# Patient Record
Sex: MALE | Race: BLACK OR AFRICAN AMERICAN | Employment: OTHER
[De-identification: names, ages, dates, MRNs, and addresses within clinical notes are randomized per-mention and may not be internally consistent; named-entity substitution may affect disease eponyms.]

---

## 2017-01-11 ENCOUNTER — OFFICE VISIT (OUTPATIENT)
Dept: SURGERY | Facility: CLINIC | Age: 57
End: 2017-01-11

## 2017-01-11 VITALS
HEIGHT: 72 IN | HEART RATE: 91 BPM | TEMPERATURE: 95.5 F | WEIGHT: 204 LBS | DIASTOLIC BLOOD PRESSURE: 75 MMHG | SYSTOLIC BLOOD PRESSURE: 115 MMHG | BODY MASS INDEX: 27.63 KG/M2

## 2017-01-11 DIAGNOSIS — Z98.890 HISTORY OF COLONOSCOPY WITH POLYPECTOMY: Primary | ICD-10-CM

## 2017-01-11 DIAGNOSIS — Z86.010 HISTORY OF COLONOSCOPY WITH POLYPECTOMY: Primary | ICD-10-CM

## 2017-01-11 PROCEDURE — 99202 OFFICE O/P NEW SF 15 MIN: CPT | Performed by: SURGERY

## 2017-01-17 ENCOUNTER — OFFICE VISIT (OUTPATIENT)
Dept: ONCOLOGY | Facility: CLINIC | Age: 57
End: 2017-01-17

## 2017-01-17 ENCOUNTER — TELEPHONE (OUTPATIENT)
Dept: ONCOLOGY | Facility: CLINIC | Age: 57
End: 2017-01-17

## 2017-01-17 VITALS
SYSTOLIC BLOOD PRESSURE: 131 MMHG | DIASTOLIC BLOOD PRESSURE: 86 MMHG | TEMPERATURE: 97.4 F | WEIGHT: 208.2 LBS | BODY MASS INDEX: 28.2 KG/M2 | HEART RATE: 65 BPM | RESPIRATION RATE: 20 BRPM

## 2017-01-17 DIAGNOSIS — C34.91 MALIGNANT NEOPLASM OF RIGHT LUNG, UNSPECIFIED PART OF LUNG (HCC): Primary | ICD-10-CM

## 2017-01-17 PROCEDURE — 99214 OFFICE O/P EST MOD 30 MIN: CPT | Performed by: INTERNAL MEDICINE

## 2017-01-30 RX ORDER — MELATONIN
Qty: 30 TABLET | Refills: 0 | Status: SHIPPED | OUTPATIENT
Start: 2017-01-30 | End: 2017-03-02 | Stop reason: SDUPTHER

## 2017-02-21 ENCOUNTER — HOSPITAL ENCOUNTER (OUTPATIENT)
Dept: PHYSICAL THERAPY | Age: 57
Setting detail: THERAPIES SERIES
Discharge: HOME | End: 2017-02-21
Payer: COMMERCIAL

## 2017-02-21 PROCEDURE — 97110 THERAPEUTIC EXERCISES: CPT

## 2017-02-23 ENCOUNTER — HOSPITAL ENCOUNTER (OUTPATIENT)
Dept: PHYSICAL THERAPY | Age: 57
Setting detail: THERAPIES SERIES
End: 2017-02-23
Payer: COMMERCIAL

## 2017-03-01 ENCOUNTER — HOSPITAL ENCOUNTER (OUTPATIENT)
Dept: PHYSICAL THERAPY | Age: 57
Setting detail: THERAPIES SERIES
Discharge: HOME | End: 2017-03-01
Payer: COMMERCIAL

## 2017-03-01 RX ORDER — GABAPENTIN 300 MG/1
CAPSULE ORAL
Qty: 90 CAPSULE | Refills: 0 | Status: SHIPPED | OUTPATIENT
Start: 2017-03-01 | End: 2017-03-30 | Stop reason: SDUPTHER

## 2017-03-02 RX ORDER — MELATONIN
Qty: 30 TABLET | Refills: 0 | Status: SHIPPED | OUTPATIENT
Start: 2017-03-02 | End: 2017-05-19 | Stop reason: SDUPTHER

## 2017-03-03 ENCOUNTER — HOSPITAL ENCOUNTER (OUTPATIENT)
Dept: PHYSICAL THERAPY | Age: 57
Setting detail: THERAPIES SERIES
Discharge: HOME | End: 2017-03-03
Payer: COMMERCIAL

## 2017-03-03 PROCEDURE — G8980 MOBILITY D/C STATUS: HCPCS

## 2017-03-03 PROCEDURE — G8979 MOBILITY GOAL STATUS: HCPCS

## 2017-03-03 PROCEDURE — 97110 THERAPEUTIC EXERCISES: CPT

## 2017-03-30 RX ORDER — GABAPENTIN 300 MG/1
CAPSULE ORAL
Qty: 90 CAPSULE | Refills: 0 | Status: SHIPPED | OUTPATIENT
Start: 2017-03-30 | End: 2017-05-03 | Stop reason: SDUPTHER

## 2017-03-30 RX ORDER — MELATONIN
Qty: 30 TABLET | Refills: 0 | Status: SHIPPED | OUTPATIENT
Start: 2017-03-30 | End: 2017-05-19 | Stop reason: SDUPTHER

## 2017-05-03 RX ORDER — GABAPENTIN 300 MG/1
CAPSULE ORAL
Qty: 90 CAPSULE | Refills: 0 | Status: SHIPPED | OUTPATIENT
Start: 2017-05-03 | End: 2018-01-17

## 2017-05-04 ENCOUNTER — HOSPITAL ENCOUNTER (OUTPATIENT)
Dept: PAIN MANAGEMENT | Age: 57
Discharge: HOME | End: 2017-05-04
Payer: COMMERCIAL

## 2017-05-04 VITALS
DIASTOLIC BLOOD PRESSURE: 83 MMHG | SYSTOLIC BLOOD PRESSURE: 134 MMHG | HEART RATE: 84 BPM | TEMPERATURE: 96.9 F | RESPIRATION RATE: 16 BRPM | WEIGHT: 217 LBS | BODY MASS INDEX: 29.39 KG/M2 | HEIGHT: 72 IN

## 2017-05-04 DIAGNOSIS — M53.80 OTHER SYMPTOMS REFERABLE TO BACK: Chronic | ICD-10-CM

## 2017-05-04 DIAGNOSIS — M54.41 CHRONIC RIGHT-SIDED LOW BACK PAIN WITH BILATERAL SCIATICA: ICD-10-CM

## 2017-05-04 DIAGNOSIS — G89.29 CHRONIC RIGHT-SIDED LOW BACK PAIN WITH BILATERAL SCIATICA: ICD-10-CM

## 2017-05-04 DIAGNOSIS — M54.42 CHRONIC RIGHT-SIDED LOW BACK PAIN WITH BILATERAL SCIATICA: ICD-10-CM

## 2017-05-04 DIAGNOSIS — M51.26 DISPLACEMENT OF LUMBAR INTERVERTEBRAL DISC WITHOUT MYELOPATHY: Primary | Chronic | ICD-10-CM

## 2017-05-04 PROCEDURE — 99203 OFFICE O/P NEW LOW 30 MIN: CPT

## 2017-05-04 RX ORDER — DULOXETIN HYDROCHLORIDE 20 MG/1
20 CAPSULE, DELAYED RELEASE ORAL DAILY
COMMUNITY
End: 2017-09-21 | Stop reason: SDUPTHER

## 2017-05-04 RX ORDER — NAPROXEN 375 MG/1
375 TABLET ORAL 2 TIMES DAILY WITH MEALS
COMMUNITY

## 2017-05-04 RX ORDER — CYCLOBENZAPRINE HCL 10 MG
10 TABLET ORAL 3 TIMES DAILY PRN
COMMUNITY
End: 2017-05-19 | Stop reason: SDUPTHER

## 2017-05-04 ASSESSMENT — PAIN DESCRIPTION - ORIENTATION: ORIENTATION: LOWER;POSTERIOR;LEFT;RIGHT

## 2017-05-04 ASSESSMENT — PAIN DESCRIPTION - DESCRIPTORS: DESCRIPTORS: BURNING;ACHING;SHARP

## 2017-05-04 ASSESSMENT — ENCOUNTER SYMPTOMS
UNUSUAL HAIR DISTRIBUTION: 0
BLURRED VISION: 0
SUSPICIOUS LESIONS: 0
BACK PAIN: 1
EYE DISCHARGE: 0

## 2017-05-04 ASSESSMENT — PAIN DESCRIPTION - ONSET: ONSET: ON-GOING

## 2017-05-04 ASSESSMENT — PAIN DESCRIPTION - PAIN TYPE: TYPE: CHRONIC PAIN

## 2017-05-04 ASSESSMENT — PAIN DESCRIPTION - PROGRESSION: CLINICAL_PROGRESSION: GRADUALLY WORSENING

## 2017-05-04 ASSESSMENT — PAIN DESCRIPTION - LOCATION: LOCATION: BACK;LEG;NECK

## 2017-05-04 ASSESSMENT — PAIN SCALES - GENERAL: PAINLEVEL_OUTOF10: 10

## 2017-05-13 ENCOUNTER — HOSPITAL ENCOUNTER (OUTPATIENT)
Dept: MRI IMAGING | Age: 57
Discharge: HOME | End: 2017-05-13
Payer: COMMERCIAL

## 2017-05-13 DIAGNOSIS — M51.26 DISPLACEMENT OF LUMBAR INTERVERTEBRAL DISC WITHOUT MYELOPATHY: ICD-10-CM

## 2017-05-13 DIAGNOSIS — M54.42 CHRONIC RIGHT-SIDED LOW BACK PAIN WITH BILATERAL SCIATICA: ICD-10-CM

## 2017-05-13 DIAGNOSIS — M54.41 CHRONIC RIGHT-SIDED LOW BACK PAIN WITH BILATERAL SCIATICA: ICD-10-CM

## 2017-05-13 DIAGNOSIS — G89.29 CHRONIC RIGHT-SIDED LOW BACK PAIN WITH BILATERAL SCIATICA: ICD-10-CM

## 2017-05-13 PROCEDURE — 72148 MRI LUMBAR SPINE W/O DYE: CPT

## 2017-05-17 ENCOUNTER — HOSPITAL ENCOUNTER (OUTPATIENT)
Dept: PAIN MANAGEMENT | Age: 57
Discharge: HOME | End: 2017-05-17
Payer: COMMERCIAL

## 2017-05-17 VITALS
SYSTOLIC BLOOD PRESSURE: 112 MMHG | DIASTOLIC BLOOD PRESSURE: 76 MMHG | WEIGHT: 217 LBS | HEIGHT: 72 IN | BODY MASS INDEX: 29.39 KG/M2 | TEMPERATURE: 98 F | RESPIRATION RATE: 18 BRPM | HEART RATE: 79 BPM

## 2017-05-17 PROCEDURE — 97032 APPL MODALITY 1+ESTIM EA 15: CPT

## 2017-05-17 ASSESSMENT — PAIN DESCRIPTION - ORIENTATION: ORIENTATION: RIGHT;LEFT;LOWER;POSTERIOR

## 2017-05-17 ASSESSMENT — PAIN DESCRIPTION - LOCATION: LOCATION: BACK;LEG;NECK

## 2017-05-17 ASSESSMENT — PAIN DESCRIPTION - ONSET: ONSET: ON-GOING

## 2017-05-17 ASSESSMENT — PAIN SCALES - GENERAL: PAINLEVEL_OUTOF10: 10

## 2017-05-17 ASSESSMENT — PAIN DESCRIPTION - PROGRESSION: CLINICAL_PROGRESSION: GRADUALLY WORSENING

## 2017-05-17 ASSESSMENT — PAIN DESCRIPTION - DESCRIPTORS: DESCRIPTORS: CONSTANT;BURNING;ACHING;SHARP

## 2017-05-17 ASSESSMENT — PAIN DESCRIPTION - FREQUENCY: FREQUENCY: CONTINUOUS

## 2017-05-19 ENCOUNTER — OFFICE VISIT (OUTPATIENT)
Dept: FAMILY MEDICINE CLINIC | Age: 57
End: 2017-05-19
Payer: COMMERCIAL

## 2017-05-19 ENCOUNTER — HOSPITAL ENCOUNTER (OUTPATIENT)
Dept: PAIN MANAGEMENT | Age: 57
Discharge: HOME | End: 2017-05-19
Payer: COMMERCIAL

## 2017-05-19 VITALS
RESPIRATION RATE: 16 BRPM | TEMPERATURE: 98.4 F | SYSTOLIC BLOOD PRESSURE: 156 MMHG | DIASTOLIC BLOOD PRESSURE: 80 MMHG | HEART RATE: 75 BPM

## 2017-05-19 VITALS
DIASTOLIC BLOOD PRESSURE: 73 MMHG | HEIGHT: 72 IN | WEIGHT: 214.4 LBS | SYSTOLIC BLOOD PRESSURE: 112 MMHG | TEMPERATURE: 97.2 F | HEART RATE: 83 BPM | BODY MASS INDEX: 29.04 KG/M2

## 2017-05-19 DIAGNOSIS — Z23 NEED FOR PNEUMOCOCCAL VACCINATION: ICD-10-CM

## 2017-05-19 DIAGNOSIS — M54.41 CHRONIC RIGHT-SIDED LOW BACK PAIN WITH BILATERAL SCIATICA: Primary | ICD-10-CM

## 2017-05-19 DIAGNOSIS — E55.9 VITAMIN D DEFICIENCY: ICD-10-CM

## 2017-05-19 DIAGNOSIS — G89.29 CHRONIC RIGHT-SIDED LOW BACK PAIN WITH BILATERAL SCIATICA: Primary | ICD-10-CM

## 2017-05-19 DIAGNOSIS — M54.42 CHRONIC RIGHT-SIDED LOW BACK PAIN WITH BILATERAL SCIATICA: Primary | ICD-10-CM

## 2017-05-19 DIAGNOSIS — F32.A DEPRESSION, UNSPECIFIED DEPRESSION TYPE: ICD-10-CM

## 2017-05-19 PROBLEM — R73.03 PREDIABETES: Status: ACTIVE | Noted: 2017-05-19

## 2017-05-19 PROCEDURE — 97032 APPL MODALITY 1+ESTIM EA 15: CPT

## 2017-05-19 PROCEDURE — 90732 PPSV23 VACC 2 YRS+ SUBQ/IM: CPT | Performed by: FAMILY MEDICINE

## 2017-05-19 PROCEDURE — 99213 OFFICE O/P EST LOW 20 MIN: CPT | Performed by: FAMILY MEDICINE

## 2017-05-19 PROCEDURE — G0009 ADMIN PNEUMOCOCCAL VACCINE: HCPCS | Performed by: FAMILY MEDICINE

## 2017-05-19 RX ORDER — BUPROPION HYDROCHLORIDE 150 MG/1
150 TABLET ORAL EVERY MORNING
Qty: 30 TABLET | Refills: 3 | Status: SHIPPED | OUTPATIENT
Start: 2017-05-19 | End: 2017-09-21 | Stop reason: SDUPTHER

## 2017-05-19 RX ORDER — MELATONIN
Qty: 30 TABLET | Refills: 0 | Status: SHIPPED | OUTPATIENT
Start: 2017-05-19 | End: 2017-06-19 | Stop reason: SDUPTHER

## 2017-05-19 RX ORDER — CYCLOBENZAPRINE HCL 10 MG
10 TABLET ORAL 2 TIMES DAILY PRN
Qty: 30 TABLET | Refills: 0 | Status: SHIPPED | OUTPATIENT
Start: 2017-05-19 | End: 2017-09-21 | Stop reason: SDUPTHER

## 2017-05-19 ASSESSMENT — ENCOUNTER SYMPTOMS
COUGH: 0
SHORTNESS OF BREATH: 0
WHEEZING: 0
BACK PAIN: 1

## 2017-05-19 ASSESSMENT — PAIN DESCRIPTION - ONSET: ONSET: ON-GOING

## 2017-05-19 ASSESSMENT — PAIN SCALES - GENERAL: PAINLEVEL_OUTOF10: 10

## 2017-05-19 ASSESSMENT — PAIN DESCRIPTION - ORIENTATION: ORIENTATION: LOWER

## 2017-05-19 ASSESSMENT — PAIN DESCRIPTION - FREQUENCY: FREQUENCY: CONTINUOUS

## 2017-05-19 ASSESSMENT — PAIN DESCRIPTION - LOCATION: LOCATION: BACK

## 2017-05-19 ASSESSMENT — PAIN DESCRIPTION - DESCRIPTORS: DESCRIPTORS: ACHING;BURNING;CONSTANT;SHARP

## 2017-05-19 ASSESSMENT — PAIN DESCRIPTION - PAIN TYPE: TYPE: CHRONIC PAIN

## 2017-05-19 ASSESSMENT — PAIN DESCRIPTION - PROGRESSION: CLINICAL_PROGRESSION: GRADUALLY WORSENING

## 2017-05-22 ENCOUNTER — HOSPITAL ENCOUNTER (EMERGENCY)
Age: 57
Discharge: HOME | End: 2017-05-22
Attending: EMERGENCY MEDICINE
Payer: COMMERCIAL

## 2017-05-22 ENCOUNTER — APPOINTMENT (OUTPATIENT)
Dept: GENERAL RADIOLOGY | Age: 57
End: 2017-05-22
Payer: COMMERCIAL

## 2017-05-22 VITALS
SYSTOLIC BLOOD PRESSURE: 140 MMHG | BODY MASS INDEX: 29.39 KG/M2 | WEIGHT: 217 LBS | OXYGEN SATURATION: 96 % | DIASTOLIC BLOOD PRESSURE: 79 MMHG | TEMPERATURE: 98.4 F | HEART RATE: 79 BPM | RESPIRATION RATE: 16 BRPM

## 2017-05-22 DIAGNOSIS — M25.511 ACUTE PAIN OF RIGHT SHOULDER: Primary | ICD-10-CM

## 2017-05-22 PROCEDURE — 6370000000 HC RX 637 (ALT 250 FOR IP): Performed by: EMERGENCY MEDICINE

## 2017-05-22 PROCEDURE — 73030 X-RAY EXAM OF SHOULDER: CPT

## 2017-05-22 PROCEDURE — 72040 X-RAY EXAM NECK SPINE 2-3 VW: CPT

## 2017-05-22 PROCEDURE — G0382 LEV 3 HOSP TYPE B ED VISIT: HCPCS

## 2017-05-22 RX ORDER — IBUPROFEN 400 MG/1
400 TABLET ORAL ONCE
Status: COMPLETED | OUTPATIENT
Start: 2017-05-22 | End: 2017-05-22

## 2017-05-22 RX ORDER — CYCLOBENZAPRINE HCL 10 MG
5 TABLET ORAL ONCE
Status: COMPLETED | OUTPATIENT
Start: 2017-05-22 | End: 2017-05-22

## 2017-05-22 RX ORDER — CYCLOBENZAPRINE HCL 5 MG
5 TABLET ORAL 3 TIMES DAILY PRN
Qty: 15 TABLET | Refills: 0 | Status: SHIPPED | OUTPATIENT
Start: 2017-05-22 | End: 2017-06-01

## 2017-05-22 RX ADMIN — CYCLOBENZAPRINE HYDROCHLORIDE 5 MG: 10 TABLET, FILM COATED ORAL at 11:48

## 2017-05-22 RX ADMIN — IBUPROFEN 400 MG: 400 TABLET ORAL at 11:39

## 2017-05-22 ASSESSMENT — PAIN DESCRIPTION - LOCATION: LOCATION: SHOULDER

## 2017-05-22 ASSESSMENT — PAIN DESCRIPTION - PAIN TYPE: TYPE: ACUTE PAIN

## 2017-05-22 ASSESSMENT — PAIN DESCRIPTION - ONSET: ONSET: ON-GOING

## 2017-05-22 ASSESSMENT — ENCOUNTER SYMPTOMS
CONSTIPATION: 0
COUGH: 0
ABDOMINAL PAIN: 0
VOMITING: 0
PHOTOPHOBIA: 0
CHEST TIGHTNESS: 0
DIARRHEA: 0
NAUSEA: 0
SHORTNESS OF BREATH: 0

## 2017-05-22 ASSESSMENT — PAIN DESCRIPTION - ORIENTATION: ORIENTATION: RIGHT

## 2017-05-22 ASSESSMENT — PAIN DESCRIPTION - DESCRIPTORS: DESCRIPTORS: ACHING

## 2017-05-22 ASSESSMENT — PAIN SCALES - GENERAL
PAINLEVEL_OUTOF10: 10
PAINLEVEL_OUTOF10: 10

## 2017-05-22 ASSESSMENT — PAIN DESCRIPTION - PROGRESSION: CLINICAL_PROGRESSION: GRADUALLY WORSENING

## 2017-05-23 ENCOUNTER — TELEPHONE (OUTPATIENT)
Dept: FAMILY MEDICINE CLINIC | Age: 57
End: 2017-05-23

## 2017-05-24 ENCOUNTER — HOSPITAL ENCOUNTER (OUTPATIENT)
Dept: PAIN MANAGEMENT | Age: 57
Discharge: HOME | End: 2017-05-24
Payer: COMMERCIAL

## 2017-05-24 VITALS
RESPIRATION RATE: 12 BRPM | TEMPERATURE: 98.6 F | DIASTOLIC BLOOD PRESSURE: 61 MMHG | SYSTOLIC BLOOD PRESSURE: 145 MMHG | HEART RATE: 85 BPM

## 2017-05-24 PROCEDURE — 97032 APPL MODALITY 1+ESTIM EA 15: CPT

## 2017-05-24 ASSESSMENT — PAIN DESCRIPTION - DESCRIPTORS: DESCRIPTORS: ACHING;DULL

## 2017-05-24 ASSESSMENT — PAIN SCALES - GENERAL: PAINLEVEL_OUTOF10: 8

## 2017-05-24 ASSESSMENT — PAIN DESCRIPTION - FREQUENCY: FREQUENCY: INTERMITTENT

## 2017-05-24 ASSESSMENT — PAIN DESCRIPTION - LOCATION: LOCATION: BACK

## 2017-05-24 ASSESSMENT — PAIN DESCRIPTION - ORIENTATION: ORIENTATION: LOWER

## 2017-05-26 ENCOUNTER — HOSPITAL ENCOUNTER (OUTPATIENT)
Dept: PAIN MANAGEMENT | Age: 57
Discharge: HOME | End: 2017-05-26
Payer: COMMERCIAL

## 2017-05-26 VITALS
RESPIRATION RATE: 16 BRPM | HEART RATE: 80 BPM | TEMPERATURE: 97 F | BODY MASS INDEX: 25.62 KG/M2 | HEIGHT: 77 IN | SYSTOLIC BLOOD PRESSURE: 148 MMHG | DIASTOLIC BLOOD PRESSURE: 83 MMHG | WEIGHT: 217 LBS

## 2017-05-26 PROCEDURE — 97032 APPL MODALITY 1+ESTIM EA 15: CPT

## 2017-05-26 ASSESSMENT — PAIN DESCRIPTION - DESCRIPTORS: DESCRIPTORS: ACHING;DULL

## 2017-05-26 ASSESSMENT — PAIN DESCRIPTION - PAIN TYPE: TYPE: CHRONIC PAIN

## 2017-05-26 ASSESSMENT — PAIN DESCRIPTION - FREQUENCY: FREQUENCY: INTERMITTENT

## 2017-05-26 ASSESSMENT — ACTIVITIES OF DAILY LIVING (ADL): EFFECT OF PAIN ON DAILY ACTIVITIES: NOTHING

## 2017-05-26 ASSESSMENT — PAIN DESCRIPTION - DIRECTION: RADIATING_TOWARDS: NONRADIATING

## 2017-05-26 ASSESSMENT — PAIN SCALES - GENERAL: PAINLEVEL_OUTOF10: 6

## 2017-05-26 ASSESSMENT — PAIN DESCRIPTION - ORIENTATION: ORIENTATION: LOWER

## 2017-05-26 ASSESSMENT — PAIN DESCRIPTION - PROGRESSION: CLINICAL_PROGRESSION: GRADUALLY WORSENING

## 2017-05-26 ASSESSMENT — PAIN DESCRIPTION - ONSET: ONSET: ON-GOING

## 2017-05-26 ASSESSMENT — PAIN DESCRIPTION - LOCATION: LOCATION: BACK

## 2017-05-31 ENCOUNTER — HOSPITAL ENCOUNTER (OUTPATIENT)
Dept: PAIN MANAGEMENT | Age: 57
Discharge: HOME | End: 2017-05-31
Payer: COMMERCIAL

## 2017-05-31 VITALS
WEIGHT: 217 LBS | TEMPERATURE: 98 F | SYSTOLIC BLOOD PRESSURE: 135 MMHG | HEART RATE: 79 BPM | HEIGHT: 77 IN | DIASTOLIC BLOOD PRESSURE: 77 MMHG | RESPIRATION RATE: 20 BRPM | BODY MASS INDEX: 25.62 KG/M2

## 2017-05-31 DIAGNOSIS — M25.511 ACUTE PAIN OF RIGHT SHOULDER: Primary | ICD-10-CM

## 2017-05-31 PROBLEM — M25.519 SHOULDER PAIN: Status: ACTIVE | Noted: 2017-05-31

## 2017-05-31 PROCEDURE — 97032 APPL MODALITY 1+ESTIM EA 15: CPT

## 2017-06-02 ENCOUNTER — HOSPITAL ENCOUNTER (OUTPATIENT)
Dept: PAIN MANAGEMENT | Age: 57
Discharge: HOME | End: 2017-06-02
Payer: COMMERCIAL

## 2017-06-02 VITALS — HEART RATE: 70 BPM | TEMPERATURE: 98.2 F | DIASTOLIC BLOOD PRESSURE: 78 MMHG | SYSTOLIC BLOOD PRESSURE: 130 MMHG

## 2017-06-02 PROCEDURE — 97032 APPL MODALITY 1+ESTIM EA 15: CPT

## 2017-06-02 ASSESSMENT — PAIN DESCRIPTION - PAIN TYPE: TYPE: CHRONIC PAIN

## 2017-06-02 ASSESSMENT — PAIN DESCRIPTION - DESCRIPTORS: DESCRIPTORS: ACHING;DULL

## 2017-06-02 ASSESSMENT — PAIN DESCRIPTION - PROGRESSION: CLINICAL_PROGRESSION: GRADUALLY WORSENING

## 2017-06-02 ASSESSMENT — PAIN DESCRIPTION - FREQUENCY: FREQUENCY: INTERMITTENT

## 2017-06-02 ASSESSMENT — PAIN DESCRIPTION - ORIENTATION: ORIENTATION: LOWER

## 2017-06-02 ASSESSMENT — PAIN DESCRIPTION - ONSET: ONSET: ON-GOING

## 2017-06-02 ASSESSMENT — PAIN DESCRIPTION - LOCATION: LOCATION: BACK;SHOULDER

## 2017-06-02 ASSESSMENT — PAIN SCALES - GENERAL: PAINLEVEL_OUTOF10: 8

## 2017-06-07 ENCOUNTER — HOSPITAL ENCOUNTER (OUTPATIENT)
Dept: PAIN MANAGEMENT | Age: 57
Discharge: HOME | End: 2017-06-07
Payer: COMMERCIAL

## 2017-06-07 VITALS
SYSTOLIC BLOOD PRESSURE: 155 MMHG | DIASTOLIC BLOOD PRESSURE: 82 MMHG | HEART RATE: 77 BPM | RESPIRATION RATE: 18 BRPM | TEMPERATURE: 98.2 F

## 2017-06-07 PROCEDURE — 97032 APPL MODALITY 1+ESTIM EA 15: CPT

## 2017-06-07 ASSESSMENT — PAIN DESCRIPTION - ORIENTATION: ORIENTATION: LOWER

## 2017-06-07 ASSESSMENT — PAIN DESCRIPTION - PROGRESSION: CLINICAL_PROGRESSION: GRADUALLY IMPROVING

## 2017-06-07 ASSESSMENT — PAIN DESCRIPTION - FREQUENCY: FREQUENCY: INTERMITTENT

## 2017-06-07 ASSESSMENT — PAIN DESCRIPTION - DESCRIPTORS: DESCRIPTORS: ACHING;DULL

## 2017-06-07 ASSESSMENT — PAIN DESCRIPTION - LOCATION: LOCATION: BACK

## 2017-06-07 ASSESSMENT — PAIN DESCRIPTION - PAIN TYPE: TYPE: CHRONIC PAIN

## 2017-06-07 ASSESSMENT — PAIN SCALES - GENERAL: PAINLEVEL_OUTOF10: 5

## 2017-06-09 ENCOUNTER — HOSPITAL ENCOUNTER (OUTPATIENT)
Dept: PAIN MANAGEMENT | Age: 57
Discharge: HOME | End: 2017-06-09
Payer: COMMERCIAL

## 2017-06-09 VITALS
DIASTOLIC BLOOD PRESSURE: 76 MMHG | HEART RATE: 74 BPM | BODY MASS INDEX: 25.62 KG/M2 | RESPIRATION RATE: 18 BRPM | WEIGHT: 217 LBS | HEIGHT: 77 IN | TEMPERATURE: 98.1 F | SYSTOLIC BLOOD PRESSURE: 140 MMHG

## 2017-06-09 PROCEDURE — 97032 APPL MODALITY 1+ESTIM EA 15: CPT

## 2017-06-09 ASSESSMENT — PAIN DESCRIPTION - PROGRESSION: CLINICAL_PROGRESSION: GRADUALLY IMPROVING

## 2017-06-09 ASSESSMENT — PAIN DESCRIPTION - ORIENTATION: ORIENTATION: LOWER

## 2017-06-09 ASSESSMENT — PAIN DESCRIPTION - FREQUENCY: FREQUENCY: INTERMITTENT

## 2017-06-09 ASSESSMENT — PAIN DESCRIPTION - ONSET: ONSET: ON-GOING

## 2017-06-09 ASSESSMENT — PAIN SCALES - GENERAL: PAINLEVEL_OUTOF10: 9

## 2017-06-09 ASSESSMENT — PAIN DESCRIPTION - DESCRIPTORS: DESCRIPTORS: ACHING;DULL

## 2017-06-09 ASSESSMENT — PAIN DESCRIPTION - LOCATION: LOCATION: BACK

## 2017-06-09 ASSESSMENT — PAIN DESCRIPTION - PAIN TYPE: TYPE: CHRONIC PAIN

## 2017-06-14 ENCOUNTER — HOSPITAL ENCOUNTER (OUTPATIENT)
Dept: PAIN MANAGEMENT | Age: 57
Discharge: HOME | End: 2017-06-14
Payer: COMMERCIAL

## 2017-06-14 VITALS
SYSTOLIC BLOOD PRESSURE: 128 MMHG | RESPIRATION RATE: 16 BRPM | WEIGHT: 217 LBS | HEIGHT: 65 IN | DIASTOLIC BLOOD PRESSURE: 76 MMHG | HEART RATE: 85 BPM | BODY MASS INDEX: 36.15 KG/M2 | TEMPERATURE: 97.8 F

## 2017-06-14 PROCEDURE — 97032 APPL MODALITY 1+ESTIM EA 15: CPT

## 2017-06-14 ASSESSMENT — PAIN DESCRIPTION - FREQUENCY: FREQUENCY: INTERMITTENT

## 2017-06-14 ASSESSMENT — PAIN DESCRIPTION - PROGRESSION: CLINICAL_PROGRESSION: GRADUALLY IMPROVING

## 2017-06-14 ASSESSMENT — PAIN SCALES - GENERAL: PAINLEVEL_OUTOF10: 7

## 2017-06-14 ASSESSMENT — PAIN DESCRIPTION - ORIENTATION: ORIENTATION: LOWER

## 2017-06-14 ASSESSMENT — PAIN DESCRIPTION - PAIN TYPE: TYPE: CHRONIC PAIN

## 2017-06-14 ASSESSMENT — PAIN DESCRIPTION - ONSET: ONSET: ON-GOING

## 2017-06-14 ASSESSMENT — PAIN DESCRIPTION - LOCATION: LOCATION: BACK

## 2017-06-14 ASSESSMENT — PAIN DESCRIPTION - DESCRIPTORS: DESCRIPTORS: ACHING;DULL

## 2017-06-16 ENCOUNTER — HOSPITAL ENCOUNTER (OUTPATIENT)
Dept: PAIN MANAGEMENT | Age: 57
Discharge: HOME | End: 2017-06-16
Payer: COMMERCIAL

## 2017-06-16 VITALS
RESPIRATION RATE: 18 BRPM | TEMPERATURE: 98 F | HEIGHT: 65 IN | DIASTOLIC BLOOD PRESSURE: 78 MMHG | BODY MASS INDEX: 36.15 KG/M2 | HEART RATE: 88 BPM | WEIGHT: 217 LBS | SYSTOLIC BLOOD PRESSURE: 133 MMHG

## 2017-06-16 PROCEDURE — 97032 APPL MODALITY 1+ESTIM EA 15: CPT

## 2017-06-19 DIAGNOSIS — E55.9 VITAMIN D DEFICIENCY: ICD-10-CM

## 2017-06-20 RX ORDER — MELATONIN
Qty: 30 TABLET | Refills: 0 | Status: SHIPPED | OUTPATIENT
Start: 2017-06-20 | End: 2017-07-06 | Stop reason: SDUPTHER

## 2017-06-28 ENCOUNTER — HOSPITAL ENCOUNTER (OUTPATIENT)
Dept: PAIN MANAGEMENT | Age: 57
Discharge: HOME | End: 2017-06-28
Payer: COMMERCIAL

## 2017-06-28 VITALS
SYSTOLIC BLOOD PRESSURE: 138 MMHG | HEART RATE: 81 BPM | TEMPERATURE: 97.8 F | DIASTOLIC BLOOD PRESSURE: 76 MMHG | RESPIRATION RATE: 17 BRPM | BODY MASS INDEX: 36.11 KG/M2 | WEIGHT: 217 LBS

## 2017-06-28 PROCEDURE — 99211 OFF/OP EST MAY X REQ PHY/QHP: CPT

## 2017-06-28 PROCEDURE — G0463 HOSPITAL OUTPT CLINIC VISIT: HCPCS

## 2017-06-28 ASSESSMENT — PAIN DESCRIPTION - LOCATION: LOCATION: BACK;LEG;SHOULDER

## 2017-06-28 ASSESSMENT — PAIN SCALES - GENERAL: PAINLEVEL_OUTOF10: 4

## 2017-06-28 ASSESSMENT — PAIN DESCRIPTION - ORIENTATION: ORIENTATION: RIGHT

## 2017-06-28 ASSESSMENT — PAIN DESCRIPTION - PAIN TYPE: TYPE: CHRONIC PAIN

## 2017-06-28 ASSESSMENT — PAIN DESCRIPTION - DESCRIPTORS: DESCRIPTORS: CONSTANT;STABBING

## 2017-06-28 ASSESSMENT — PAIN DESCRIPTION - PROGRESSION: CLINICAL_PROGRESSION: NOT CHANGED

## 2017-07-06 DIAGNOSIS — E55.9 VITAMIN D DEFICIENCY: ICD-10-CM

## 2017-07-07 RX ORDER — MELATONIN
Qty: 30 TABLET | Refills: 0 | Status: SHIPPED | OUTPATIENT
Start: 2017-07-07 | End: 2017-08-12 | Stop reason: SDUPTHER

## 2017-07-07 RX ORDER — ATORVASTATIN CALCIUM 20 MG/1
TABLET, FILM COATED ORAL
Qty: 30 TABLET | Refills: 0 | Status: SHIPPED | OUTPATIENT
Start: 2017-07-07 | End: 2017-07-11 | Stop reason: SDUPTHER

## 2017-07-11 ENCOUNTER — OFFICE VISIT (OUTPATIENT)
Dept: FAMILY MEDICINE CLINIC | Age: 57
End: 2017-07-11
Payer: COMMERCIAL

## 2017-07-11 VITALS
DIASTOLIC BLOOD PRESSURE: 82 MMHG | SYSTOLIC BLOOD PRESSURE: 129 MMHG | WEIGHT: 218.8 LBS | HEART RATE: 71 BPM | HEIGHT: 72 IN | TEMPERATURE: 96.6 F | BODY MASS INDEX: 29.64 KG/M2

## 2017-07-11 DIAGNOSIS — E55.9 VITAMIN D DEFICIENCY: ICD-10-CM

## 2017-07-11 DIAGNOSIS — M54.41 CHRONIC RIGHT-SIDED LOW BACK PAIN WITH BILATERAL SCIATICA: ICD-10-CM

## 2017-07-11 DIAGNOSIS — R73.03 PREDIABETES: Primary | ICD-10-CM

## 2017-07-11 DIAGNOSIS — G89.29 CHRONIC RIGHT-SIDED LOW BACK PAIN WITH BILATERAL SCIATICA: ICD-10-CM

## 2017-07-11 DIAGNOSIS — G03.9 ARACHNOIDITIS: ICD-10-CM

## 2017-07-11 DIAGNOSIS — M54.42 CHRONIC RIGHT-SIDED LOW BACK PAIN WITH BILATERAL SCIATICA: ICD-10-CM

## 2017-07-11 LAB — HBA1C MFR BLD: 6.1 %

## 2017-07-11 PROCEDURE — 83036 HEMOGLOBIN GLYCOSYLATED A1C: CPT | Performed by: FAMILY MEDICINE

## 2017-07-11 PROCEDURE — 99213 OFFICE O/P EST LOW 20 MIN: CPT | Performed by: FAMILY MEDICINE

## 2017-07-11 RX ORDER — ATORVASTATIN CALCIUM 20 MG/1
TABLET, FILM COATED ORAL
Qty: 30 TABLET | Refills: 3 | Status: SHIPPED | OUTPATIENT
Start: 2017-07-11 | End: 2017-09-14 | Stop reason: SDUPTHER

## 2017-07-11 RX ORDER — ATORVASTATIN CALCIUM 20 MG/1
TABLET, FILM COATED ORAL
Qty: 30 TABLET | Refills: 0 | Status: SHIPPED | OUTPATIENT
Start: 2017-07-11 | End: 2017-07-11 | Stop reason: SDUPTHER

## 2017-07-11 RX ORDER — MELATONIN
Qty: 30 TABLET | Refills: 0 | Status: CANCELLED | OUTPATIENT
Start: 2017-07-11

## 2017-07-11 ASSESSMENT — ENCOUNTER SYMPTOMS
BACK PAIN: 1
RESPIRATORY NEGATIVE: 1
GASTROINTESTINAL NEGATIVE: 1
EYES NEGATIVE: 1

## 2017-08-12 DIAGNOSIS — E55.9 VITAMIN D DEFICIENCY: ICD-10-CM

## 2017-08-14 ENCOUNTER — HOSPITAL ENCOUNTER (OUTPATIENT)
Dept: PAIN MANAGEMENT | Age: 57
Discharge: HOME | End: 2017-08-14
Payer: COMMERCIAL

## 2017-08-14 VITALS
TEMPERATURE: 98.1 F | SYSTOLIC BLOOD PRESSURE: 139 MMHG | HEART RATE: 80 BPM | DIASTOLIC BLOOD PRESSURE: 61 MMHG | RESPIRATION RATE: 16 BRPM

## 2017-08-14 DIAGNOSIS — M51.26 DISPLACEMENT OF LUMBAR INTERVERTEBRAL DISC WITHOUT MYELOPATHY: Primary | Chronic | ICD-10-CM

## 2017-08-14 PROCEDURE — G0463 HOSPITAL OUTPT CLINIC VISIT: HCPCS

## 2017-08-14 PROCEDURE — 99214 OFFICE O/P EST MOD 30 MIN: CPT

## 2017-08-14 RX ORDER — MELATONIN
Qty: 30 TABLET | Refills: 0 | Status: SHIPPED | OUTPATIENT
Start: 2017-08-14 | End: 2017-09-21 | Stop reason: SDUPTHER

## 2017-08-14 ASSESSMENT — PAIN DESCRIPTION - ONSET: ONSET: ON-GOING

## 2017-08-14 ASSESSMENT — PAIN DESCRIPTION - ORIENTATION: ORIENTATION: RIGHT;LOWER

## 2017-08-14 ASSESSMENT — PAIN DESCRIPTION - FREQUENCY: FREQUENCY: INTERMITTENT

## 2017-08-14 ASSESSMENT — PAIN DESCRIPTION - PROGRESSION: CLINICAL_PROGRESSION: NOT CHANGED

## 2017-08-14 ASSESSMENT — PAIN DESCRIPTION - LOCATION: LOCATION: BACK;SHOULDER

## 2017-08-14 ASSESSMENT — ENCOUNTER SYMPTOMS
SHORTNESS OF BREATH: 0
CONSTIPATION: 0
COUGH: 0
BACK PAIN: 1

## 2017-08-14 ASSESSMENT — PAIN DESCRIPTION - PAIN TYPE: TYPE: CHRONIC PAIN

## 2017-08-14 ASSESSMENT — PAIN DESCRIPTION - DESCRIPTORS: DESCRIPTORS: ACHING;STABBING;CONSTANT

## 2017-08-14 ASSESSMENT — PAIN SCALES - GENERAL: PAINLEVEL_OUTOF10: 8

## 2017-08-21 ENCOUNTER — OFFICE VISIT (OUTPATIENT)
Dept: PODIATRY | Age: 57
End: 2017-08-21
Payer: COMMERCIAL

## 2017-08-21 VITALS
HEART RATE: 68 BPM | BODY MASS INDEX: 28.04 KG/M2 | HEIGHT: 72 IN | DIASTOLIC BLOOD PRESSURE: 79 MMHG | SYSTOLIC BLOOD PRESSURE: 132 MMHG | WEIGHT: 207 LBS

## 2017-08-21 DIAGNOSIS — B35.3 TINEA PEDIS OF BOTH FEET: ICD-10-CM

## 2017-08-21 DIAGNOSIS — M79.675 GREAT TOE PAIN, LEFT: Primary | ICD-10-CM

## 2017-08-21 DIAGNOSIS — B35.1 ONYCHOMYCOSIS: ICD-10-CM

## 2017-08-21 DIAGNOSIS — R73.03 PRE-DIABETES: ICD-10-CM

## 2017-08-21 PROCEDURE — 99203 OFFICE O/P NEW LOW 30 MIN: CPT | Performed by: STUDENT IN AN ORGANIZED HEALTH CARE EDUCATION/TRAINING PROGRAM

## 2017-08-21 RX ORDER — KETOCONAZOLE 20 MG/G
CREAM TOPICAL
Qty: 30 G | Refills: 1 | Status: SHIPPED | OUTPATIENT
Start: 2017-08-21

## 2017-08-23 ENCOUNTER — HOSPITAL ENCOUNTER (OUTPATIENT)
Age: 57
Discharge: HOME | End: 2017-08-23
Payer: COMMERCIAL

## 2017-08-23 ENCOUNTER — HOSPITAL ENCOUNTER (OUTPATIENT)
Dept: GENERAL RADIOLOGY | Age: 57
Discharge: HOME | End: 2017-08-23
Payer: COMMERCIAL

## 2017-08-23 DIAGNOSIS — M79.675 GREAT TOE PAIN, LEFT: ICD-10-CM

## 2017-08-23 PROCEDURE — 73630 X-RAY EXAM OF FOOT: CPT

## 2017-09-14 RX ORDER — ATORVASTATIN CALCIUM 20 MG/1
TABLET, FILM COATED ORAL
Qty: 30 TABLET | Refills: 0 | Status: SHIPPED | OUTPATIENT
Start: 2017-09-14 | End: 2017-09-21 | Stop reason: SDUPTHER

## 2017-09-21 ENCOUNTER — OFFICE VISIT (OUTPATIENT)
Dept: FAMILY MEDICINE CLINIC | Age: 57
End: 2017-09-21
Payer: COMMERCIAL

## 2017-09-21 VITALS
SYSTOLIC BLOOD PRESSURE: 140 MMHG | HEART RATE: 67 BPM | HEIGHT: 72 IN | TEMPERATURE: 96.7 F | DIASTOLIC BLOOD PRESSURE: 83 MMHG | BODY MASS INDEX: 27.74 KG/M2 | WEIGHT: 204.8 LBS

## 2017-09-21 DIAGNOSIS — F32.A DEPRESSION, UNSPECIFIED DEPRESSION TYPE: ICD-10-CM

## 2017-09-21 DIAGNOSIS — E55.9 VITAMIN D DEFICIENCY: ICD-10-CM

## 2017-09-21 DIAGNOSIS — Z76.0 MEDICATION REFILL: ICD-10-CM

## 2017-09-21 DIAGNOSIS — M54.42 CHRONIC RIGHT-SIDED LOW BACK PAIN WITH BILATERAL SCIATICA: Primary | ICD-10-CM

## 2017-09-21 DIAGNOSIS — G89.29 CHRONIC RIGHT-SIDED LOW BACK PAIN WITH BILATERAL SCIATICA: Primary | ICD-10-CM

## 2017-09-21 DIAGNOSIS — M54.41 CHRONIC RIGHT-SIDED LOW BACK PAIN WITH BILATERAL SCIATICA: Primary | ICD-10-CM

## 2017-09-21 PROCEDURE — 99213 OFFICE O/P EST LOW 20 MIN: CPT | Performed by: HOSPITALIST

## 2017-09-21 RX ORDER — GABAPENTIN 300 MG/1
CAPSULE ORAL
Qty: 90 CAPSULE | Refills: 0 | Status: CANCELLED | OUTPATIENT
Start: 2017-09-21

## 2017-09-21 RX ORDER — NABUMETONE 750 MG/1
750 TABLET, FILM COATED ORAL 2 TIMES DAILY
Qty: 60 TABLET | Refills: 3 | Status: SHIPPED | OUTPATIENT
Start: 2017-09-21 | End: 2018-01-16 | Stop reason: SDUPTHER

## 2017-09-21 RX ORDER — BUPROPION HYDROCHLORIDE 150 MG/1
150 TABLET ORAL EVERY MORNING
Qty: 30 TABLET | Refills: 3 | Status: SHIPPED | OUTPATIENT
Start: 2017-09-21 | End: 2018-01-16 | Stop reason: SDUPTHER

## 2017-09-21 RX ORDER — ATORVASTATIN CALCIUM 20 MG/1
TABLET, FILM COATED ORAL
Qty: 30 TABLET | Refills: 0 | Status: SHIPPED | OUTPATIENT
Start: 2017-09-21

## 2017-09-21 RX ORDER — CYCLOBENZAPRINE HCL 10 MG
10 TABLET ORAL 2 TIMES DAILY PRN
Qty: 30 TABLET | Refills: 0 | Status: SHIPPED | OUTPATIENT
Start: 2017-09-21 | End: 2017-10-28 | Stop reason: SDUPTHER

## 2017-09-21 RX ORDER — ALBUTEROL SULFATE 90 UG/1
2 AEROSOL, METERED RESPIRATORY (INHALATION) 2 TIMES DAILY PRN
Qty: 3 INHALER | Refills: 0 | Status: SHIPPED | OUTPATIENT
Start: 2017-09-21

## 2017-09-21 RX ORDER — GABAPENTIN 400 MG/1
400 CAPSULE ORAL 4 TIMES DAILY
Qty: 90 CAPSULE | Refills: 3 | Status: SHIPPED | OUTPATIENT
Start: 2017-09-21 | End: 2018-01-16 | Stop reason: SDUPTHER

## 2017-09-21 RX ORDER — NAPROXEN 375 MG/1
375 TABLET ORAL 2 TIMES DAILY WITH MEALS
Qty: 60 TABLET | Status: CANCELLED | OUTPATIENT
Start: 2017-09-21

## 2017-09-21 RX ORDER — DULOXETIN HYDROCHLORIDE 20 MG/1
20 CAPSULE, DELAYED RELEASE ORAL DAILY
Qty: 30 CAPSULE | Refills: 2 | Status: SHIPPED | OUTPATIENT
Start: 2017-09-21 | End: 2018-01-29 | Stop reason: SDUPTHER

## 2017-09-21 RX ORDER — MELATONIN
Qty: 30 TABLET | Refills: 0 | Status: SHIPPED | OUTPATIENT
Start: 2017-09-21 | End: 2017-10-28 | Stop reason: SDUPTHER

## 2017-09-21 ASSESSMENT — ENCOUNTER SYMPTOMS
WHEEZING: 0
APNEA: 0
BACK PAIN: 1
SHORTNESS OF BREATH: 0
CHOKING: 0
ABDOMINAL DISTENTION: 0

## 2017-09-22 DIAGNOSIS — R73.03 PRE-DIABETES: ICD-10-CM

## 2017-09-22 RX ORDER — METFORMIN HYDROCHLORIDE 500 MG/1
TABLET, EXTENDED RELEASE ORAL
Qty: 30 TABLET | Refills: 0 | Status: SHIPPED | OUTPATIENT
Start: 2017-09-22

## 2017-10-28 DIAGNOSIS — G89.29 CHRONIC RIGHT-SIDED LOW BACK PAIN WITH BILATERAL SCIATICA: ICD-10-CM

## 2017-10-28 DIAGNOSIS — M54.42 CHRONIC RIGHT-SIDED LOW BACK PAIN WITH BILATERAL SCIATICA: ICD-10-CM

## 2017-10-28 DIAGNOSIS — E55.9 VITAMIN D DEFICIENCY: ICD-10-CM

## 2017-10-28 DIAGNOSIS — Z76.0 MEDICATION REFILL: ICD-10-CM

## 2017-10-28 DIAGNOSIS — M54.41 CHRONIC RIGHT-SIDED LOW BACK PAIN WITH BILATERAL SCIATICA: ICD-10-CM

## 2017-10-30 RX ORDER — CYCLOBENZAPRINE HCL 10 MG
TABLET ORAL
Qty: 30 TABLET | Refills: 0 | Status: SHIPPED | OUTPATIENT
Start: 2017-10-30 | End: 2017-12-03 | Stop reason: SDUPTHER

## 2017-10-30 RX ORDER — ATORVASTATIN CALCIUM 20 MG/1
TABLET, FILM COATED ORAL
Qty: 30 TABLET | Refills: 0 | Status: SHIPPED | OUTPATIENT
Start: 2017-10-30

## 2017-10-30 RX ORDER — MELATONIN
Qty: 30 TABLET | Refills: 0 | Status: SHIPPED | OUTPATIENT
Start: 2017-10-30

## 2017-12-03 DIAGNOSIS — M54.42 CHRONIC RIGHT-SIDED LOW BACK PAIN WITH BILATERAL SCIATICA: ICD-10-CM

## 2017-12-03 DIAGNOSIS — M54.41 CHRONIC RIGHT-SIDED LOW BACK PAIN WITH BILATERAL SCIATICA: ICD-10-CM

## 2017-12-03 DIAGNOSIS — Z76.0 MEDICATION REFILL: ICD-10-CM

## 2017-12-03 DIAGNOSIS — G89.29 CHRONIC RIGHT-SIDED LOW BACK PAIN WITH BILATERAL SCIATICA: ICD-10-CM

## 2017-12-05 RX ORDER — CYCLOBENZAPRINE HCL 10 MG
TABLET ORAL
Qty: 30 TABLET | Refills: 0 | Status: SHIPPED | OUTPATIENT
Start: 2017-12-05 | End: 2018-01-16 | Stop reason: SDUPTHER

## 2018-01-16 DIAGNOSIS — G89.29 CHRONIC RIGHT-SIDED LOW BACK PAIN WITH BILATERAL SCIATICA: ICD-10-CM

## 2018-01-16 DIAGNOSIS — M54.41 CHRONIC RIGHT-SIDED LOW BACK PAIN WITH BILATERAL SCIATICA: ICD-10-CM

## 2018-01-16 DIAGNOSIS — M54.42 CHRONIC RIGHT-SIDED LOW BACK PAIN WITH BILATERAL SCIATICA: ICD-10-CM

## 2018-01-16 DIAGNOSIS — Z76.0 MEDICATION REFILL: ICD-10-CM

## 2018-01-16 DIAGNOSIS — F32.A DEPRESSION, UNSPECIFIED DEPRESSION TYPE: ICD-10-CM

## 2018-01-17 RX ORDER — BUPROPION HYDROCHLORIDE 150 MG/1
TABLET ORAL
Qty: 30 TABLET | Refills: 10 | Status: SHIPPED | OUTPATIENT
Start: 2018-01-17

## 2018-01-17 RX ORDER — GABAPENTIN 400 MG/1
CAPSULE ORAL
Qty: 90 CAPSULE | Refills: 10 | Status: SHIPPED | OUTPATIENT
Start: 2018-01-17 | End: 2018-04-10 | Stop reason: SDUPTHER

## 2018-01-17 RX ORDER — NABUMETONE 750 MG/1
TABLET, FILM COATED ORAL
Qty: 60 TABLET | Refills: 10 | Status: SHIPPED | OUTPATIENT
Start: 2018-01-17

## 2018-01-17 RX ORDER — CYCLOBENZAPRINE HCL 10 MG
TABLET ORAL
Qty: 30 TABLET | Refills: 10 | Status: SHIPPED | OUTPATIENT
Start: 2018-01-17

## 2018-01-18 ENCOUNTER — TELEPHONE (OUTPATIENT)
Dept: FAMILY MEDICINE CLINIC | Age: 58
End: 2018-01-18

## 2018-01-29 DIAGNOSIS — Z76.0 MEDICATION REFILL: ICD-10-CM

## 2018-01-29 NOTE — TELEPHONE ENCOUNTER
Please address the medication refill and close the encounter. If I can be of assistance, please route to the applicable pool. Thank you. Next Visit Date:  No future appointments.     Health Maintenance   Topic Date Due    Flu vaccine (1) 09/05/2018 (Originally 9/1/2017)    A1C test (Diabetic or Prediabetic)  07/11/2018    Lipid screen  12/19/2021    Colon cancer screen colonoscopy  03/17/2025    DTaP/Tdap/Td vaccine (2 - Td) 12/01/2026    Pneumococcal med risk  Completed    Hepatitis C screen  Completed    HIV screen  Completed       Hemoglobin A1C (%)   Date Value   07/11/2017 6.1   12/19/2016 6.3 (H)   12/01/2016 5.9             ( goal A1C is < 7)   No results found for: LABMICR  LDL Cholesterol (mg/dL)   Date Value   12/19/2016 103       (goal LDL is <100)   AST (U/L)   Date Value   12/19/2016 19     ALT (U/L)   Date Value   12/19/2016 16     BUN (mg/dL)   Date Value   12/19/2016 13     BP Readings from Last 3 Encounters:   09/21/17 (!) 140/83   08/21/17 132/79   08/14/17 139/61          (goal 120/80)    All Future Testing planned in [facility]  Lab Frequency Next Occurrence   MRI Lumbar Spine Wo Contrast Once 05/04/2017               Patient Active Problem List:     Cancer [facility])     Chronic back pain     Lung cancer ([facility] [address]     Displacement of lumbar intervertebral disc without myelopathy     Other symptoms referable to back     Marijuana abuse     Major depression     Tubular adenoma of colon     Prediabetes     Depression     Vitamin D deficiency     Need for pneumococcal vaccination     Shoulder pain

## 2018-01-31 RX ORDER — DULOXETIN HYDROCHLORIDE 20 MG/1
CAPSULE, DELAYED RELEASE ORAL
Qty: 60 CAPSULE | Refills: 10 | Status: SHIPPED | OUTPATIENT
Start: 2018-01-31

## 2018-04-10 DIAGNOSIS — M54.41 CHRONIC RIGHT-SIDED LOW BACK PAIN WITH BILATERAL SCIATICA: ICD-10-CM

## 2018-04-10 DIAGNOSIS — Z76.0 MEDICATION REFILL: ICD-10-CM

## 2018-04-10 DIAGNOSIS — G89.29 CHRONIC RIGHT-SIDED LOW BACK PAIN WITH BILATERAL SCIATICA: ICD-10-CM

## 2018-04-10 DIAGNOSIS — M54.42 CHRONIC RIGHT-SIDED LOW BACK PAIN WITH BILATERAL SCIATICA: ICD-10-CM

## 2018-04-10 RX ORDER — GABAPENTIN 400 MG/1
CAPSULE ORAL
Qty: 120 CAPSULE | Refills: 11 | Status: SHIPPED | OUTPATIENT
Start: 2018-04-10 | End: 2018-05-10

## 2019-07-11 ENCOUNTER — HOSPITAL ENCOUNTER (EMERGENCY)
Age: 59
Discharge: HOME | End: 2019-07-11
Attending: EMERGENCY MEDICINE
Payer: COMMERCIAL

## 2019-07-11 VITALS
HEIGHT: 72 IN | DIASTOLIC BLOOD PRESSURE: 73 MMHG | SYSTOLIC BLOOD PRESSURE: 118 MMHG | BODY MASS INDEX: 24.11 KG/M2 | WEIGHT: 178 LBS | TEMPERATURE: 97.7 F | OXYGEN SATURATION: 97 % | RESPIRATION RATE: 10 BRPM | HEART RATE: 64 BPM

## 2019-07-11 DIAGNOSIS — S09.90XA CLOSED HEAD INJURY, INITIAL ENCOUNTER: Primary | ICD-10-CM

## 2019-07-11 DIAGNOSIS — T07.XXXA MULTIPLE CONTUSIONS: ICD-10-CM

## 2019-07-11 PROCEDURE — 93005 ELECTROCARDIOGRAM TRACING: CPT

## 2019-07-11 PROCEDURE — 99284 EMERGENCY DEPT VISIT MOD MDM: CPT

## 2019-07-11 PROCEDURE — 6370000000 HC RX 637 (ALT 250 FOR IP): Performed by: GENERAL PRACTICE

## 2019-07-11 RX ORDER — IBUPROFEN 400 MG/1
400 TABLET ORAL ONCE
Status: COMPLETED | OUTPATIENT
Start: 2019-07-11 | End: 2019-07-11

## 2019-07-11 RX ADMIN — IBUPROFEN 400 MG: 400 TABLET, FILM COATED ORAL at 09:51

## 2019-07-11 ASSESSMENT — ENCOUNTER SYMPTOMS
CHEST TIGHTNESS: 0
PHOTOPHOBIA: 0
BACK PAIN: 1
COUGH: 0
SORE THROAT: 0
EYE PAIN: 0
ABDOMINAL PAIN: 0
SHORTNESS OF BREATH: 0
FACIAL SWELLING: 0
TROUBLE SWALLOWING: 0

## 2019-07-11 ASSESSMENT — PAIN SCALES - GENERAL: PAINLEVEL_OUTOF10: 10

## 2019-07-11 ASSESSMENT — PAIN DESCRIPTION - PAIN TYPE: TYPE: ACUTE PAIN

## 2019-07-11 NOTE — ED PROVIDER NOTES
and fever. HENT: Negative for facial swelling, hearing loss, sore throat and trouble swallowing. Eyes: Negative for photophobia and pain. Respiratory: Negative for cough, chest tightness and shortness of breath. Cardiovascular: Negative for chest pain, palpitations and leg swelling. Gastrointestinal: Negative for abdominal pain. Genitourinary: Negative for dysuria. Musculoskeletal: Positive for arthralgias, back pain and neck pain. Negative for gait problem. Planing of acute on chronic neck and shoulder pain. Chronic back pain. Neurological: Negative for dizziness, weakness, light-headedness and headaches. Psychiatric/Behavioral: Negative for agitation and behavioral problems. The patient is not nervous/anxious. PHYSICAL EXAM   (up to 7 for level 4, 8 or more for level 5)      INITIAL VITALS:   Pulse 65   Temp 97.7 °F (36.5 °C) (Oral)   Resp 16   Ht 6' (1.829 m)   Wt 178 lb (80.7 kg)   SpO2 97%   BMI 24.14 kg/m²     Physical Exam   Constitutional: He is oriented to person, place, and time. He appears well-developed and well-nourished. No distress. HENT:   Head: Normocephalic. Right Ear: External ear normal.   Left Ear: External ear normal.   Nose: Nose normal.   Mouth/Throat: Oropharynx is clear and moist.   Small superficial abrasion on the right forehead, no active bleeding noted. Less than 1 inch diameter swelling to occipital area, no open wound noted. Patient has no teeth. No dentures present. Eyes: Pupils are equal, round, and reactive to light. EOM are normal.   Neck: Normal range of motion. Neck supple. No JVD present. Cardiovascular: Normal rate, regular rhythm and normal heart sounds. Pulmonary/Chest: Effort normal and breath sounds normal. No stridor. No respiratory distress. Abdominal: Soft. There is no tenderness. Musculoskeletal: Normal range of motion. Small amount of ecchymosis at the left scapular region. No point tenderness. Neurological: He is alert and oriented to person, place, and time. Skin: Skin is warm and dry. Psychiatric: He has a normal mood and affect. His behavior is normal. Judgment and thought content normal.       DIFFERENTIAL  DIAGNOSIS     PLAN (LABS / IMAGING / EKG):  Orders Placed This Encounter   Procedures    EKG 12 Lead    Insert peripheral IV       MEDICATIONS ORDERED:  No orders of the defined types were placed in this encounter. DDX: Closed head injury, contusion, low-grade concussion, shoulder sprain, less likely: Epidural versus subdural, fracture or dislocation    DIAGNOSTIC RESULTS / EMERGENCY DEPARTMENT COURSE / MDM   :  No results found for this visit on 07/11/19. IMPRESSION:     RADIOLOGY:  None    EKG  EKG Interpretation    Interpreted by emergency department physician    Rhythm: normal sinus  Rate:  normal  Axis: normal  Ectopy: none  Conduction: normal  ST Segments: normal, no acute infarction evident  T Waves: normal  Q Waves: none    EKG Interpretation:  Normal EKG      All EKG's are interpreted by the Emergency Department Physician who either signs or Co-signs this chart in the absence of a cardiologist.    EMERGENCY DEPARTMENT COURSE:  Patient self presented to the emergency department complaining of head and neck and shoulder pain after sustaining a approximate 2 foot fall onto concrete approximately 12 hours ago. Exam non-concerning for epidural versus subdural or fracture. Symptoms most likely due to sprain/low-grade concussion, shoulder contusion. Patient has a prescription for Flexeril and naproxen, patient was given ice pack and 1 dose of Motrin in the emergency department. Patient has follow-up with primary care provider tomorrow. Patient instructed return precautions. PROCEDURES:  None    CONSULTS:  None    CRITICAL CARE:  None    FINAL IMPRESSION      1. Closed head injury, initial encounter    2.  Multiple contusions          DISPOSITION / PLAN     DISPOSITION

## 2019-07-11 NOTE — ED PROVIDER NOTES
I performed a history and physical examination of the patient and discussed management with the resident. I reviewed the residents note and agree with the documented findings and plan of care. Any areas of disagreement are noted on the chart. I was personally present for the key portions of any procedures. I have documented in the chart those procedures where I was not present during the key portions. I have reviewed the emergency nurses triage note. I agree with the chief complaint, past medical history, past surgical history, allergies, medications, social and family history as documented unless otherwise noted below. Documentation of the HPI, Physical Exam and Medical Decision Making performed by medical students or scribes is based on my personal performance of the HPI, PE and MDM. For Phys Assistant/ Nurse Practitioner cases/documentation I have personally evaluated this patient and have completed at least one if not all key elements of the E/M (history, physical exam, and MDM). I find the patient's history and physical exam are consistent with the NP/PA documentation. I agree with the care provided, treatment rendered, disposition and followup plan. Additional findings are as noted. Shiloh Ferrer. Genie Mcelroy MD  Attending Emergency  Physician     EKG Interpretation    Interpreted by me    Rhythm: normal sinus   Rate: normal  Axis: normal  Ectopy: none  Conduction: normal  ST Segments: no acute change  T Waves: no acute change  Q Waves: none    Clinical Impression: no acute changes and normal EKG  In comparison with prior tracing dated 30 May 2014, there are no obvious significant morphologic changes noted. PATIENT REPORTS HE TRIPPED OVER HIS DOG'S LEASH WHILE WALKING OFF HIS PORCH LAST NIGHT, CAUSING HIM TO FALL, STRIKING HIS HEAD, RIGHT CHEST AND LEFT BACK ON SIDEWALK. NO LOC. C/O HEADACHE, PAIN IN RIGHT ANT CHEST AND LEFT BACK. NO SYNCOPE, PALPITATIONS. HX OF HTN, HYPERCHOLESTEROLEMIA, TYPE 2 DM.  TAKING ALL MEDS AS RX'ED. NOT ON ANTICOAGULANTS. NO AMNESTIC PERIOD, NUMBNESS, WEAKNESS, TINGLING, DIST VISION/SMELL/TASTE/HEARING/SPEECH, DIZZINESS/VERTIGO, NAUSEA, VOMITING. AWAKE, ALERT. COOP, RESP, ORIENTED X4. GCS-15. PERRL, EOMI, FUNDI-FLAT DISCS, SHARP MARGINS, NO HEMORRHAGE OR EXUDATE. CN'S II-XII INTACT. NECK SUPPLE, MILD RADHA PARACERVICAL MUSCLE TENDERNESS. NO MIDLINE OR DORSAL SPINOUS PROC TENDERNESS, CREPITUS, SWELLING. GAIT NORMAL. SPEECH FLUENT, NORMAL COMPREHENSION. BACK-MILD CONTUSION OVER LEFT SCAPULA. NO SWELLING. NORMAL AROM OF ARM ON SHOULDER ON OBSERVATION. CHEST-NO CREPITUS, DEFORMITY, PALP BONY ABN. SCALP-MILD SWELLING, TENDERNESS OVER VERTEX AND RIGHT FRONTAL SCALP. NO CREPITUS, DEFORMITY, PALP BONY ABN. BACK-NONTENDER EXCEPT OVER SCAPULA. IMP-FALL, CONTUSIONS, ABRASIONS, CHI. PLAN-DISCHARGE, ICEPACKS, CONTINUE PREVIOUSLY RX'ED NSAID, CYCLOBENZAPRINE, F/U WITH DR. Apolonia Vidal TODAY. RETURN IF SX WORSEN OR PROGRESS.        Nahun Stock MD  07/11/19 [id number] Michael Moser MD  07/11/19 4754 [facility] Eleanor Yun MD  07/11/19 [id number]

## 2019-07-13 LAB
EKG ATRIAL RATE: 72 BPM
EKG P AXIS: 86 DEGREES
EKG P-R INTERVAL: 130 MS
EKG Q-T INTERVAL: 380 MS
EKG QRS DURATION: 82 MS
EKG QTC CALCULATION (BAZETT): 416 MS
EKG R AXIS: 63 DEGREES
EKG T AXIS: 58 DEGREES
EKG VENTRICULAR RATE: 72 BPM

## 2019-07-31 ENCOUNTER — HOSPITAL ENCOUNTER (OUTPATIENT)
Age: 59
Discharge: HOME | End: 2019-08-02
Payer: COMMERCIAL

## 2019-07-31 ENCOUNTER — HOSPITAL ENCOUNTER (OUTPATIENT)
Dept: GENERAL RADIOLOGY | Age: 59
Discharge: HOME | End: 2019-08-02
Payer: COMMERCIAL

## 2019-07-31 DIAGNOSIS — S17.9XXA: ICD-10-CM

## 2019-07-31 DIAGNOSIS — S49.90XA INJURY OF SHOULDER AND UPPER ARM, UNSPECIFIED LATERALITY, INITIAL ENCOUNTER: ICD-10-CM

## 2019-07-31 PROCEDURE — 73030 X-RAY EXAM OF SHOULDER: CPT

## 2019-07-31 PROCEDURE — 72040 X-RAY EXAM NECK SPINE 2-3 VW: CPT

## 2020-07-23 ENCOUNTER — HOSPITAL ENCOUNTER (OUTPATIENT)
Age: 60
Discharge: HOME | End: 2020-07-23
Payer: MEDICARE

## 2020-07-23 LAB
ALBUMIN SERPL-MCNC: 4.6 G/DL (ref 3.5–5.2)
ALBUMIN/GLOBULIN RATIO: 1.8 (ref 1–2.5)
ALP BLD-CCNC: 83 U/L (ref 40–129)
ALT SERPL-CCNC: 18 U/L (ref 5–41)
ANION GAP SERPL CALCULATED.3IONS-SCNC: 11 MMOL/L (ref 9–17)
AST SERPL-CCNC: 22 U/L
BILIRUB SERPL-MCNC: 1.19 MG/DL (ref 0.3–1.2)
BUN BLDV-MCNC: 14 MG/DL (ref 8–23)
BUN/CREAT BLD: ABNORMAL (ref 9–20)
CALCIUM SERPL-MCNC: 9.5 MG/DL (ref 8.6–10.4)
CHLORIDE BLD-SCNC: 103 MMOL/L (ref 98–107)
CHOLESTEROL/HDL RATIO: 3.5
CHOLESTEROL: 181 MG/DL
CO2: 24 MMOL/L (ref 20–31)
CREAT SERPL-MCNC: 1.11 MG/DL (ref 0.7–1.2)
CREATININE URINE: 373.7 MG/DL (ref 39–259)
ESTIMATED AVERAGE GLUCOSE: 123 MG/DL
GFR AFRICAN AMERICAN: >60 ML/MIN
GFR NON-AFRICAN AMERICAN: >60 ML/MIN
GFR SERPL CREATININE-BSD FRML MDRD: ABNORMAL ML/MIN/{1.73_M2}
GFR SERPL CREATININE-BSD FRML MDRD: ABNORMAL ML/MIN/{1.73_M2}
GLUCOSE BLD-MCNC: 104 MG/DL (ref 70–99)
HBA1C MFR BLD: 5.9 % (ref 4–6)
HDLC SERPL-MCNC: 51 MG/DL
LDL CHOLESTEROL: 105 MG/DL (ref 0–130)
MICROALBUMIN/CREAT 24H UR: 28 MG/L
MICROALBUMIN/CREAT UR-RTO: 7 MCG/MG CREAT
POTASSIUM SERPL-SCNC: 3.8 MMOL/L (ref 3.7–5.3)
SODIUM BLD-SCNC: 138 MMOL/L (ref 135–144)
THYROXINE, FREE: 0.87 NG/DL (ref 0.93–1.7)
TOTAL PROTEIN: 7.2 G/DL (ref 6.4–8.3)
TRIGL SERPL-MCNC: 126 MG/DL
TSH SERPL DL<=0.05 MIU/L-ACNC: 0.8 MIU/L (ref 0.3–5)
VITAMIN D 25-HYDROXY: 43.3 NG/ML (ref 30–100)
VLDLC SERPL CALC-MCNC: NORMAL MG/DL (ref 1–30)

## 2020-07-23 PROCEDURE — 36415 COLL VENOUS BLD VENIPUNCTURE: CPT

## 2020-07-23 PROCEDURE — 84443 ASSAY THYROID STIM HORMONE: CPT

## 2020-07-23 PROCEDURE — 84439 ASSAY OF FREE THYROXINE: CPT

## 2020-07-23 PROCEDURE — 80061 LIPID PANEL: CPT

## 2020-07-23 PROCEDURE — 82570 ASSAY OF URINE CREATININE: CPT

## 2020-07-23 PROCEDURE — 83036 HEMOGLOBIN GLYCOSYLATED A1C: CPT

## 2020-07-23 PROCEDURE — 82043 UR ALBUMIN QUANTITATIVE: CPT

## 2020-07-23 PROCEDURE — 80053 COMPREHEN METABOLIC PANEL: CPT

## 2020-07-23 PROCEDURE — 82306 VITAMIN D 25 HYDROXY: CPT

## 2020-07-23 PROCEDURE — 87522 HEPATITIS C REVRS TRNSCRPJ: CPT

## 2020-07-24 LAB
DIRECT EXAM: NORMAL
Lab: NORMAL
SPECIMEN DESCRIPTION: NORMAL

## 2021-05-03 ENCOUNTER — HOSPITAL ENCOUNTER (OUTPATIENT)
Age: 61
Discharge: HOME | End: 2021-05-03
Payer: MEDICARE

## 2021-05-03 LAB
ABSOLUTE EOS #: 0.08 K/UL (ref 0–0.44)
ABSOLUTE IMMATURE GRANULOCYTE: <0.03 K/UL (ref 0–0.3)
ABSOLUTE LYMPH #: 2.49 K/UL (ref 1.1–3.7)
ABSOLUTE MONO #: 0.62 K/UL (ref 0.1–1.2)
BASOPHILS # BLD: 1 % (ref 0–2)
BASOPHILS ABSOLUTE: 0.07 K/UL (ref 0–0.2)
D-DIMER QUANTITATIVE: 0.46 MG/L FEU (ref 0–0.59)
DIFFERENTIAL TYPE: NORMAL
EOSINOPHILS RELATIVE PERCENT: 1 % (ref 1–4)
HCT VFR BLD CALC: 44.7 % (ref 40.7–50.3)
HEMOGLOBIN: 14.7 G/DL (ref 13–17)
IMMATURE GRANULOCYTES: 0 %
LYMPHOCYTES # BLD: 39 % (ref 24–43)
MCH RBC QN AUTO: 31.5 PG (ref 25.2–33.5)
MCHC RBC AUTO-ENTMCNC: 32.9 G/DL (ref 28.4–34.8)
MCV RBC AUTO: 95.7 FL (ref 82.6–102.9)
MONOCYTES # BLD: 10 % (ref 3–12)
NRBC AUTOMATED: 0 PER 100 WBC
PDW BLD-RTO: 13.1 % (ref 11.8–14.4)
PLATELET # BLD: 258 K/UL (ref 138–453)
PLATELET ESTIMATE: NORMAL
PMV BLD AUTO: 9.4 FL (ref 8.1–13.5)
RBC # BLD: 4.67 M/UL (ref 4.21–5.77)
RBC # BLD: NORMAL 10*6/UL
SEG NEUTROPHILS: 49 % (ref 36–65)
SEGMENTED NEUTROPHILS ABSOLUTE COUNT: 3.16 K/UL (ref 1.5–8.1)
WBC # BLD: 6.4 K/UL (ref 3.5–11.3)
WBC # BLD: NORMAL 10*3/UL

## 2021-05-03 PROCEDURE — 85025 COMPLETE CBC W/AUTO DIFF WBC: CPT

## 2021-05-03 PROCEDURE — 36415 COLL VENOUS BLD VENIPUNCTURE: CPT

## 2021-05-03 PROCEDURE — 85379 FIBRIN DEGRADATION QUANT: CPT

## 2023-10-02 ENCOUNTER — HOSPITAL ENCOUNTER (OUTPATIENT)
Age: 63
Setting detail: SPECIMEN
Discharge: HOME | End: 2023-10-02

## 2023-10-02 ENCOUNTER — OFFICE VISIT (OUTPATIENT)
Dept: FAMILY MEDICINE CLINIC | Age: 63
End: 2023-10-02
Payer: MEDICARE

## 2023-10-02 VITALS
HEART RATE: 68 BPM | SYSTOLIC BLOOD PRESSURE: 120 MMHG | BODY MASS INDEX: 25.41 KG/M2 | WEIGHT: 187.6 LBS | DIASTOLIC BLOOD PRESSURE: 69 MMHG | HEIGHT: 72 IN

## 2023-10-02 DIAGNOSIS — C34.91 MALIGNANT NEOPLASM OF RIGHT LUNG, UNSPECIFIED PART OF LUNG (HCC): ICD-10-CM

## 2023-10-02 DIAGNOSIS — G89.29 CHRONIC BILATERAL LOW BACK PAIN WITH BILATERAL SCIATICA: Primary | ICD-10-CM

## 2023-10-02 DIAGNOSIS — Z72.0 TOBACCO ABUSE: ICD-10-CM

## 2023-10-02 DIAGNOSIS — E55.9 VITAMIN D DEFICIENCY: ICD-10-CM

## 2023-10-02 DIAGNOSIS — M54.42 CHRONIC BILATERAL LOW BACK PAIN WITH BILATERAL SCIATICA: Primary | ICD-10-CM

## 2023-10-02 DIAGNOSIS — R73.03 PREDIABETES: ICD-10-CM

## 2023-10-02 DIAGNOSIS — Z23 NEED FOR INFLUENZA VACCINATION: ICD-10-CM

## 2023-10-02 DIAGNOSIS — M54.41 CHRONIC BILATERAL LOW BACK PAIN WITH BILATERAL SCIATICA: Primary | ICD-10-CM

## 2023-10-02 DIAGNOSIS — R06.09 DYSPNEA ON EXERTION: ICD-10-CM

## 2023-10-02 DIAGNOSIS — H40.9 GLAUCOMA, UNSPECIFIED GLAUCOMA TYPE, UNSPECIFIED LATERALITY: ICD-10-CM

## 2023-10-02 LAB
25(OH)D3 SERPL-MCNC: 18.6 NG/ML
ALBUMIN SERPL-MCNC: 3.8 G/DL (ref 3.5–5.2)
ALBUMIN/GLOB SERPL: 1.4 {RATIO} (ref 1–2.5)
ALP SERPL-CCNC: 72 U/L (ref 40–129)
ALT SERPL-CCNC: 19 U/L (ref 5–41)
ANION GAP SERPL CALCULATED.3IONS-SCNC: 13 MMOL/L (ref 9–17)
AST SERPL-CCNC: 20 U/L
BASOPHILS # BLD: 0.06 K/UL (ref 0–0.2)
BASOPHILS NFR BLD: 1 % (ref 0–2)
BILIRUB SERPL-MCNC: 0.3 MG/DL (ref 0.3–1.2)
BUN SERPL-MCNC: 13 MG/DL (ref 8–23)
CALCIUM SERPL-MCNC: 9.4 MG/DL (ref 8.6–10.4)
CHLORIDE SERPL-SCNC: 101 MMOL/L (ref 98–107)
CO2 SERPL-SCNC: 23 MMOL/L (ref 20–31)
CREAT SERPL-MCNC: 1.2 MG/DL (ref 0.7–1.2)
EOSINOPHIL # BLD: 0.1 K/UL (ref 0–0.44)
EOSINOPHILS RELATIVE PERCENT: 2 % (ref 1–4)
ERYTHROCYTE [DISTWIDTH] IN BLOOD BY AUTOMATED COUNT: 13.2 % (ref 11.8–14.4)
GFR SERPL CREATININE-BSD FRML MDRD: >60 ML/MIN/1.73M2
GLUCOSE SERPL-MCNC: 102 MG/DL (ref 70–99)
HBA1C MFR BLD: 6 %
HCT VFR BLD AUTO: 43.4 % (ref 40.7–50.3)
HGB BLD-MCNC: 14 G/DL (ref 13–17)
IMM GRANULOCYTES # BLD AUTO: 0.04 K/UL (ref 0–0.3)
IMM GRANULOCYTES NFR BLD: 1 %
LYMPHOCYTES NFR BLD: 2.3 K/UL (ref 1.1–3.7)
LYMPHOCYTES RELATIVE PERCENT: 35 % (ref 24–43)
MCH RBC QN AUTO: 31 PG (ref 25.2–33.5)
MCHC RBC AUTO-ENTMCNC: 32.3 G/DL (ref 28.4–34.8)
MCV RBC AUTO: 96 FL (ref 82.6–102.9)
MONOCYTES NFR BLD: 0.54 K/UL (ref 0.1–1.2)
MONOCYTES NFR BLD: 8 % (ref 3–12)
NEUTROPHILS NFR BLD: 53 % (ref 36–65)
NEUTS SEG NFR BLD: 3.53 K/UL (ref 1.5–8.1)
NRBC BLD-RTO: 0 PER 100 WBC
PLATELET # BLD AUTO: 256 K/UL (ref 138–453)
PMV BLD AUTO: 9.2 FL (ref 8.1–13.5)
POTASSIUM SERPL-SCNC: 4.7 MMOL/L (ref 3.7–5.3)
PROT SERPL-MCNC: 6.5 G/DL (ref 6.4–8.3)
RBC # BLD AUTO: 4.52 M/UL (ref 4.21–5.77)
SODIUM SERPL-SCNC: 137 MMOL/L (ref 135–144)
WBC OTHER # BLD: 6.6 K/UL (ref 3.5–11.3)

## 2023-10-02 PROCEDURE — G8419 CALC BMI OUT NRM PARAM NOF/U: HCPCS

## 2023-10-02 PROCEDURE — 3017F COLORECTAL CA SCREEN DOC REV: CPT

## 2023-10-02 PROCEDURE — 1123F ACP DISCUSS/DSCN MKR DOCD: CPT

## 2023-10-02 PROCEDURE — 90686 IIV4 VACC NO PRSV 0.5 ML IM: CPT | Performed by: FAMILY MEDICINE

## 2023-10-02 PROCEDURE — 83036 HEMOGLOBIN GLYCOSYLATED A1C: CPT

## 2023-10-02 PROCEDURE — 4004F PT TOBACCO SCREEN RCVD TLK: CPT

## 2023-10-02 PROCEDURE — G8427 DOCREV CUR MEDS BY ELIG CLIN: HCPCS

## 2023-10-02 PROCEDURE — 99203 OFFICE O/P NEW LOW 30 MIN: CPT

## 2023-10-02 PROCEDURE — G8482 FLU IMMUNIZE ORDER/ADMIN: HCPCS

## 2023-10-02 RX ORDER — METFORMIN HYDROCHLORIDE 500 MG/1
500 TABLET, EXTENDED RELEASE ORAL
Qty: 90 TABLET | Refills: 1 | Status: SHIPPED | OUTPATIENT
Start: 2023-10-02

## 2023-10-02 RX ORDER — NICOTINE 21 MG/24HR
1 PATCH, TRANSDERMAL 24 HOURS TRANSDERMAL EVERY 24 HOURS
Qty: 14 PATCH | Refills: 4 | Status: SHIPPED | OUTPATIENT
Start: 2023-10-02

## 2023-10-02 SDOH — ECONOMIC STABILITY: FOOD INSECURITY: WITHIN THE PAST 12 MONTHS, THE FOOD YOU BOUGHT JUST DIDN'T LAST AND YOU DIDN'T HAVE MONEY TO GET MORE.: NEVER TRUE

## 2023-10-02 SDOH — ECONOMIC STABILITY: INCOME INSECURITY: HOW HARD IS IT FOR YOU TO PAY FOR THE VERY BASICS LIKE FOOD, HOUSING, MEDICAL CARE, AND HEATING?: NOT HARD AT ALL

## 2023-10-02 SDOH — ECONOMIC STABILITY: FOOD INSECURITY: WITHIN THE PAST 12 MONTHS, YOU WORRIED THAT YOUR FOOD WOULD RUN OUT BEFORE YOU GOT MONEY TO BUY MORE.: NEVER TRUE

## 2023-10-02 SDOH — ECONOMIC STABILITY: HOUSING INSECURITY
IN THE LAST 12 MONTHS, WAS THERE A TIME WHEN YOU DID NOT HAVE A STEADY PLACE TO SLEEP OR SLEPT IN A SHELTER (INCLUDING NOW)?: NO

## 2023-10-02 ASSESSMENT — ENCOUNTER SYMPTOMS
BACK PAIN: 1
SHORTNESS OF BREATH: 0
NAUSEA: 0
ABDOMINAL PAIN: 0
VOMITING: 0
COUGH: 0

## 2023-10-02 NOTE — PATIENT INSTRUCTIONS
Thank you for letting us take care of you today. We hope all your questions were addressed. If a question was overlooked or something else comes to mind after you return home, please contact a member of your Care Team listed below. Your Care Team at [address] [place] [address] is Team #  Cezar Solis, (Faculty)  Endy Rinaldi (Resident)  Jaguar Mobley (Resident)  Amy Lawrence (Resident)   Mario Hanson (Resident)  Lakisha Fisher (Resident)  Yeyo Melgoza., [facility]  Benton Zhang., [facility], [facility]  Shakira Mendoza, [facility]  Ellen Prasad, [facility]  Brenden Cruz, [facility]  Alexandra Mendoza [facility] [place] (Clinical Practice Manager)  Judy Gregory [facility] (Clinical Pharmacist)     Office phone number: [phone]    If you need to get in right away due to illness, please be advised we have \"Same Day\" appointments available Monday-Friday. Please call us at [phone] option #3 to schedule your \"Same Day\" appointment.

## 2023-10-02 NOTE — PROGRESS NOTES
Luca Neal (:  1960) is a 70 y.o. male,New patient, here for evaluation of the following chief complaint(s):  New Patient (Last seen in ) and Diabetes (Dm follow up)    ASSESSMENT/PLAN:    1. Chronic bilateral low back pain with bilateral sciatica  -     AFL - Zoraida Perez MD, Pain Management, [facility]  2. Prediabetes  -     POCT glycosylated hemoglobin (Hb A1C)  -     metFORMIN (GLUCOPHAGE-XR) 500 MG extended release tablet; Take 1 tablet by mouth daily (with breakfast), Disp-90 tablet, R-1Normal  -     [facility] Diabetes Education - [employer]  3. Tobacco abuse  -     nicotine (NICODERM CQ) 14 MG/24HR; Place 1 patch onto the skin every 24 hours, Disp-14 patch, R-4Normal  4. Dyspnea on exertion  -     Full PFT Study With Bronchodilator; Future  -     Echo (TTE) complete (PRN contrast/bubble/strain/3D); Future  5. Malignant neoplasm of right lung, unspecified part of lung ([address])  -     Abiola العراقي MD, Hematology/Oncology, [address] [place] [address] with Auto Differential; Future  -     Comprehensive Metabolic Panel; Future  6. Vitamin D deficiency  -     Vitamin D 25 Hydroxy; Future  7. Glaucoma, unspecified glaucoma type, unspecified laterality  -     AFL - Kristy Arenas MD, Ophthalmology, [facility]  8. Need for influenza vaccination  -     Influenza, FLUARIX, (age 10 mo+),  IM, Preservative Free, 0.5 mL    For pain, patient explicitly told that we will not prescribe any strong pain medication in this office and that he will need to follow-up with pain management. For patient reported history of glaucoma we will refer him to ophthalmology. For prediabetes we will start him on metformin 500 daily and also provide him with [facility] diabetes education. For patient's history of lung cancer we will refer him to heme-onc. For tobacco abuse patient is interested in the patch. We will start him on a 14 mg patch and follow-up in 6 weeks.     Return in about 6 weeks (around 2023), or if symptoms

## 2023-10-02 NOTE — PROGRESS NOTES
Diabetic visit information    BP Readings from Last 3 Encounters:   07/11/19 118/73   09/21/17 (!) 140/83   08/21/17 132/79       Hemoglobin A1C (%)   Date Value   07/23/2020 5.9   07/11/2017 6.1   12/19/2016 6.3 (H)     LDL Cholesterol (mg/dL)   Date Value   07/23/2020 105               Have you changed or started any medications since your last visit including any over-the-counter medicines, vitamins, or herbal medicines? no   Have you stopped taking any of your medications? Is so, why? -  no  Are you having any side effects from any of your medications? - no    Have you seen any other physician or provider since your last visit?  no   Have you had any other diagnostic tests since your last visit? no   Have you been seen in the emergency room and/or had an admission in a hospital since we last saw you?  no     Have you had your annual diabetic retinal (eye) exam? No   (ensure copy of exam is in the chart)    Have you had your routine dental cleaning in the past 6 months? no    Do you have an active [employer] account? If not, what are your barriers? No: Declines    Patient Care Team:  Rosi Barrientos MD as PCP - General (Family Medicine)  Aman Lake MD as Consulting Physician (Hematology and Oncology)    Medical history Review  Past Medical, Family, and Social History reviewed and does contribute to the patient presenting condition.     Health Maintenance   Topic Date Due    COVID-19 Vaccine (1) Never done    Depression Monitoring  Never done    Shingles vaccine (1 of 2) Never done    Pneumococcal 65+ years Vaccine (2 - PCV) 05/19/2018    Annual Wellness Visit (AWV)  Never done    A1C test (Diabetic or Prediabetic)  07/23/2021    Lipids  07/23/2021    AAA screen  Never done    Flu vaccine (1) 08/01/2023    Colorectal Cancer Screen  03/17/2025    DTaP/Tdap/Td vaccine (2 - Td or Tdap) 12/01/2026    Hepatitis C screen  Completed    HIV screen  Completed    Hepatitis A vaccine  Aged Out    Hepatitis B vaccine

## 2023-10-03 DIAGNOSIS — E55.9 VITAMIN D DEFICIENCY: Primary | ICD-10-CM

## 2023-10-03 RX ORDER — ERGOCALCIFEROL 1.25 MG/1
50000 CAPSULE ORAL WEEKLY
Qty: 12 CAPSULE | Refills: 1 | Status: SHIPPED | OUTPATIENT
Start: 2023-10-03

## 2023-10-03 NOTE — PROGRESS NOTES
Patient's Vit D is low. Sent Vit D 50,000 units prescription. Patient informed and notified of prescription sent to pharmacy.     Electronically signed by Hilda Gann MD on 10/3/2023 at 2:30 PM

## 2023-10-13 ENCOUNTER — HOSPITAL ENCOUNTER (OUTPATIENT)
Dept: DIABETES SERVICES | Age: 63
Setting detail: THERAPIES SERIES
Discharge: HOME | End: 2023-10-13
Payer: MEDICARE

## 2023-10-13 PROCEDURE — G0108 DIAB MANAGE TRN  PER INDIV: HCPCS

## 2023-10-13 SDOH — ECONOMIC STABILITY: FOOD INSECURITY: ADDITIONAL INFORMATION: YES

## 2023-10-13 ASSESSMENT — PROBLEM AREAS IN DIABETES QUESTIONNAIRE (PAID)
COPING WITH COMPLICATIONS OF DIABETES: 0
FEELING DEPRESSED WHEN YOU THINK ABOUT LIVING WITH DIABETES: 0
FEELING THAT DIABETES IS TAKING UP TOO MUCH OF YOUR MENTAL AND PHYSICAL ENERGY EVERY DAY: 0
FEELING SCARED WHEN YOU THINK ABOUT LIVING WITH DIABETES: 0
PAID-5 TOTAL SCORE: 0
WORRYING ABOUT THE FUTURE AND THE POSSIBILITY OF SERIOUS COMPLICATIONS: 0

## 2023-10-13 NOTE — PROGRESS NOTES
Points  N/A=Not Applicable  Topics/Learning Objectives Pre-session Assess Date:  10-13-23BB Instr. Date Reinforce Date Post- session Eval Comments   Diabetes disease process & Treatment process: Define diabetes & pre-diabetes; Identify own type of diabetes; role of the pancreas; signs/symptoms; diagnostic criteria; prevention & treatment options; contributing factors. 1 10-13-23BB  Discussed A1c values for dx of pre-diabetes    10-13-23 BB many family members with diabetes   Incorporating nutritional management into lifestyle: Describe effect of type, amount & timing of food on blood glucose; Describe basic meal planning techniques & current nutrition guideline   1    What to eat - Food groups, When to eat - timing of meals and snacks, and How much to eat - portions control. calories/ day   CHO choices/ meal   CHO choices/  day   grams of protein /day   gram of fat /day     Correctly read food labels & demonstrate CHO counting & portion control with personalized meal plan. Identify dining out strategies, & dietary sick day guidelines. 1    10-13-23 BB picky about the veggies he likes. States that he is trying to limit \"junk food\"   Incorporating physical activity into lifestyle:   Verbalize effect of exercise on blood glucose levels; benefits of regular exercise; safety considerations; contraindications; maintenance of activity. 1       Using medications safely:  Identify effects of diabetes medicines on blood glucose levels; List diabetes medication taken, action & side effects;    1 10-13-23BB   10-13-23 BB Metformin  mg ordered with BK daily. Patient has not started yet. After our appt I walked pt over to the [employer] - plan to pick it up    Insulin / Injectable - Appropriate injection sites; proper storage; supplies needed; proper technique; safe needle disposal guidelines.    1NA       Monitoring blood glucose, interpreting and using results:  Identify recommended & personal blood

## 2023-10-19 ENCOUNTER — TELEPHONE (OUTPATIENT)
Dept: ONCOLOGY | Age: 63
End: 2023-10-19

## 2023-10-19 NOTE — TELEPHONE ENCOUNTER
PT NO SHOWED AGAIN FOR APPOINTMENT. PER DR RICHARDS PT IS DISMISSED FROM PRACTICE. CERTIFIED LETTER MAILED.

## 2023-10-23 ENCOUNTER — TELEPHONE (OUTPATIENT)
Dept: FAMILY MEDICINE CLINIC | Age: 63
End: 2023-10-23

## 2023-10-24 NOTE — TELEPHONE ENCOUNTER
He does not require test strips. He is preDM and not on any insulin.  Thank you
Patient contacted office in need of dm test strips patient stated diabetes education gave him a few, he does have glucometer( one touch verio flex) in need of test strips. Please send to [address]. Thank you.
Writer spoke to patient and updated that per provider no need to test sugar prediabetic.
Lung cancer ([address])     Displacement of lumbar intervertebral disc without myelopathy     Other symptoms referable to back     Marijuana abuse     Major depression     Tubular adenoma of colon     Prediabetes     Depression     Vitamin D deficiency     Need for pneumococcal vaccination     Shoulder pain

## 2023-10-27 ENCOUNTER — HOSPITAL ENCOUNTER (OUTPATIENT)
Dept: DIABETES SERVICES | Age: 63
Setting detail: THERAPIES SERIES
Discharge: HOME | End: 2023-10-27
Payer: MEDICARE

## 2023-10-27 DIAGNOSIS — R73.03 PREDIABETES: Primary | ICD-10-CM

## 2023-10-27 PROCEDURE — G0108 DIAB MANAGE TRN  PER INDIV: HCPCS
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
## 2023-11-21 ENCOUNTER — OFFICE VISIT (OUTPATIENT)
Dept: FAMILY MEDICINE CLINIC | Age: 63
End: 2023-11-21
Payer: MEDICARE

## 2023-11-21 VITALS
BODY MASS INDEX: 25.03 KG/M2 | SYSTOLIC BLOOD PRESSURE: 110 MMHG | HEIGHT: 72 IN | WEIGHT: 184.8 LBS | DIASTOLIC BLOOD PRESSURE: 62 MMHG | HEART RATE: 64 BPM

## 2023-11-21 DIAGNOSIS — Z72.0 TOBACCO ABUSE: ICD-10-CM

## 2023-11-21 DIAGNOSIS — R06.09 DYSPNEA ON EXERTION: Primary | ICD-10-CM

## 2023-11-21 PROCEDURE — 99213 OFFICE O/P EST LOW 20 MIN: CPT

## 2023-11-21 PROCEDURE — G8427 DOCREV CUR MEDS BY ELIG CLIN: HCPCS

## 2023-11-21 PROCEDURE — 4004F PT TOBACCO SCREEN RCVD TLK: CPT

## 2023-11-21 PROCEDURE — 3017F COLORECTAL CA SCREEN DOC REV: CPT

## 2023-11-21 PROCEDURE — G8417 CALC BMI ABV UP PARAM F/U: HCPCS

## 2023-11-21 PROCEDURE — G8482 FLU IMMUNIZE ORDER/ADMIN: HCPCS

## 2023-11-21 PROCEDURE — 1123F ACP DISCUSS/DSCN MKR DOCD: CPT

## 2023-11-21 RX ORDER — NICOTINE 21 MG/24HR
1 PATCH, TRANSDERMAL 24 HOURS TRANSDERMAL EVERY 24 HOURS
Qty: 14 PATCH | Refills: 4 | Status: SHIPPED | OUTPATIENT
Start: 2023-11-21

## 2023-11-21 RX ORDER — ALBUTEROL SULFATE 90 UG/1
2 AEROSOL, METERED RESPIRATORY (INHALATION) 2 TIMES DAILY PRN
Qty: 1 EACH | Refills: 1 | Status: SHIPPED | OUTPATIENT
Start: 2023-11-21

## 2023-11-21 ASSESSMENT — PATIENT HEALTH QUESTIONNAIRE - PHQ9
SUM OF ALL RESPONSES TO PHQ QUESTIONS 1-9: 0
SUM OF ALL RESPONSES TO PHQ QUESTIONS 1-9: 0
4. FEELING TIRED OR HAVING LITTLE ENERGY: 0
SUM OF ALL RESPONSES TO PHQ QUESTIONS 1-9: 0
10. IF YOU CHECKED OFF ANY PROBLEMS, HOW DIFFICULT HAVE THESE PROBLEMS MADE IT FOR YOU TO DO YOUR WORK, TAKE CARE OF THINGS AT HOME, OR GET ALONG WITH OTHER PEOPLE: 0
3. TROUBLE FALLING OR STAYING ASLEEP: 0
2. FEELING DOWN, DEPRESSED OR HOPELESS: 0
5. POOR APPETITE OR OVEREATING: 0
SUM OF ALL RESPONSES TO PHQ QUESTIONS 1-9: 0
1. LITTLE INTEREST OR PLEASURE IN DOING THINGS: 0
SUM OF ALL RESPONSES TO PHQ9 QUESTIONS 1 & 2: 0
8. MOVING OR SPEAKING SO SLOWLY THAT OTHER PEOPLE COULD HAVE NOTICED. OR THE OPPOSITE, BEING SO FIGETY OR RESTLESS THAT YOU HAVE BEEN MOVING AROUND A LOT MORE THAN USUAL: 0
6. FEELING BAD ABOUT YOURSELF - OR THAT YOU ARE A FAILURE OR HAVE LET YOURSELF OR YOUR FAMILY DOWN: 0
7. TROUBLE CONCENTRATING ON THINGS, SUCH AS READING THE NEWSPAPER OR WATCHING TELEVISION: 0
9. THOUGHTS THAT YOU WOULD BE BETTER OFF DEAD, OR OF HURTING YOURSELF: 0

## 2023-11-21 ASSESSMENT — COLUMBIA-SUICIDE SEVERITY RATING SCALE - C-SSRS
7. DID THIS OCCUR IN THE LAST THREE MONTHS: NO
3. HAVE YOU BEEN THINKING ABOUT HOW YOU MIGHT KILL YOURSELF?: NO
4. HAVE YOU HAD THESE THOUGHTS AND HAD SOME INTENTION OF ACTING ON THEM?: NO
5. HAVE YOU STARTED TO WORK OUT OR WORKED OUT THE DETAILS OF HOW TO KILL YOURSELF? DO YOU INTEND TO CARRY OUT THIS PLAN?: NO

## 2023-11-21 ASSESSMENT — ENCOUNTER SYMPTOMS
COUGH: 0
VOMITING: 0
DIARRHEA: 0
EYE PAIN: 0
CONSTIPATION: 0
SHORTNESS OF BREATH: 0
ABDOMINAL PAIN: 0
WHEEZING: 0
SORE THROAT: 0
NAUSEA: 0

## 2023-11-21 NOTE — PROGRESS NOTES
Camilo Martinez (:  1960) is a 70 y.o. male,Established patient, here for evaluation of the following chief complaint(s):  smoking cessation    ASSESSMENT/PLAN:    1. Dyspnea on exertion  -     albuterol sulfate HFA (PROVENTIL;VENTOLIN;PROAIR) 108 (90 Base) MCG/ACT inhaler; Inhale 2 puffs into the lungs 2 times daily as needed for Wheezing, Disp-1 each, R-1Normal  2. Tobacco abuse  -     nicotine (NICODERM CQ) 14 MG/24HR; Place 1 patch onto the skin every 24 hours, Disp-14 patch, R-4Normal  -     [address] Primary Care Pharmacist  -     nicotine polacrilex (NICORETTE) 2 MG gum; Take 1 each by mouth as needed for Smoking cessation, Disp-110 each, R-3Normal    For dyspnea on exertion patient was provided with PFT and echo to complete however did not complete. Also requesting albuterol refill. Otherwise does not complain of any chest pain. For patient's history of tobacco abuse and lung cancer, we will refill his nicotine patches and also provide Nicorette gums. Also refer to pharmacist for further assistance in smoke cessation. Return in about 3 months (around 2024), or if symptoms worsen or fail to improve, for Smoking cessation. Subjective     SUBJECTIVE/OBJECTIVE:    HPI    Mr. Camilo Martinez, 71 yo AA M, with previous medical history of DM Type II (now in pre-DM range), chronic low back pain, right lung adenocarcinoma s/p right upper lobectomy many years ago, prediabetes, and history of cocaine positive in urine. Presents to the [facility] family medicine clinic today for follow-up on smoking cessation. 2-3 cigarettes/day now. States that patches have significantly helped. Patient is also interested in Nicorette's as well. Patient used to smoke 5 to 6 cigarettes at our previous visit. Patient requesting albuterol refill and states that he continues to have dyspnea on exertion. Has yet to complete echocardiogram and PFT. Patient blood pressure is 110/62 and pulse 64.     Patient

## 2023-11-21 NOTE — PROGRESS NOTES
Visit Information    Have you changed or started any medications since your last visit including any over-the-counter medicines, vitamins, or herbal medicines? no   Are you having any side effects from any of your medications? -  no  Have you stopped taking any of your medications? Is so, why? -  no    Have you seen any other physician or provider since your last visit? No  Have you had any other diagnostic tests since your last visit? No  Have you been seen in the emergency room and/or had an admission to a hospital since we last saw you? No  Have you had your routine dental cleaning in the past 6 months? no    Have you activated your [employer] account? If not, what are your barriers?  No:      Patient Care Team:  Luana Ramirez MD as PCP - General (Family Medicine)  Darby Elizalde MD as Consulting Physician (Hematology and Oncology)    Medical History Review  Past Medical, Family, and Social History reviewed and does not contribute to the patient presenting condition    Health Maintenance   Topic Date Due    COVID-19 Vaccine (1) Never done    Depression Monitoring  Never done    Shingles vaccine (1 of 2) Never done    Pneumococcal 65+ years Vaccine (2 - PCV) 05/19/2018    Annual Wellness Visit (AWV)  Never done    Lipids  07/23/2021    AAA screen  Never done    A1C test (Diabetic or Prediabetic)  10/02/2024    Colorectal Cancer Screen  03/17/2025    DTaP/Tdap/Td vaccine (2 - Td or Tdap) 12/01/2026    Flu vaccine  Completed    Hepatitis C screen  Completed    HIV screen  Completed    Hepatitis A vaccine  Aged Out    Hepatitis B vaccine  Aged Out    Hib vaccine  Aged Out    Meningococcal (ACWY) vaccine  Aged Out    Pneumococcal 0-64 years Vaccine  Discontinued

## 2023-11-21 NOTE — PROGRESS NOTES
Attending Physician Statement  I have discussed the care of Titi Puga, including pertinent history and exam findings,  with the resident. I have reviewed the key elements of all parts of the encounter with the resident. I agree with the assessment, plan and orders as documented by the resident.   (Tia Regina)    Joshua Erickson MD

## 2023-11-21 NOTE — PATIENT INSTRUCTIONS
Thank you for letting us take care of you today. We hope all your questions were addressed. If a question was overlooked or something else comes to mind after you return home, please contact a member of your Care Team listed below. Your Care Team at [address] [place] [address] is Team #  Aleksandar Dietrich, (Faculty)  Marjorie Sheehan (Resident)  Jewels Chapin (Resident)  Pooja Maria (Resident)   Daryl Lassiter (Resident)  Adrián Alexander (Resident)  Milad Cartagena., [facility]  Misti Benavidez., [facility], [facility]  Patrick Mcintosh, [facility]  Pedro Do, [facility]  Nae Brock, [facility]  Alexandra Zamanal) [facility], [address] [place] [address] Road (Clinical Practice Manager)  Neftaly Scruggs, [facility] (Clinical Pharmacist)     Office phone number: [phone]    If you need to get in right away due to illness, please be advised we have \"Same Day\" appointments available Monday-Friday. Please call us at [phone] option #3 to schedule your \"Same Day\" appointment.

## 2024-02-09 DIAGNOSIS — R73.03 PREDIABETES: ICD-10-CM

## 2024-02-09 RX ORDER — METFORMIN HYDROCHLORIDE 500 MG/1
TABLET, EXTENDED RELEASE ORAL
Qty: 90 TABLET | Refills: 1 | Status: SHIPPED | OUTPATIENT
Start: 2024-02-09

## 2024-02-09 NOTE — TELEPHONE ENCOUNTER
Last visit: 11/21/2023  Last Med refill: 10/02/2023  Does patient have enough medication for 72 hours: no    Next Visit Date:  No future appointments.    Health Maintenance   Topic Date Due    COVID-19 Vaccine (1) Never done    Shingles vaccine (1 of 2) Never done    Respiratory Syncytial Virus (RSV) Pregnant or age 60 yrs+ (1 - 1-dose 60+ series) Never done    Pneumococcal 65+ years Vaccine (2 - PCV) 05/19/2018    Lipids  07/23/2021    AAA screen  Never done    Annual Wellness Visit (Medicare Advantage)  Never done    A1C test (Diabetic or Prediabetic)  10/02/2024    Depression Monitoring  11/21/2024    Colorectal Cancer Screen  03/17/2025    DTaP/Tdap/Td vaccine (2 - Td or Tdap) 12/01/2026    Flu vaccine  Completed    Hepatitis C screen  Completed    HIV screen  Completed    Hepatitis A vaccine  Aged Out    Hepatitis B vaccine  Aged Out    Hib vaccine  Aged Out    Polio vaccine  Aged Out    Meningococcal (ACWY) vaccine  Aged Out    Pneumococcal 0-64 years Vaccine  Discontinued       Hemoglobin A1C (%)   Date Value   10/02/2023 6.0   07/23/2020 5.9   07/11/2017 6.1             ( goal A1C is < 7)   No components found for: \"LABMICR\"  LDL Cholesterol (mg/dL)   Date Value   07/23/2020 105   12/19/2016 103       (goal LDL is <100)   AST (U/L)   Date Value   10/02/2023 20     ALT (U/L)   Date Value   10/02/2023 19     BUN (mg/dL)   Date Value   10/02/2023 13     BP Readings from Last 3 Encounters:   11/21/23 110/62   10/02/23 120/69   07/11/19 118/73          (goal 120/80)    All Future Testing planned in [facility]  Lab Frequency Next Occurrence   Full PFT Study With Bronchodilator Once 10/02/2023   Echo (TTE) complete (PRN contrast/bubble/strain/3D) Once 10/02/2023               Patient Active Problem List:     Cancer (HCC)     Chronic back pain     Lung cancer (HCC)     Displacement of lumbar intervertebral disc without myelopathy     Other symptoms referable to back     Marijuana abuse     Major depression     Tubular

## 2024-03-05 DIAGNOSIS — E55.9 VITAMIN D DEFICIENCY: ICD-10-CM

## 2024-03-05 RX ORDER — ERGOCALCIFEROL 1.25 MG/1
50000 CAPSULE ORAL WEEKLY
Qty: 12 CAPSULE | Refills: 2 | OUTPATIENT
Start: 2024-03-05

## 2024-03-05 NOTE — TELEPHONE ENCOUNTER
Last visit: 11/21/2023  Last Med refill: 10/03/2023  Does patient have enough medication for 72 hours: No:     Next Visit Date:  No future appointments.    Health Maintenance   Topic Date Due    COVID-19 Vaccine (1) Never done    Shingles vaccine (1 of 2) Never done    Respiratory Syncytial Virus (RSV) Pregnant or age 60 yrs+ (1 - 1-dose 60+ series) Never done    Pneumococcal 65+ years Vaccine (2 - PCV) 05/19/2018    Lipids  07/23/2021    AAA screen  Never done    Annual Wellness Visit (Medicare Advantage)  Never done    A1C test (Diabetic or Prediabetic)  10/02/2024    Depression Monitoring  11/21/2024    Colorectal Cancer Screen  03/17/2025    DTaP/Tdap/Td vaccine (2 - Td or Tdap) 12/01/2026    Flu vaccine  Completed    Hepatitis C screen  Completed    HIV screen  Completed    Hepatitis A vaccine  Aged Out    Hepatitis B vaccine  Aged Out    Hib vaccine  Aged Out    Polio vaccine  Aged Out    Meningococcal (ACWY) vaccine  Aged Out    Pneumococcal 0-64 years Vaccine  Discontinued       Hemoglobin A1C (%)   Date Value   10/02/2023 6.0   07/23/2020 5.9   07/11/2017 6.1             ( goal A1C is < 7)   No components found for: \"LABMICR\"  LDL Cholesterol (mg/dL)   Date Value   07/23/2020 105   12/19/2016 103       (goal LDL is <100)   AST (U/L)   Date Value   10/02/2023 20     ALT (U/L)   Date Value   10/02/2023 19     BUN (mg/dL)   Date Value   10/02/2023 13     BP Readings from Last 3 Encounters:   11/21/23 110/62   10/02/23 120/69   07/11/19 118/73          (goal 120/80)    All Future Testing planned in [facility]  Lab Frequency Next Occurrence   Full PFT Study With Bronchodilator Once 10/02/2023   Echo (TTE) complete (PRN contrast/bubble/strain/3D) Once 10/02/2023               Patient Active Problem List:     Cancer (HCC)     Chronic back pain     Lung cancer (HCC)     Displacement of lumbar intervertebral disc without myelopathy     Other symptoms referable to back     Marijuana abuse     Major depression

## 2024-03-05 NOTE — TELEPHONE ENCOUNTER
Called patient and informed him that the provider would like him to have labs done to check his Vitamin D levels.  Patient would like to have the order mailed. I put in mail today

## 2024-03-08 DIAGNOSIS — Z72.0 TOBACCO ABUSE: ICD-10-CM

## 2024-03-08 DIAGNOSIS — E55.9 VITAMIN D DEFICIENCY: ICD-10-CM

## 2024-03-08 RX ORDER — ERGOCALCIFEROL 1.25 MG/1
50000 CAPSULE ORAL WEEKLY
Qty: 12 CAPSULE | Refills: 1 | OUTPATIENT
Start: 2024-03-08

## 2024-03-13 ENCOUNTER — ENROLLMENT (OUTPATIENT)
Dept: PHARMACY | Facility: CLINIC | Age: 64
End: 2024-03-13

## 2024-04-02 DIAGNOSIS — E55.9 VITAMIN D DEFICIENCY: ICD-10-CM

## 2024-04-02 DIAGNOSIS — Z72.0 TOBACCO ABUSE: ICD-10-CM

## 2024-04-02 RX ORDER — NICOTINE 21 MG/24HR
PATCH, TRANSDERMAL 24 HOURS TRANSDERMAL
Qty: 28 PATCH | Refills: 0 | Status: SHIPPED | OUTPATIENT
Start: 2024-04-02 | End: 2024-04-29

## 2024-04-02 RX ORDER — ERGOCALCIFEROL 1.25 MG/1
50000 CAPSULE ORAL WEEKLY
Qty: 12 CAPSULE | Refills: 1 | OUTPATIENT
Start: 2024-04-02

## 2024-04-02 NOTE — TELEPHONE ENCOUNTER
Last visit: 11/21/23  Last Med refill: 10/3/23  Does patient have enough medication for 72 hours: No:     Next Visit Date:  No future appointments.    Health Maintenance   Topic Date Due    COVID-19 Vaccine (1) Never done    Shingles vaccine (1 of 2) Never done    Respiratory Syncytial Virus (RSV) Pregnant or age 60 yrs+ (1 - 1-dose 60+ series) Never done    Pneumococcal 65+ years Vaccine (2 of 2 - PCV) 05/19/2018    Lipids  07/23/2021    AAA screen  Never done    Annual Wellness Visit (Medicare Advantage)  Never done    A1C test (Diabetic or Prediabetic)  10/02/2024    Depression Monitoring  11/21/2024    Colorectal Cancer Screen  03/17/2025    DTaP/Tdap/Td vaccine (2 - Td or Tdap) 12/01/2026    Flu vaccine  Completed    Hepatitis C screen  Completed    HIV screen  Completed    Hepatitis A vaccine  Aged Out    Hepatitis B vaccine  Aged Out    Hib vaccine  Aged Out    Polio vaccine  Aged Out    Meningococcal (ACWY) vaccine  Aged Out    Pneumococcal 0-64 years Vaccine  Discontinued       Hemoglobin A1C (%)   Date Value   10/02/2023 6.0   07/23/2020 5.9   07/11/2017 6.1             ( goal A1C is < 7)   No components found for: \"LABMICR\"  LDL Cholesterol (mg/dL)   Date Value   07/23/2020 105   12/19/2016 103       (goal LDL is <100)   AST (U/L)   Date Value   10/02/2023 20     ALT (U/L)   Date Value   10/02/2023 19     BUN (mg/dL)   Date Value   10/02/2023 13     BP Readings from Last 3 Encounters:   11/21/23 110/62   10/02/23 120/69   07/11/19 118/73          (goal 120/80)    All Future Testing planned in [facility]  Lab Frequency Next Occurrence   Full PFT Study With Bronchodilator Once 10/02/2023   Echo (TTE) complete (PRN contrast/bubble/strain/3D) Once 10/02/2023   Vitamin D 25 Hydroxy Once 03/05/2024               Patient Active Problem List:     Cancer (HCC)     Chronic back pain     Lung cancer (HCC)     Displacement of lumbar intervertebral disc without myelopathy     Other symptoms referable to back

## 2024-04-26 ENCOUNTER — TELEPHONE (OUTPATIENT)
Dept: PHARMACY | Facility: CLINIC | Age: 64
End: 2024-04-26

## 2024-04-26 NOTE — TELEPHONE ENCOUNTER
Shirin Coe MD, from below, appointment with you 24 - identified with a care gap for diabetes without a statin  Per patient chart review:   Atorvastatin on medication list (but from 2017 and appears last filled @2019)  Metformin rx for prediabetes    Consider updated lipid panel to review, and if appropriate, please  use the following CMS eligible diagnosis within your visit encounter:  Prediabetes (R73.03)  - This will complete/close this insurer-identified gap for this calendar year.    Please let me know if I can further assist, or if you would like me to try to reach patient to discuss any further.  Thank you,  Nasreen Mars, PharmD, [facility]  Population Health Pharmacist  [facility] Clinical Pharmacy  Department, toll free: [phone], option 1    ==============================================================    [facility] CLINICAL PHARMACY: STATIN THERAPY REVIEW  Identified statin use in persons with diabetes care gap per [employer]. Records dated: 24.    ASSESSMENT  DIABETES ADHERENCE  *Associated Diagnoses: Prediabetes [R73.03]     Insurance Records claims through 24 (Prior Year PDC = not reported; YTD PDC = 100%; Potential Fail Date: 24):   METFORMIN    TAB 500MG ER last filled on 24 for 30 day supply. Next refill due: 24    Prescribed si tablet/capsule daily    Lab Results   Component Value Date    LABA1C 6.0 10/02/2023    LABA1C 5.9 2020    LABA1C 6.1 2017     STATIN GAP IDENTIFIED    Per chart review: patient may be excluded with appropriate CMS eligible dx code for SUPD:  Prediabetes (R73.03)    (Note: atorvastatin on current medication list, but from 2017; per reconcile dispense, last filled @2019)    Lab Results   Component Value Date    CHOL 181 2020    TRIG 126 2020    HDL 51 2020    LDLCHOLESTEROL 105 2020   *2021 lipid panel per [facility]    ALT   Date Value Ref Range Status   10/02/2023 19 5 - 41 U/L Final     AST

## 2024-04-26 NOTE — TELEPHONE ENCOUNTER
Hello. Will address concerns when patient presents for his appt on 4/29/2024 that is currently scheduled with me. Thank you

## 2024-04-29 DIAGNOSIS — Z72.0 TOBACCO ABUSE: ICD-10-CM

## 2024-04-29 RX ORDER — NICOTINE 21 MG/24HR
PATCH, TRANSDERMAL 24 HOURS TRANSDERMAL
Qty: 28 PATCH | Refills: 0 | Status: SHIPPED | OUTPATIENT
Start: 2024-04-29

## 2024-04-29 NOTE — TELEPHONE ENCOUNTER
Last visit: 11/21/23  Last Med refill: 4/2/24  Does patient have enough medication for 72 hours: no    Next Visit Date:  Future Appointments   Date Time Provider Department Center   5/3/2024 11:15 AM [id number]       Health Maintenance   Topic Date Due    COVID-19 Vaccine (1) Never done    Shingles vaccine (1 of 2) Never done    Respiratory Syncytial Virus (RSV) Pregnant or age 60 yrs+ (1 - 1-dose 60+ series) Never done    Pneumococcal 65+ years Vaccine (2 of 2 - PCV) 05/19/2018    Lipids  07/23/2021    AAA screen  Never done    Annual Wellness Visit (Medicare Advantage)  Never done    A1C test (Diabetic or Prediabetic)  10/02/2024    Depression Monitoring  11/21/2024    Colorectal Cancer Screen  03/17/2025    DTaP/Tdap/Td vaccine (2 - Td or Tdap) 12/01/2026    Flu vaccine  Completed    Hepatitis C screen  Completed    HIV screen  Completed    Hepatitis A vaccine  Aged Out    Hepatitis B vaccine  Aged Out    Hib vaccine  Aged Out    Polio vaccine  Aged Out    Meningococcal (ACWY) vaccine  Aged Out    Pneumococcal 0-64 years Vaccine  Discontinued       Hemoglobin A1C (%)   Date Value   10/02/2023 6.0   07/23/2020 5.9   07/11/2017 6.1             ( goal A1C is < 7)   No components found for: \"LABMICR\"  LDL Cholesterol (mg/dL)   Date Value   07/23/2020 105   12/19/2016 103       (goal LDL is <100)   AST (U/L)   Date Value   10/02/2023 20     ALT (U/L)   Date Value   10/02/2023 19     BUN (mg/dL)   Date Value   10/02/2023 13     BP Readings from Last 3 Encounters:   11/21/23 110/62   10/02/23 120/69   07/11/19 118/73          (goal 120/80)    All Future Testing planned in [facility]  Lab Frequency Next Occurrence   Full PFT Study With Bronchodilator Once 10/02/2023   Echo (TTE) complete (PRN contrast/bubble/strain/3D) Once 10/02/2023   Vitamin D 25 Hydroxy Once 04/02/2024               Patient Active Problem List:     Cancer (HCC)     Chronic back pain     Lung cancer (HCC)     Displacement of

## 2024-04-29 NOTE — TELEPHONE ENCOUNTER
Shirin Coe MD, from below, appointment with you 5/3/24 - identified with a care gap for diabetes without a statin  Per patient chart review:   Atorvastatin on medication list (but from 2017 and appears last filled @2019)  Metformin rx for prediabetes     Consider updated lipid panel to review, and if appropriate, please  use the following CMS eligible diagnosis within your visit encounter:  Prediabetes (R73.03)  - This will complete/close this insurer-identified gap for this calendar year.     Please let me know if I can further assist, or if you would like me to try to reach patient to discuss any further.  Thank you,  Nasreen Mars, PharmD, [facility]  Population Health Pharmacist  [facility] Clinical Pharmacy  Department, toll free: [phone], option 1     ===================================================  Noted appt rescheduled to 5/3/24.

## 2024-05-03 ENCOUNTER — OFFICE VISIT (OUTPATIENT)
Dept: FAMILY MEDICINE CLINIC | Age: 64
End: 2024-05-03
Payer: MEDICARE

## 2024-05-03 VITALS
DIASTOLIC BLOOD PRESSURE: 69 MMHG | WEIGHT: 199.6 LBS | TEMPERATURE: 97.6 F | BODY MASS INDEX: 27.06 KG/M2 | HEART RATE: 66 BPM | SYSTOLIC BLOOD PRESSURE: 116 MMHG

## 2024-05-03 DIAGNOSIS — R73.9 ELEVATED BLOOD SUGAR: ICD-10-CM

## 2024-05-03 DIAGNOSIS — Z13.220 SCREENING FOR HYPERLIPIDEMIA: ICD-10-CM

## 2024-05-03 DIAGNOSIS — D12.6 TUBULAR ADENOMA OF COLON: ICD-10-CM

## 2024-05-03 DIAGNOSIS — C34.91 MALIGNANT NEOPLASM OF RIGHT LUNG, UNSPECIFIED PART OF LUNG (HCC): ICD-10-CM

## 2024-05-03 DIAGNOSIS — Z72.0 TOBACCO ABUSE: ICD-10-CM

## 2024-05-03 DIAGNOSIS — E55.9 VITAMIN D DEFICIENCY: ICD-10-CM

## 2024-05-03 DIAGNOSIS — M51.26 DISPLACEMENT OF LUMBAR INTERVERTEBRAL DISC WITHOUT MYELOPATHY: Primary | Chronic | ICD-10-CM

## 2024-05-03 DIAGNOSIS — R06.09 DYSPNEA ON EXERTION: ICD-10-CM

## 2024-05-03 DIAGNOSIS — E78.2 MIXED HYPERLIPIDEMIA: ICD-10-CM

## 2024-05-03 PROBLEM — E78.5 HYPERLIPIDEMIA: Status: ACTIVE | Noted: 2024-05-03

## 2024-05-03 LAB — HBA1C MFR BLD: 6.2 %

## 2024-05-03 PROCEDURE — G8417 CALC BMI ABV UP PARAM F/U: HCPCS

## 2024-05-03 PROCEDURE — 3017F COLORECTAL CA SCREEN DOC REV: CPT

## 2024-05-03 PROCEDURE — G8427 DOCREV CUR MEDS BY ELIG CLIN: HCPCS

## 2024-05-03 PROCEDURE — 99213 OFFICE O/P EST LOW 20 MIN: CPT

## 2024-05-03 PROCEDURE — 1123F ACP DISCUSS/DSCN MKR DOCD: CPT

## 2024-05-03 PROCEDURE — 4004F PT TOBACCO SCREEN RCVD TLK: CPT

## 2024-05-03 RX ORDER — ATORVASTATIN CALCIUM 20 MG/1
20 TABLET, FILM COATED ORAL DAILY
Qty: 30 TABLET | Refills: 0 | Status: SHIPPED | OUTPATIENT
Start: 2024-05-03

## 2024-05-03 RX ORDER — MELATONIN
1 DAILY
Qty: 30 TABLET | Refills: 0 | Status: SHIPPED | OUTPATIENT
Start: 2024-05-03

## 2024-05-03 RX ORDER — ALBUTEROL SULFATE 90 UG/1
2 AEROSOL, METERED RESPIRATORY (INHALATION) 2 TIMES DAILY PRN
Qty: 1 EACH | Refills: 1 | Status: SHIPPED | OUTPATIENT
Start: 2024-05-03

## 2024-05-03 RX ORDER — IBUPROFEN 800 MG/1
800 TABLET ORAL 2 TIMES DAILY PRN
Qty: 60 TABLET | Refills: 0 | Status: SHIPPED | OUTPATIENT
Start: 2024-05-03

## 2024-05-03 ASSESSMENT — ENCOUNTER SYMPTOMS
BACK PAIN: 1
VOMITING: 0
NAUSEA: 0
DIARRHEA: 0
ABDOMINAL PAIN: 0
EYE PAIN: 0
COUGH: 0
SORE THROAT: 0
WHEEZING: 0
CONSTIPATION: 0
SHORTNESS OF BREATH: 0

## 2024-05-03 ASSESSMENT — PATIENT HEALTH QUESTIONNAIRE - PHQ9
5. POOR APPETITE OR OVEREATING: NOT AT ALL
SUM OF ALL RESPONSES TO PHQ QUESTIONS 1-9: 1
6. FEELING BAD ABOUT YOURSELF - OR THAT YOU ARE A FAILURE OR HAVE LET YOURSELF OR YOUR FAMILY DOWN: NOT AT ALL
SUM OF ALL RESPONSES TO PHQ9 QUESTIONS 1 & 2: 0
10. IF YOU CHECKED OFF ANY PROBLEMS, HOW DIFFICULT HAVE THESE PROBLEMS MADE IT FOR YOU TO DO YOUR WORK, TAKE CARE OF THINGS AT HOME, OR GET ALONG WITH OTHER PEOPLE: NOT DIFFICULT AT ALL
8. MOVING OR SPEAKING SO SLOWLY THAT OTHER PEOPLE COULD HAVE NOTICED. OR THE OPPOSITE, BEING SO FIGETY OR RESTLESS THAT YOU HAVE BEEN MOVING AROUND A LOT MORE THAN USUAL: NOT AT ALL
4. FEELING TIRED OR HAVING LITTLE ENERGY: SEVERAL DAYS
7. TROUBLE CONCENTRATING ON THINGS, SUCH AS READING THE NEWSPAPER OR WATCHING TELEVISION: NOT AT ALL
SUM OF ALL RESPONSES TO PHQ QUESTIONS 1-9: 1
9. THOUGHTS THAT YOU WOULD BE BETTER OFF DEAD, OR OF HURTING YOURSELF: NOT AT ALL
SUM OF ALL RESPONSES TO PHQ QUESTIONS 1-9: 1
3. TROUBLE FALLING OR STAYING ASLEEP: NOT AT ALL
SUM OF ALL RESPONSES TO PHQ QUESTIONS 1-9: 1
2. FEELING DOWN, DEPRESSED OR HOPELESS: NOT AT ALL
1. LITTLE INTEREST OR PLEASURE IN DOING THINGS: NOT AT ALL

## 2024-05-03 NOTE — PROGRESS NOTES
Attending Physician Statement  I  have discussed the care of Vic Nunez including pertinent history and exam findings with the resident. I agree with the assessment, plan and orders as documented by the resident.      /69 (Site: Left Upper Arm, Position: Sitting, Cuff Size: Medium Adult)   Pulse 66   Temp 97.6 °F (36.4 °C) (Oral)   Wt 90.5 kg (199 lb 9.6 oz)   BMI 27.06 kg/m²    BP Readings from Last 3 Encounters:   05/03/24 116/69   11/21/23 110/62   10/02/23 120/69     Wt Readings from Last 3 Encounters:   05/03/24 90.5 kg (199 lb 9.6 oz)   11/21/23 83.8 kg (184 lb 12.8 oz)   10/02/23 85.1 kg (187 lb 9.6 oz)          Diagnosis Orders   1. Displacement of lumbar intervertebral disc without myelopathy  [facility] Physical Therapy [facility]    Maggie Simons MD, Pain Management, [place]    ibuprofen (ADVIL;MOTRIN) 800 MG tablet      2. Malignant neoplasm of right lung, unspecified part of lung (HCC)  [facility]      3. Tubular adenoma of colon  Fernando Lovett MD, Gastroenterology, [facility]      4. Dyspnea on exertion  albuterol sulfate HFA (PROVENTIL;VENTOLIN;PROAIR) 108 (90 Base) MCG/ACT inhaler      5. Vitamin D deficiency  vitamin D3 (CHOLECALCIFEROL) 25 MCG (1000 UT) TABS tablet      6. Tobacco abuse  US ABDOMINAL AORTA LIMITED      7. Screening for hyperlipidemia  Lipid Panel              Hamzah Ramírez DO 5/3/2024 4:29 PM       less than 2 seconds

## 2024-05-03 NOTE — PROGRESS NOTES
Vic Nunez (:  1960) is a 63 y.o. male,Established patient, here for evaluation of the following chief complaint(s):    Check-Up (Patient states he is having body ache)    Assessment & Plan     1. Displacement of lumbar intervertebral disc without myelopathy  -     [facility] Physical Therapy - [facility]  -     [facility] Maggie Santos MD, Pain Management, [place]  -     ibuprofen (ADVIL;MOTRIN) 800 MG tablet; Take 1 tablet by mouth 2 times daily as needed for Pain, Disp-60 tablet, R-0Normal  2. Malignant neoplasm of right lung, unspecified part of lung (HCC)  -     [facility]  3. Tubular adenoma of colon  -     [facility] Fernando Lau MD, Gastroenterology, [facility]  4. Dyspnea on exertion  -     albuterol sulfate HFA (PROVENTIL;VENTOLIN;PROAIR) 108 (90 Base) MCG/ACT inhaler; Inhale 2 puffs into the lungs 2 times daily as needed for Wheezing, Disp-1 each, R-1Normal  5. Vitamin D deficiency  -     vitamin D3 (CHOLECALCIFEROL) 25 MCG (1000 UT) TABS tablet; Take 1 tablet by mouth daily, Disp-30 tablet, R-0Normal  6. Tobacco abuse  -     US ABDOMINAL AORTA LIMITED; Future  7. Screening for hyperlipidemia  -     Lipid Panel; Future    Return in about 6 weeks (around 2024).       Subjective     HPI    Mr. Vic Nunez, 63-year-old male, with previous medical history of DM Type II (now in pre-DM range), chronic low back pain, right lung adenocarcinoma s/p right upper lobectomy many years ago, prediabetes, and history of cocaine positive in urine.    Presents to the [facility]'s office today for follow-up    Patient's blood pressure is good today.  116/69.  Pulse 66.    Patient A1c is well-controlled.  6.2.  Patient is compliant with his medication metformin 500 extended release daily.    On further questioning patient does state he is still occasionally gets short of breath.  Patient is here to complete PFT and echocardiogram.    Patient also complains of chronic back pain.  Patient did

## 2024-05-03 NOTE — PROGRESS NOTES
Visit Information    Have you changed or started any medications since your last visit including any over-the-counter medicines, vitamins, or herbal medicines? no   Have you stopped taking any of your medications? Is so, why? -  no  Are you having any side effects from any of your medications? - no    Have you seen any other physician or provider since your last visit?  no   Have you had any other diagnostic tests since your last visit?  no   Have you been seen in the emergency room and/or had an admission in a hospital since we last saw you?  no   Have you had your routine dental cleaning in the past 6 months?  no     Do you have an active MyChart account? If no, what is the barrier?  Yes    Patient Care Team:  Shirin Coe MD as PCP - General (Family Medicine)  Tamiko Morocho MD as PCP - Empaneled Provider  Henri Oliveira MD as Consulting Physician (Hematology and Oncology)    Medical History Review  Past Medical, Family, and Social History reviewed and does not contribute to the patient presenting condition    Health Maintenance   Topic Date Due    Shingles vaccine (1 of 2) Never done    Respiratory Syncytial Virus (RSV) Pregnant or age 60 yrs+ (1 - 1-dose 60+ series) Never done    Pneumococcal 65+ years Vaccine (2 of 2 - PCV) 05/19/2018    Lipids  07/23/2021    AAA screen  Never done    Annual Wellness Visit (Medicare Advantage)  Never done    A1C test (Diabetic or Prediabetic)  10/02/2024    Depression Monitoring  11/21/2024    Colorectal Cancer Screen  03/17/2025    DTaP/Tdap/Td vaccine (2 - Td or Tdap) 12/01/2026    Flu vaccine  Completed    COVID-19 Vaccine  Completed    Hepatitis C screen  Completed    HIV screen  Completed    Hepatitis A vaccine  Aged Out    Hepatitis B vaccine  Aged Out    Hib vaccine  Aged Out    Polio vaccine  Aged Out    Meningococcal (ACWY) vaccine  Aged Out    Pneumococcal 0-64 years Vaccine  Discontinued

## 2024-05-03 NOTE — PATIENT INSTRUCTIONS
Thank you for letting us take care of you today. We hope all your questions were addressed. If a question was overlooked or something else comes to mind after you return home, please contact a member of your Care Team listed below.      Your Care Team at [facility] is Team #  Hamzah Ramírez, (Faculty)  Hilda Keith (Resident)  Hilda Santana (Resident)   Rob Mejia (Resident)  Shirin Coe (Resident)  Ryann Moran., [facility]  Analilia Franklin., [facility]  Bethanie Fagan, [facility]  Tawanna Espinosa, [facility]  Alton Pineda, [facility]  Muriel Robertson, [facility]  Evon Velez, [facility]  Nesha Nunez, ADDISON Morris (LJ) LEONEL Langston (Clinical Practice Manager)  Ignacia Uriostegui [facility] (Clinical Pharmacist)     Office phone number: [phone]    If you need to get in right away due to illness, please be advised we have \"Same Day\" appointments available Monday-Friday. Please call us at [phone] option #3 to schedule your \"Same Day\" appointment.

## 2024-05-06 NOTE — TELEPHONE ENCOUNTER
Noted it appears atorvastatin rx with 5/3/24 OV    =======================================================   For Pharmacy Admin Tracking Only    Program: [facility]  CPA in place:  No  Recommendation Provided To: Provider: 1 via Note to Provider  Intervention Detail: Refill(s) Provided  Intervention Accepted By: Provider: 1  Gap Closed?: No   Time Spent (min): 15

## 2024-05-07 ENCOUNTER — TELEPHONE (OUTPATIENT)
Dept: PAIN MANAGEMENT | Age: 64
End: 2024-05-07

## 2024-05-07 ENCOUNTER — TELEPHONE (OUTPATIENT)
Dept: GASTROENTEROLOGY | Age: 64
End: 2024-05-07

## 2024-05-07 NOTE — TELEPHONE ENCOUNTER
Patient referred by Dr. Coe to see Dr. Escoto, no available openings until August, patient would like a sooner appointment, please call patient at [phone] [facility]/sc

## 2024-05-07 NOTE — TELEPHONE ENCOUNTER
Patient has referral to see pain management and needs to schedule unable to reach office please call patient at [phone] [facility]/sc

## 2024-05-08 RX ORDER — SODIUM, POTASSIUM,MAG SULFATES 17.5-3.13G
SOLUTION, RECONSTITUTED, ORAL ORAL
Qty: 1 EACH | Refills: 0 | Status: SHIPPED | OUTPATIENT
Start: 2024-05-08

## 2024-05-08 RX ORDER — BISACODYL 5 MG/1
TABLET, DELAYED RELEASE ORAL
Qty: 4 TABLET | Refills: 0 | Status: SHIPPED | OUTPATIENT
Start: 2024-05-08

## 2024-05-08 NOTE — TELEPHONE ENCOUNTER
Procedure scheduled/Dr Escoto  Procedure:Colonoscopy  Dx:Tubular adenoma of colon   Date:06/28/2024   Time:1:30 pm / Arrive: 12:00 am  Hospital:[facility] phone call:[place]  Bowel Prep instructions given:Suprep  In office/via phone:phone   Clearance needed:none

## 2024-05-15 ENCOUNTER — HOSPITAL ENCOUNTER (OUTPATIENT)
Dept: PHYSICAL THERAPY | Age: 64
Setting detail: THERAPIES SERIES
Discharge: HOME | End: 2024-05-15
Payer: MEDICARE

## 2024-05-15 PROCEDURE — 97161 PT EVAL LOW COMPLEX 20 MIN: CPT

## 2024-05-15 PROCEDURE — 97110 THERAPEUTIC EXERCISES: CPT
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
## 2024-05-21 ENCOUNTER — HOSPITAL ENCOUNTER (OUTPATIENT)
Dept: PHYSICAL THERAPY | Age: 64
Setting detail: THERAPIES SERIES
Discharge: HOME | End: 2024-05-21
Payer: MEDICARE

## 2024-05-21 PROCEDURE — 97110 THERAPEUTIC EXERCISES: CPT

## 2024-05-21 NOTE — FLOWSHEET NOTE
[] [facility]  Outpatient Rehabilitation &  Therapy  [address]  [phone]  [phone] [] [facility]  Outpatient Rehabilitation &  Therapy  [address]  P: [phone]  F: [phone] [] [facility]  Outpatient Rehabilitation &  Therapy  [address]  P: [phone]  F: [phone] [] [facility]  Outpatient Rehabilitation &  Therapy  [address]  [phone]  [phone] [] [facility]  Outpatient Rehabilitation &  Therapy  [address]   P: [phone]  F: [phone]  [] [facility]  Outpatient Rehabilitation &  Therapy  [address]  P: [phone]  F: [phone] [] [facility]  Outpatient Rehabilitation &  Therapy  [address]  P: [phone]  F: [phone] [] [facility]  Outpatient Rehabilitation &  Therapy  [address]  P: [phone]  F: [phone] [] [facility]  Outpatient Rehabilitation &  Therapy  [address]  P: [phone]  F: [phone]  [] [facility] Outpatient Rehabilitation &  Therapy  [address]  P: [phone]  F: [phone]     Physical Therapy Daily Treatment Note    Date:  2024  Patient Name:  Vic Nunez    :  1960  MRN: [id number]  Physician: Saravanan Carpio MD        Insurance: [facility] MEDICARE, [place], [facility]  Medical Diagnosis: Displacement of lumbar intervertebral disc without myelopathy (M51.26)           Rehab Codes: M54.5, M54.2, M25.511, M25.512, M79.605  Onset Date: 5/3/24                                    Next Dr.'s appt: Pain Man. 24    Visit# / total visits: ;    Cancels/No Shows: 0/0    Subjective:    Pain:  [x] Yes  [] No Location: Low back  Pain Rating: (0-10 scale) 7/10  Pain altered Tx:  [] No  [] Yes

## 2024-05-23 ENCOUNTER — HOSPITAL ENCOUNTER (OUTPATIENT)
Dept: PHYSICAL THERAPY | Age: 64
Setting detail: THERAPIES SERIES
Discharge: HOME | End: 2024-05-23
Payer: MEDICARE

## 2024-05-23 NOTE — FLOWSHEET NOTE
[x] [facility]  Outpatient Rehabilitation &  Therapy  [address]  [phone]  [phone] [] [facility]  Outpatient Rehabilitation &  Therapy  [address]  P: [phone]  F: [phone] [] [facility]  Outpatient Rehabilitation &  Therapy  [address]  P: [phone]  F: [phone] [] [facility]  Outpatient Rehabilitation &  Therapy  [address]  [phone]  [phone] [] [facility]  Outpatient Rehabilitation &  Therapy  [address]   P: [phone]  F: [phone]  [] [facility]  Outpatient Rehabilitation &  Therapy  [address]  P: [phone]  F: [phone] [] [facility]  Outpatient Rehabilitation &  Therapy  [address]  P: [phone]  F: [phone] [] [facility]  Outpatient Rehabilitation &  Therapy  [address]  P: [phone]  F: [phone] [] [facility]  Outpatient Rehabilitation &  Therapy  [address]  P: [phone]  F: [phone]  [] [facility] Outpatient Rehabilitation &  Therapy  [address]  P: [phone]  F: [phone]     Therapy Cancel/No Show note    Date: 2024  Patient: Vic Nunez  : 1960  MRN: [id number]    Cancels/No Shows to date:     For today's appointment patient:    []  Cancelled    [] Rescheduled appointment    [x] No-show     Reason given by patient:    []  Patient ill    []  Conflicting appointment    [] No transportation      [] Conflict with work    [] No reason given    [] Weather related    [] COVID-19    [] Other:      Comments:        [] Next appointment was confirmed    Electronically signed by: Edmond Lowe PT

## 2024-05-24 DIAGNOSIS — Z13.220 SCREENING FOR HYPERLIPIDEMIA: ICD-10-CM

## 2024-05-24 DIAGNOSIS — E78.2 MIXED HYPERLIPIDEMIA: ICD-10-CM

## 2024-05-24 RX ORDER — ATORVASTATIN CALCIUM 20 MG/1
20 TABLET, FILM COATED ORAL DAILY
Qty: 100 TABLET | Refills: 0 | Status: SHIPPED | OUTPATIENT
Start: 2024-05-24

## 2024-05-24 NOTE — TELEPHONE ENCOUNTER
[facility] CLINICAL PHARMACY: STATIN THERAPY REVIEW  Identified statin use in persons with diabetes care gap per [employer]. Records dated: 24.    ASSESSMENT  DIABETES ADHERENCE    Insurance Records claims through 24 (Prior Year PDC = not reported; YTD PDC = 100%; Potential Fail Date: 24):   METFORMIN    TAB 500MG ER last filled on 24 for 30 day supply. Next refill due: 24    Prescribed si tablet/capsule daily    Per Reconcile Dispense History: 1 refill remains    Lab Results   Component Value Date    LABA1C 6.2 2024    LABA1C 6.0 10/02/2023    LABA1C 5.9 2020     STATIN GAP IDENTIFIED    Per chart review: patient is prescribed atorvastatin 20mg daily  NEW rx 5/3/24  Per Insurer Portal: 30ds claim 5/3/24 at [facility] pharmacy reversed    Lab Results   Component Value Date    CHOL 181 2020    TRIG 126 2020    HDL 51 2020     Lab Results   Component Value Date     2020      ALT   Date Value Ref Range Status   10/02/2023 19 5 - 41 U/L Final     AST   Date Value Ref Range Status   10/02/2023 20 <40 U/L Final     PLAN  Per insurer report, LIS-2 - may be able to receive up to a 100-day supply for the same cost as a 30-day supply.    The following are interventions that have been identified:   Adherence: Patient eligible for 100 day supply of rxs (LIS2; appears metformin rx written for 90ds & pharmacy filling 30ds)  Statin Gap (Diabetes): confirm if patient is starting atorvastatin (new rx at 5/3/24 appt), need rx to [facility], instead of [facility]?    Reached patient to review. Agreeable to starting, but does need rx to [facility] - states they deliver monthly/as needed to him.    Last Visit: 5/3/24  Next Visit: 24    Nasreen Mars, PharmD, BCACP  Population Health Pharmacist  [facility] Clinical Pharmacy  Department, toll free: [phone], option 1

## 2024-05-24 NOTE — TELEPHONE ENCOUNTER
Shirin Coe MD, patient needs rx redirected to [facility] pharmacy (they deliver to him); also eligible for up to 100-day supply, if appropriate. Rx(s) pended for your signature/modification as appropriate    Last Visit: 5/3/24  Next Visit: 5/31/24    Thank you,  Nasreen Mars, PharmD, [facility]  Population Health Pharmacist  [facility] Clinical Pharmacy  Department, toll free: [phone], option 1

## 2024-05-28 ENCOUNTER — HOSPITAL ENCOUNTER (OUTPATIENT)
Dept: PHYSICAL THERAPY | Age: 64
Setting detail: THERAPIES SERIES
Discharge: HOME | End: 2024-05-28
Payer: MEDICARE

## 2024-05-28 PROCEDURE — 97110 THERAPEUTIC EXERCISES: CPT

## 2024-05-28 NOTE — FLOWSHEET NOTE
[x] [facility]  Outpatient Rehabilitation &  Therapy  [address]  [phone]  [phone] [] [facility]  Outpatient Rehabilitation &  Therapy  [address]  P: [phone]  F: [phone] [] [facility]  Outpatient Rehabilitation &  Therapy  [address]  P: [phone]  F: [phone] [] [facility]  Outpatient Rehabilitation &  Therapy  [address]  [phone]  [phone] [] [facility]  Outpatient Rehabilitation &  Therapy  [address]   P: [phone]  F: [phone]  [] [facility]  Outpatient Rehabilitation &  Therapy  [address]  P: [phone]  F: [phone] [] [facility]  Outpatient Rehabilitation &  Therapy  [address]  P: [phone]  F: [phone] [] [facility]  Outpatient Rehabilitation &  Therapy  [address]  P: [phone]  F: [phone] [] [facility]  Outpatient Rehabilitation &  Therapy  [address]  P: [phone]  F: [phone]  [] [facility] Outpatient Rehabilitation &  Therapy  [address]  P: [phone]  F: [phone]     Physical Therapy Daily Treatment Note    Date:  2024  Patient Name:  Vic Nunez    :  1960  MRN: [id number]  Physician: Saravanan Carpio MD        Insurance: [facility] MEDICARE, [place], [facility]  Medical Diagnosis: Displacement of lumbar intervertebral disc without myelopathy (M51.26)           Rehab Codes: M54.5, M54.2, M25.511, M25.512, M79.605  Onset Date: 5/3/24                                    Next 's appt: Pain Man. 24    Visit# / total visits: 3/12;    Cancels/No Shows: 0/1    Subjective:    Pain:  [x] Yes  [] No Location: Low back  Pain Rating: (0-10 scale) 710  Pain altered Tx:  [] No  [] Yes

## 2024-05-30 ENCOUNTER — HOSPITAL ENCOUNTER (OUTPATIENT)
Dept: PHYSICAL THERAPY | Age: 64
Setting detail: THERAPIES SERIES
Discharge: HOME | End: 2024-05-30
Payer: MEDICARE

## 2024-05-30 NOTE — FLOWSHEET NOTE
[x] [facility]  Outpatient Rehabilitation &  Therapy  [address]  [phone]  [phone] [] [facility]  Outpatient Rehabilitation &  Therapy  [address]  P: [phone]  F: [phone] [] [facility]  Outpatient Rehabilitation &  Therapy  [address]  P: [phone]  F: [phone] [] [facility]  Outpatient Rehabilitation &  Therapy  [address]  [phone]  [phone] [] [facility]  Outpatient Rehabilitation &  Therapy  [address]   P: [phone]  F: [phone]  [] [facility]  Outpatient Rehabilitation &  Therapy  [address]  P: [phone]  F: [phone] [] [facility]  Outpatient Rehabilitation &  Therapy  [address]  P: [phone]  F: [phone] [] [facility]  Outpatient Rehabilitation &  Therapy  [address]  P: [phone]  F: [phone] [] [facility]  Outpatient Rehabilitation &  Therapy  [address]  P: [phone]  F: [phone]  [] [facility] Outpatient Rehabilitation &  Therapy  [address]  P: [phone]  F: [phone]     Therapy Cancel/No Show note    Date: 2024  Patient: Vic Nunez  : 1960  MRN: [id number]    Cancels/No Shows to date: 02    For today's appointment patient:    []  Cancelled    [] Rescheduled appointment    [x] No-show     Reason given by patient:    []  Patient ill    []  Conflicting appointment    [] No transportation      [] Conflict with work    [] No reason given    [] Weather related    [] COVID-19    [] Other:      Comments:  Patient called and LVM requesting a call back.  attempted and left Mr. Nunez a voicemail. He did not show for his appointment.       [] Next appointment was confirmed    Electronically

## 2024-05-31 ENCOUNTER — OFFICE VISIT (OUTPATIENT)
Dept: FAMILY MEDICINE CLINIC | Age: 64
End: 2024-05-31
Payer: MEDICARE

## 2024-05-31 VITALS
HEART RATE: 78 BPM | WEIGHT: 189.2 LBS | BODY MASS INDEX: 25.63 KG/M2 | DIASTOLIC BLOOD PRESSURE: 77 MMHG | SYSTOLIC BLOOD PRESSURE: 117 MMHG | HEIGHT: 72 IN

## 2024-05-31 DIAGNOSIS — R73.03 PREDIABETES: ICD-10-CM

## 2024-05-31 DIAGNOSIS — R06.09 DYSPNEA ON EXERTION: ICD-10-CM

## 2024-05-31 DIAGNOSIS — Z00.00 WELCOME TO MEDICARE PREVENTIVE VISIT: Primary | ICD-10-CM

## 2024-05-31 DIAGNOSIS — Z23 NEED FOR PNEUMOCOCCAL VACCINATION: ICD-10-CM

## 2024-05-31 DIAGNOSIS — E55.9 VITAMIN D DEFICIENCY: ICD-10-CM

## 2024-05-31 PROCEDURE — 90677 PCV20 VACCINE IM: CPT | Performed by: STUDENT IN AN ORGANIZED HEALTH CARE EDUCATION/TRAINING PROGRAM

## 2024-05-31 RX ORDER — ALBUTEROL SULFATE 90 UG/1
2 AEROSOL, METERED RESPIRATORY (INHALATION) 2 TIMES DAILY PRN
Qty: 1 EACH | Refills: 0 | Status: SHIPPED | OUTPATIENT
Start: 2024-05-31

## 2024-05-31 RX ORDER — ALBUTEROL SULFATE 90 UG/1
2 AEROSOL, METERED RESPIRATORY (INHALATION) 2 TIMES DAILY PRN
Qty: 1 EACH | Refills: 1 | Status: CANCELLED | OUTPATIENT
Start: 2024-05-31

## 2024-05-31 RX ORDER — METFORMIN HYDROCHLORIDE 500 MG/1
500 TABLET, EXTENDED RELEASE ORAL
Qty: 90 TABLET | Refills: 0 | Status: SHIPPED | OUTPATIENT
Start: 2024-05-31

## 2024-05-31 RX ORDER — MELATONIN
1 DAILY
Qty: 90 TABLET | Refills: 0 | Status: SHIPPED | OUTPATIENT
Start: 2024-05-31

## 2024-05-31 ASSESSMENT — PATIENT HEALTH QUESTIONNAIRE - PHQ9
1. LITTLE INTEREST OR PLEASURE IN DOING THINGS: SEVERAL DAYS
3. TROUBLE FALLING OR STAYING ASLEEP: NEARLY EVERY DAY
4. FEELING TIRED OR HAVING LITTLE ENERGY: NEARLY EVERY DAY
7. TROUBLE CONCENTRATING ON THINGS, SUCH AS READING THE NEWSPAPER OR WATCHING TELEVISION: NOT AT ALL
SUM OF ALL RESPONSES TO PHQ QUESTIONS 1-9: 9
6. FEELING BAD ABOUT YOURSELF - OR THAT YOU ARE A FAILURE OR HAVE LET YOURSELF OR YOUR FAMILY DOWN: NOT AT ALL
SUM OF ALL RESPONSES TO PHQ9 QUESTIONS 1 & 2: 2
8. MOVING OR SPEAKING SO SLOWLY THAT OTHER PEOPLE COULD HAVE NOTICED. OR THE OPPOSITE, BEING SO FIGETY OR RESTLESS THAT YOU HAVE BEEN MOVING AROUND A LOT MORE THAN USUAL: SEVERAL DAYS
10. IF YOU CHECKED OFF ANY PROBLEMS, HOW DIFFICULT HAVE THESE PROBLEMS MADE IT FOR YOU TO DO YOUR WORK, TAKE CARE OF THINGS AT HOME, OR GET ALONG WITH OTHER PEOPLE: SOMEWHAT DIFFICULT
9. THOUGHTS THAT YOU WOULD BE BETTER OFF DEAD, OR OF HURTING YOURSELF: NOT AT ALL
5. POOR APPETITE OR OVEREATING: NOT AT ALL
SUM OF ALL RESPONSES TO PHQ QUESTIONS 1-9: 9
2. FEELING DOWN, DEPRESSED OR HOPELESS: SEVERAL DAYS

## 2024-05-31 ASSESSMENT — LIFESTYLE VARIABLES
HOW MANY STANDARD DRINKS CONTAINING ALCOHOL DO YOU HAVE ON A TYPICAL DAY: PATIENT DOES NOT DRINK
HOW OFTEN DO YOU HAVE A DRINK CONTAINING ALCOHOL: MONTHLY OR LESS

## 2024-05-31 NOTE — PATIENT INSTRUCTIONS
14.0  © 2006-2024 [employer].   Care instructions adapted under license by [employer]. If you have questions about a medical condition or this instruction, always ask your healthcare professional. [employer] disclaims any warranty or liability for your use of this information.      Personalized Preventive Plan for Vic Nunez - 5/31/2024  Medicare offers a range of preventive health benefits. Some of the tests and screenings are paid in full while other may be subject to a deductible, co-insurance, and/or copay.    Some of these benefits include a comprehensive review of your medical history including lifestyle, illnesses that may run in your family, and various assessments and screenings as appropriate.    After reviewing your medical record and screening and assessments performed today your provider may have ordered immunizations, labs, imaging, and/or referrals for you.  A list of these orders (if applicable) as well as your Preventive Care list are included within your After Visit Summary for your review.    Other Preventive Recommendations:    A preventive eye exam performed by an eye specialist is recommended every 1-2 years to screen for glaucoma; cataracts, macular degeneration, and other eye disorders.  A preventive dental visit is recommended every 6 months.  Try to get at least 150 minutes of exercise per week or 10,000 steps per day on a pedometer .  Order or download the FREE \"Exercise & Physical Activity: Your Everyday Guide\" from [employer] [place] [employer]. Call 1-443.726.3975 or search [employer] [place] [employer] online.  You need 3032-7314 mg of calcium and 8129-6273 IU of vitamin D per day. It is possible to meet your calcium requirement with diet alone, but a vitamin D supplement is usually necessary to meet this goal.  When exposed to the sun, use a sunscreen that protects against both UVA and UVB radiation with an SPF of 30 or greater. Reapply every 2 to 3  Patient

## 2024-05-31 NOTE — PROGRESS NOTES
Attending Physician Statement  I  have discussed the care of Vic Nunez including pertinent history and exam findings with the resident. I agree with the assessment, plan and orders as documented by the resident.      /77 (Site: Left Upper Arm, Position: Sitting, Cuff Size: Medium Adult) Comment: machine  Pulse 78   Ht 1.829 m (6' 0.01\")   Wt 85.8 kg (189 lb 3.2 oz)   BMI 25.65 kg/m²    BP Readings from Last 3 Encounters:   05/31/24 117/77   05/03/24 116/69   11/21/23 110/62     Wt Readings from Last 3 Encounters:   05/31/24 85.8 kg (189 lb 3.2 oz)   05/03/24 90.5 kg (199 lb 9.6 oz)   11/21/23 83.8 kg (184 lb 12.8 oz)          Diagnosis Orders   1. Welcome to Medicare preventive visit  [facility] Referral to [facility] Clinical Specialist      2. Prediabetes  metFORMIN (GLUCOPHAGE-XR) 500 MG extended release tablet      3. Dyspnea on exertion  albuterol sulfate HFA (PROVENTIL;VENTOLIN;PROAIR) 108 (90 Base) MCG/ACT inhaler      4. Vitamin D deficiency  vitamin D3 (CHOLECALCIFEROL) 25 MCG (1000 UT) TABS tablet    Vitamin D 25 Hydroxy      5. Need for pneumococcal vaccination  Pneumococcal, PCV20, PREVNAR 20, (age 6w+), IM, PF              Aba Parra MD 5/31/2024 3:35 PM      
Visit Information    Have you changed or started any medications since your last visit including any over-the-counter medicines, vitamins, or herbal medicines? no   Have you stopped taking any of your medications? Is so, why? -  no  Are you having any side effects from any of your medications? - no    Have you seen any other physician or provider since your last visit?  yes - PT   Have you had any other diagnostic tests since your last visit?  no   Have you been seen in the emergency room and/or had an admission in a hospital since we last saw you?  no   Have you had your routine dental cleaning in the past 6 months?  no     Do you have an active MyChart account? If no, what is the barrier?  Yes    Patient Care Team:  Shirin Coe MD as PCP - General (Family Medicine)  Tamiko Morocho MD as PCP - Empaneled Provider  Henri Oliveira MD as Consulting Physician (Hematology and Oncology)    Medical History Review  Past Medical, Family, and Social History reviewed and does not contribute to the patient presenting condition    Health Maintenance   Topic Date Due    Shingles vaccine (1 of 2) Never done    Respiratory Syncytial Virus (RSV) Pregnant or age 60 yrs+ (1 - 1-dose 60+ series) Never done    Pneumococcal 65+ years Vaccine (2 of 2 - PCV) 05/19/2018    Lipids  07/23/2021    AAA screen  Never done    Annual Wellness Visit (Medicare Advantage)  Never done    Colorectal Cancer Screen  03/17/2025    A1C test (Diabetic or Prediabetic)  05/03/2025    Depression Monitoring  05/03/2025    DTaP/Tdap/Td vaccine (2 - Td or Tdap) 12/01/2026    Flu vaccine  Completed    COVID-19 Vaccine  Completed    Hepatitis C screen  Completed    Hepatitis A vaccine  Aged Out    Hepatitis B vaccine  Aged Out    Hib vaccine  Aged Out    Polio vaccine  Aged Out    Meningococcal (ACWY) vaccine  Aged Out    Pneumococcal 0-64 years Vaccine  Discontinued    HIV screen  Discontinued             
Authorizing Provider   vitamin D3 (CHOLECALCIFEROL) 25 MCG (1000 UT) TABS tablet Take 1 tablet by mouth daily Yes Shirin Coe MD   metFORMIN (GLUCOPHAGE-XR) 500 MG extended release tablet Take 1 tablet by mouth daily (with breakfast) Yes Shirin Coe MD   albuterol sulfate HFA (PROVENTIL;VENTOLIN;PROAIR) 108 (90 Base) MCG/ACT inhaler Inhale 2 puffs into the lungs 2 times daily as needed for Wheezing Yes Shirin Coe MD   atorvastatin (LIPITOR) 20 MG tablet Take 1 tablet by mouth daily Yes Shirin Coe MD   sodium-potassium-mag sulfate (SUPREP) 17.5-3.13-1.6 GM/177ML SOLN solution Take as directed by your doctor for bowel prep Yes Fernando Escoto MD   bisacodyl 5 MG EC tablet Take as directed by physician office for bowel prep Yes Fernando Escoto MD   ibuprofen (ADVIL;MOTRIN) 800 MG tablet Take 1 tablet by mouth 2 times daily as needed for Pain Yes Shirin Coe MD   nicotine (NICODERM CQ) 14 MG/24HR APPLY 1 PATCH ONTO SKIN ONCE DAILY. **REMOVE OLD PATCH BEFORE APPLYING NEW ONE**  Patient not taking: Reported on 5/3/2024  Shirin Coe MD   nicotine polacrilex (NICORETTE) 2 MG gum Take 1 each by mouth as needed for Smoking cessation  Patient not taking: Reported on 5/3/2024  Shirin Coe MD   vitamin D (ERGOCALCIFEROL) 1.25 MG (77837 UT) CAPS capsule Take 1 capsule by mouth once a week  Patient not taking: Reported on 5/31/2024  Shirin Coe MD       CareTeam (Including outside providers/suppliers regularly involved in providing care):   Patient Care Team:  Shirin Coe MD as PCP - General (Family Medicine)  Tamiko Morocho MD as PCP - Empaneled Provider  Henri Oliveira MD as Consulting Physician (Hematology and Oncology)     Reviewed and updated this visit:  Tobacco  Allergies  Meds  Problems  Med Hx  Surg Hx  Soc Hx  Fam Hx

## 2024-06-03 ENCOUNTER — CLINICAL DOCUMENTATION (OUTPATIENT)
Dept: SPIRITUAL SERVICES | Age: 64
End: 2024-06-03

## 2024-06-03 NOTE — ACP (ADVANCE CARE PLANNING)
Advance Care Planning   Ambulatory ACP Specialist Patient Outreach    Date:  6/3/2024    ACP Specialist:  JAGDISH Joseph    Outreach call to patient in follow-up to ACP Specialist referral from:Shirin Coe MD    [x] PCP  [] Provider   [] Ambulatory Care Management [] Other     For:                  [x] Advance Directive Assistance              [] Complete Portable DNR order              [] Complete POST/POLST/MOST              [x] Code Status Discussion             [x] Discuss Goals of Care             [] Early ACP Decision-Making              [x] Other (Specify): living will discussion    Date Referral Received:05/31/2024    Next Step:   [] ACP scheduled conversation  [] Outreach again in one week               [x] Email / Mail ACP Info Sheets  [x] Email / Mail Advance Directive   [x] Closing referral.  Routing closure to referring provider/staff and to ACP Specialist .    [] Closure letter mailed to patient with invitation to contact ACP Specialist if / when ready.   [] Other (Specify here):         [x] At this time, Healthcare Decision Maker Is:        [] Primary agent named in scanned advance directive.    [x] Legal Next of Kin.     [] Unable to determine legal decision maker at this time.    Advance Care Planning   Healthcare Decision Maker:    Primary Decision Maker: Elvira Harman - Carlos - [phone]            Outreaches:       [x] 1st -  Date:  06/03/2024               Intervention:  [x] Spoke with Patient   [] Left Voice mail [x] Email / Mail    [] [employer]  [] Other (Specify) :     Outcomes:ACP Specialist Referral received. Placed call to home/preferred and pt answered. Introduced SW and reason for call. Briefly explained ACP; pt stated \"is it gonna do me any good?\" Offered additional, albeit brief, education on ACP. Pt declined any other ACP support at this time. He did state \"you can mail me some stuff.\" Confirmed mailing address and encouraged pt to please reach out to this SW if

## 2024-06-04 ENCOUNTER — HOSPITAL ENCOUNTER (OUTPATIENT)
Dept: PHYSICAL THERAPY | Age: 64
Setting detail: THERAPIES SERIES
Discharge: HOME | End: 2024-06-04
Payer: MEDICARE

## 2024-06-04 PROCEDURE — 97110 THERAPEUTIC EXERCISES: CPT

## 2024-06-04 NOTE — FLOWSHEET NOTE
[x] [facility]  Outpatient Rehabilitation &  Therapy  [address]  [phone]  [phone] [] [facility]  Outpatient Rehabilitation &  Therapy  [address]  P: [phone]  F: [phone] [] [facility]  Outpatient Rehabilitation &  Therapy  [address]  P: [phone]  F: [phone] [] [facility]  Outpatient Rehabilitation &  Therapy  [address]  [phone]  [phone] [] [facility]  Outpatient Rehabilitation &  Therapy  [address]   P: [phone]  F: [phone]  [] [facility]  Outpatient Rehabilitation &  Therapy  [address]  P: [phone]  F: [phone] [] [facility]  Outpatient Rehabilitation &  Therapy  [address]  P: [phone]  F: [phone] [] [facility]  Outpatient Rehabilitation &  Therapy  [address]  P: [phone]  F: [phone] [] [facility]  Outpatient Rehabilitation &  Therapy  [address]  P: [phone]  F: [phone]  [] [facility] Outpatient Rehabilitation &  Therapy  [address]  P: [phone]  F: [phone]     Physical Therapy Daily Treatment Note    Date:  2024  Patient Name:  Vic Nunez    :  1960  MRN: [id number]  Physician: Saravanan Carpio MD        Insurance: [facility] MEDICARE, [place], [facility]  Medical Diagnosis: Displacement of lumbar intervertebral disc without myelopathy (M51.26)           Rehab Codes: M54.5, M54.2, M25.511, M25.512, M79.605  Onset Date: 5/3/24                                    Next 's appt: Pain Man. 24    Visit# / total visits: ;    Cancels/No Shows: 0/2    Subjective:    Pain:  [x] Yes  [] No Location: Low back  Pain Rating: (0-10 scale) 5/10  Pain altered Tx:  [] No  [] Yes

## 2024-06-06 ENCOUNTER — HOSPITAL ENCOUNTER (OUTPATIENT)
Dept: PHYSICAL THERAPY | Age: 64
Setting detail: THERAPIES SERIES
Discharge: HOME | End: 2024-06-06
Payer: MEDICARE

## 2024-06-06 NOTE — FLOWSHEET NOTE
[x] [facility]  Outpatient Rehabilitation &  Therapy  [address]  [phone]  [phone] [] [facility]  Outpatient Rehabilitation &  Therapy  [address]  P: [phone]  F: [phone] [] [facility]  Outpatient Rehabilitation &  Therapy  [address]  P: [phone]  F: [phone] [] [facility]  Outpatient Rehabilitation &  Therapy  [address]  [phone]  [phone] [] [facility]  Outpatient Rehabilitation &  Therapy  [address]   P: [phone]  F: [phone]  [] [facility]  Outpatient Rehabilitation &  Therapy  [address]  P: [phone]  F: [phone] [] [facility]  Outpatient Rehabilitation &  Therapy  [address]  P: [phone]  F: [phone] [] [facility]  Outpatient Rehabilitation &  Therapy  [address]  P: [phone]  F: [phone] [] [facility]  Outpatient Rehabilitation &  Therapy  [address]  P: [phone]  F: [phone]  [] [facility] Outpatient Rehabilitation &  Therapy  [address]  P: [phone]  F: [phone]     Therapy Cancel/No Show note    Date: 2024  Patient: Vic Nunez  : 1960  MRN: [id number]    Cancels/No Shows to date: 0/3    For today's appointment patient:    []  Cancelled    [] Rescheduled appointment    [x] No-show     Reason given by patient:    []  Patient ill    []  Conflicting appointment    [] No transportation      [] Conflict with work    [] No reason given    [] Weather related    [] COVID-19    [] Other:      Comments:  need to discuss attendance, may need to reduce to 1x/week.       [x] Next appointment was confirmed    Electronically signed by: Edmond Lowe PT

## 2024-06-11 ENCOUNTER — HOSPITAL ENCOUNTER (OUTPATIENT)
Dept: PHYSICAL THERAPY | Age: 64
Setting detail: THERAPIES SERIES
Discharge: HOME | End: 2024-06-11
Payer: MEDICARE

## 2024-06-11 PROCEDURE — 97110 THERAPEUTIC EXERCISES: CPT

## 2024-06-11 NOTE — FLOWSHEET NOTE
noted. Required increased rest breaks throughout treatment this date due to pain/fatigue. Held progressions this date secondary to late arrival as well as increased pain and symptoms noted at arrival. Did report feeling overall improvement in pain and symptoms post exercises and heat.   [x]  Patient will benefit form physical therapy to improve spinal mobility, core strength,and improve ADL function.       LTG: (to be met in 10 treatments)  ? Pain: 5/10 low back and cervical pain with ADLs.   ? ROM:Patient demonstrates improved trunk ROM in rotation side bending to 75% WNL.  ? Strength: Patient is able to bend and lift 20 lbs box from floor to waist without increasing back pain.   ? Function:Oswestry score to 44% impaired to improve ADL function.   Independent with Home Exercise Programs    Pt. Education:  [x] Yes  [] No  [x] Reviewed Prior HEP/Ed  Method of Education: [x] Verbal  [x] Demo  [] Written  Comprehension of Education:  [x] Verbalizes understanding.  [x] Demonstrates understanding.  [x] Needs review.  [] Demonstrates/verbalizes HEP/Ed previously given.     Plan: [x] Continue current frequency toward long and short term goals.    [x] Specific Instructions for subsequent treatments: core strengthening, add palloff press/rotation with band      Time In: 319 pm           Time Out: 359 pm    Electronically signed by:  Esha Gerard PTA

## 2024-06-13 ENCOUNTER — HOSPITAL ENCOUNTER (OUTPATIENT)
Dept: PHYSICAL THERAPY | Age: 64
Setting detail: THERAPIES SERIES
Discharge: HOME | End: 2024-06-13
Payer: MEDICARE

## 2024-06-13 NOTE — FLOWSHEET NOTE
[x] [facility]  Outpatient Rehabilitation &  Therapy  [address]  [phone]  [phone] [] [facility]  Outpatient Rehabilitation &  Therapy  [address]  P: [phone]  F: [phone] [] [facility]  Outpatient Rehabilitation &  Therapy  [address]  P: [phone]  F: [phone] [] [facility]  Outpatient Rehabilitation &  Therapy  [address]  [phone]  [phone] [] [facility]  [facility]patient [facility] &  Therapy  [address]   P: [phone]  F: [phone]  [] [facility]  Outpatient Rehabilitation &  Therapy  [address]  P: [phone]  F: [phone] [] [facility]  Outpatient Rehabilitation &  Therapy  [address]  P: [phone]  F: [phone] [] [facility]  Outpatient Rehabilitation &  Therapy  [address]  P: [phone]  F: [phone] [] [facility]  Outpatient Rehabilitation &  Therapy  [address]  P: [phone]  F: [phone]  [] [facility] Outpatient Rehabilitation &  Therapy  [address]  P: [phone]  F: [phone]     Therapy Cancel/No Show note    Date: 2024  Patient: Vic Nunez  : 1960  MRN: [id number]    Cancels/No Shows to date: 1/3    For today's appointment patient:    [x]  Cancelled    [] Rescheduled appointment    [] No-show     Reason given by patient:    []  Patient ill    []  Conflicting appointment    [] No transportation      [] Conflict with work    [] No reason given    [] Weather related    [] COVID-19    [] Other:      Comments:   CX pt LM will not be making it due to another appt.       [x] Next appointment was confirmed    Electronically signed by: Edmond Lowe PT

## 2024-06-18 ENCOUNTER — HOSPITAL ENCOUNTER (OUTPATIENT)
Dept: PHYSICAL THERAPY | Age: 64
Setting detail: THERAPIES SERIES
Discharge: HOME | End: 2024-06-18
Payer: MEDICARE

## 2024-06-18 ENCOUNTER — OFFICE VISIT (OUTPATIENT)
Dept: FAMILY MEDICINE CLINIC | Age: 64
End: 2024-06-18
Payer: MEDICARE

## 2024-06-18 VITALS
HEART RATE: 74 BPM | SYSTOLIC BLOOD PRESSURE: 133 MMHG | DIASTOLIC BLOOD PRESSURE: 86 MMHG | WEIGHT: 184 LBS | BODY MASS INDEX: 24.92 KG/M2 | HEIGHT: 72 IN

## 2024-06-18 DIAGNOSIS — Z72.0 TOBACCO ABUSE: ICD-10-CM

## 2024-06-18 DIAGNOSIS — R06.09 DYSPNEA ON EXERTION: ICD-10-CM

## 2024-06-18 DIAGNOSIS — E55.9 VITAMIN D DEFICIENCY: Primary | ICD-10-CM

## 2024-06-18 PROCEDURE — G8427 DOCREV CUR MEDS BY ELIG CLIN: HCPCS

## 2024-06-18 PROCEDURE — 4004F PT TOBACCO SCREEN RCVD TLK: CPT

## 2024-06-18 PROCEDURE — 3017F COLORECTAL CA SCREEN DOC REV: CPT

## 2024-06-18 PROCEDURE — 97110 THERAPEUTIC EXERCISES: CPT

## 2024-06-18 PROCEDURE — 99213 OFFICE O/P EST LOW 20 MIN: CPT

## 2024-06-18 PROCEDURE — G8420 CALC BMI NORM PARAMETERS: HCPCS

## 2024-06-18 PROCEDURE — 1123F ACP DISCUSS/DSCN MKR DOCD: CPT

## 2024-06-18 ASSESSMENT — ENCOUNTER SYMPTOMS
ABDOMINAL PAIN: 0
WHEEZING: 0
EYE PAIN: 0
SORE THROAT: 0
DIARRHEA: 0
VOMITING: 0
COUGH: 0
NAUSEA: 0
SHORTNESS OF BREATH: 0
CONSTIPATION: 0

## 2024-06-18 NOTE — PROGRESS NOTES
Attending Physician Statement  I have discussed the care of Vic Nunez, including pertinent history and exam findings,  with the resident. I have reviewed the key elements of all parts of the encounter with the resident.  I agree with the assessment, plan and orders as documented by the resident.  (GE Modifier)    Tamiko Morocho MD

## 2024-06-18 NOTE — PROGRESS NOTES
Vic Nunez (:  1960) is a 64 y.o. male,Established patient, here for evaluation of the following chief complaint(s):  Diabetes (Last A1C was 6.2 on 24)    Assessment & Plan     1. Dyspnea on exertion  2. Tobacco abuse  3. Vitamin D deficiency  -     Vitamin D 25 Hydroxy; Future    Continue current NRT at this time.    Also advised patient to complete pending blood work and testing.  Including PFT and echocardiogram.    Return in about 3 months (around 2024), or if symptoms worsen or fail to improve, for dyspnea on exertion.       Subjective     HPI    Mr. Vic Nunez, 64-year-old male, with previous medical history of DM Type II (now in pre-DM range), chronic low back pain, right lung adenocarcinoma s/p right upper lobectomy many years ago, prediabetes, and history of cocaine positive in urine.    Presents to the [facility] office today for follow-up on DM.    Patient is actually not due for an A1c at this time.  Patient's previous A1c in May was 6.2.  Knows in the prediabetic range.  Most of patient's A1c's have been in the pre-DM range.    Patient blood pressure today is 133/86.  Pulse 74.    Patient also has history of vitamin D deficiency.  Vitamin D levels were ordered at his previous visit however patient did not complete.  We will reorder today.  Patient also has echocardiogram and PFTs pending.  Patient does not complain of any dyspnea on exertion today.    Patient has improved significantly on his cigarette consumption.  Patient states that he only smokes few cigarettes a day now.  The NRT is helping patient and patient's own motivation to quit has helped him to cut down as well.    Patient does not have any other acute concerns at this time.    Review of Systems   Constitutional:  Negative for activity change, appetite change and fever.   HENT:  Negative for congestion and sore throat.    Eyes:  Negative for pain and visual disturbance.   Respiratory:  Negative for

## 2024-06-18 NOTE — PROGRESS NOTES
Visit Information    Have you changed or started any medications since your last visit including any over-the-counter medicines, vitamins, or herbal medicines? no   Have you stopped taking any of your medications? Is so, why? -  no  Are you having any side effects from any of your medications? - no    Have you seen any other physician or provider since your last visit?  yes - PT 6/11/24   Have you had any other diagnostic tests since your last visit?  no   Have you been seen in the emergency room and/or had an admission in a hospital since we last saw you?  no   Have you had your routine dental cleaning in the past 6 months?  no     Do you have an active MyChart account? If no, what is the barrier?  Yes    Patient Care Team:  Shirin Coe MD as PCP - General (Family Medicine)  Tamiko Morocho MD as PCP - Empaneled Provider  Henri Oliveira MD as Consulting Physician (Hematology and Oncology)    Medical History Review  Past Medical, Family, and Social History reviewed and does contribute to the patient presenting condition    Health Maintenance   Topic Date Due    Shingles vaccine (1 of 2) Never done    Respiratory Syncytial Virus (RSV) Pregnant or age 60 yrs+ (1 - 1-dose 60+ series) Never done    Lipids  07/23/2021    AAA screen  Never done    Colorectal Cancer Screen  03/17/2025    A1C test (Diabetic or Prediabetic)  05/03/2025    Depression Monitoring  05/31/2025    DTaP/Tdap/Td vaccine (2 - Td or Tdap) 12/01/2026    Annual Wellness Visit (Medicare Advantage)  Completed    Flu vaccine  Completed    Pneumococcal 65+ years Vaccine  Completed    COVID-19 Vaccine  Completed    Hepatitis C screen  Completed    Hepatitis A vaccine  Aged Out    Hepatitis B vaccine  Aged Out    Hib vaccine  Aged Out    Polio vaccine  Aged Out    Meningococcal (ACWY) vaccine  Aged Out    Pneumococcal 0-64 years Vaccine  Discontinued    HIV screen  Discontinued

## 2024-06-18 NOTE — FLOWSHEET NOTE
[x] [facility]  Outpatient Rehabilitation &  Therapy  [address]  [phone]  [phone]     Physical Therapy Daily Treatment Note    Date:  2024  Patient Name:  Vic Nunez    :  1960  MRN: [id number]  Physician: Saravanan Carpio MD        Insurance: AETNA MEDICARE, [place], [facility]  Medical Diagnosis: Displacement of lumbar intervertebral disc without myelopathy (M51.26)           Rehab Codes: M54.5, M54.2, M25.511, M25.512, M79.605  Onset Date: 5/3/24                                    Next 's appt: Pain Man. 24      Visit# / total visits: ;    Cancels/No Shows: 1/3    Subjective:    Pain:  [x] Yes  [] No Location: Low back  Pain Rating: (0-10 scale) \"35\"/10  Pain altered Tx:  [] No  [] Yes  Action:  Comments: Patient reports continues to have pain in the low back. Walked here from another appt.       Objective:  Modalities: Hot pack in supine to lumbar spine x 10 minutes.   Precautions:  Exercises:  Exercise Reps/ Time Weight/ Level Comments   SCIFIT 6 min L6          Standing       Calf stretch  3x20\"     3 way hip  20x [place] Modified to band   Marching  20x 3 lb    HS curls  20x ea 3 lb    Calf raises  30x 3 lb          Bands      -Rows 20x plum    - Lat Ext 20x plum    - Palloff rotation 20x ea plum Added /18   Leg press 2x30x 50 lb  Patient self increased reps 6/18         Seated      Trunk chops  2x15x 6 lb MB          Trunk Rotation  2x15x 6 lb MB          Supine       Knee to chest  3x20\"      LTR 20x     March  20x 3 lb    SLR 20x  3 lb    Bridges 20x 3 lb                      Other:      Treatment Charges: Mins Units   [x]  Modalities, HP  10 0   [x]  Ther Exercise 40 3   []  Manual Therapy     []  Ther Activities     []  Neuro Re-ed     []  Vasocompression     [] Gait     []  Other     Total Billable time 40 3       Assessment: [x] Progressing toward goals.  Added palloff rotations for continued core activation and strengthening with good

## 2024-06-19 DIAGNOSIS — E78.2 MIXED HYPERLIPIDEMIA: ICD-10-CM

## 2024-06-19 DIAGNOSIS — Z13.220 SCREENING FOR HYPERLIPIDEMIA: ICD-10-CM

## 2024-06-19 RX ORDER — ATORVASTATIN CALCIUM 20 MG/1
20 TABLET, FILM COATED ORAL DAILY
Qty: 100 TABLET | Refills: 0 | OUTPATIENT
Start: 2024-06-19

## 2024-06-19 NOTE — TELEPHONE ENCOUNTER
Last visit: 05/31/2024  Last Med refill: 05/24/2024  Does patient have enough medication for 72 hours: Yes     Next Visit Date:  Future Appointments   Date Time Provider Department Center   6/20/2024  3:15 PM Edmond Lowe, PT [facility]   6/24/2024  2:20 PM Maggie Santos MD [facility]   6/25/2024  3:00 PM Edmond Lowe, PT [facility]   6/27/2024  3:00 PM Edmond Lowe, PT [facility]   9/16/2024  2:20 PM Nakia Alexander MD [facility]       Health Maintenance   Topic Date Due    Shingles vaccine (1 of 2) Never done    Respiratory Syncytial Virus (RSV) Pregnant or age 60 yrs+ (1 - 1-dose 60+ series) Never done    Lipids  07/23/2021    AAA screen  Never done    Colorectal Cancer Screen  03/17/2025    A1C test (Diabetic or Prediabetic)  05/03/2025    Depression Monitoring  05/31/2025    DTaP/Tdap/Td vaccine (2 - Td or Tdap) 12/01/2026    Annual Wellness Visit (Medicare Advantage)  Completed    Flu vaccine  Completed    Pneumococcal 65+ years Vaccine  Completed    COVID-19 Vaccine  Completed    Hepatitis C screen  Completed    Hepatitis A vaccine  Aged Out    Hepatitis B vaccine  Aged Out    Hib vaccine  Aged Out    Polio vaccine  Aged Out    Meningococcal (ACWY) vaccine  Aged Out    Pneumococcal 0-64 years Vaccine  Discontinued    HIV screen  Discontinued       Hemoglobin A1C (%)   Date Value   05/03/2024 6.2   10/02/2023 6.0   07/23/2020 5.9             ( goal A1C is < 7)   No components found for: \"LABMICR\"  No components found for: \"LDLCHOLESTEROL\", \"LDLCALC\"    (goal LDL is <100)   AST (U/L)   Date Value   10/02/2023 20     ALT (U/L)   Date Value   10/02/2023 19     BUN (mg/dL)   Date Value   10/02/2023 13     BP Readings from Last 3 Encounters:   06/18/24 133/86   05/31/24 117/77   05/03/24 116/69          (goal 120/80)    All Future Testing planned in [facility]  Lab Frequency Next Occurrence   Full PFT Study With Bronchodilator Once 10/02/2023

## 2024-06-20 ENCOUNTER — HOSPITAL ENCOUNTER (OUTPATIENT)
Dept: PHYSICAL THERAPY | Age: 64
Setting detail: THERAPIES SERIES
Discharge: HOME | End: 2024-06-20
Payer: MEDICARE

## 2024-06-20 NOTE — FLOWSHEET NOTE
[x] [facility]  Outpatient Rehabilitation &  Therapy  [address]  [phone]  [phone] [] [facility]  Outpatient Rehabilitation &  Therapy  [address]  P: [phone]  F: [phone] [] [facility]  Outpatient Rehabilitation &  Therapy  [address]  P: [phone]  F: [phone] [] [facility]  Outpatient Rehabilitation &  Therapy  [address]  [phone]  [phone] [] [facility]  Outpatient Rehabilitation &  Therapy  [address]   P: [phone]  F: [phone]  [] [facility]  Outpatient Rehabilitation &  Therapy  [address]  P: [phone]  F: [phone] [] [facility]  Outpatient Rehabilitation &  Therapy  [address]  P: [phone]  F: [phone] [] [facility]  Outpatient Rehabilitation &  Therapy  [address]  P: [phone]  F: [phone] [] [facility]  Outpatient Rehabilitation &  Therapy  [address]  P: [phone]  F: [phone]  [] [facility] Outpatient Rehabilitation &  Therapy  [address]  P: [phone]  F: [phone]     Therapy Cancel/No Show note    Date: 2024  Patient: Vic Nunez  : 1960  MRN: [id number]    Cancels/No Shows to date: 2 cx/ 3 ns    For today's appointment patient:    [x]  Cancelled    [] Rescheduled appointment    [] No-show     Reason given by patient:    []  Patient ill    [x]  Conflicting appointment    [] No transportation      [] Conflict with work    [] No reason given    [] Weather related    [] COVID-19    [] Other:      Comments: Cannot make it out of prior appointment.       [x] Next appointment was confirmed    Electronically signed by: Yessy Sanchez, PT

## 2024-06-25 ENCOUNTER — HOSPITAL ENCOUNTER (OUTPATIENT)
Dept: PHYSICAL THERAPY | Age: 64
Setting detail: THERAPIES SERIES
Discharge: HOME | End: 2024-06-25
Payer: MEDICARE

## 2024-06-25 PROCEDURE — 97110 THERAPEUTIC EXERCISES: CPT

## 2024-06-25 NOTE — FLOWSHEET NOTE
Total Billable time 43 3       Assessment: [x] Progressing toward goals.  Patient demonstrates good tolerance to all exercises. With mild fatigue noted during Seated trunk chops but this is mostly due to chronic left shoulder issues.     [] No change.     [] Other:    [x]  Patient will benefit from physical therapy to improve spinal mobility, core strength,and improve ADL function.       LTG: (to be met in 10 treatments)  ? Pain: 5/10 low back and cervical pain with ADLs.   ? ROM:Patient demonstrates improved trunk ROM in rotation side bending to 75% WNL.  ? Strength: Patient is able to bend and lift 20 lbs box from floor to waist without increasing back pain.   ? Function:Oswestry score to 44% impaired to improve ADL function.   Independent with Home Exercise Programs    Pt. Education:  [x] Yes  [] No  [x] Reviewed Prior HEP/Ed  Method of Education: [x] Verbal  [x] Demo  [] Written  Comprehension of Education:  [x] Verbalizes understanding.  [x] Demonstrates understanding.  [x] Needs review.  [] Demonstrates/verbalizes HEP/Ed previously given.     Plan: [x] Continue current frequency toward long and short term goals.    [x] Specific Instructions for subsequent treatments: Add box lifts       Time In: 1452 pm           Time Out: 1545 pm    Electronically signed by:  Edmond Lowe, PT

## 2024-06-27 ENCOUNTER — HOSPITAL ENCOUNTER (OUTPATIENT)
Dept: PHYSICAL THERAPY | Age: 64
Setting detail: THERAPIES SERIES
Discharge: HOME | End: 2024-06-27
Payer: MEDICARE

## 2024-06-27 NOTE — FLOWSHEET NOTE
[x] [facility]  Outpatient Rehabilitation &  Therapy  [address]  [phone]  [phone] [] [facility]  Outpatient Rehabilitation &  Therapy  [address]  P: [phone]  F: [phone] [] [facility]  Outpatient Rehabilitation &  Therapy  [address]  P: [phone]  F: [phone] [] [facility]  Outpatient Rehabilitation &  Therapy  [address]  [phone]  [phone] [] [facility]  Outpatient Rehabilitation &  Therapy  [address]   P: [phone]  F: [phone]  [] [facility]  Outpatient Rehabilitation &  Therapy  [address]  P: [phone]  F: [phone] [] [facility]  Outpatient Rehabilitation &  Therapy  [address]  P: [phone]  F: [phone] [] [facility]  Outpatient Rehabilitation &  Therapy  [address]  P: [phone]  F: [phone] [] [facility]  Outpatient Rehabilitation &  Therapy  [address]  P: [phone]  F: [phone]  [] [facility] Outpatient Rehabilitation &  Therapy  [address]  P: [phone]  F: [phone]     Therapy Cancel/No Show note    Date: 2024  Patient: Vic Nunez  : 1960  MRN: [id number]    Cancels/No Shows to date: 3 cx/ 3 ns    For today's appointment patient:    [x]  Cancelled    [] Rescheduled appointment    [] No-show     Reason given by patient:    []  Patient ill    [x]  Conflicting appointment    [] No transportation      [] Conflict with work    [] No reason given    [] Weather related    [] COVID-19    [] Other:      Comments:  CX pt is having a pre for colostomy      [x] Next appointment was confirmed    Electronically signed by: Edmond Lowe PT

## 2024-07-11 ENCOUNTER — HOSPITAL ENCOUNTER (OUTPATIENT)
Dept: PHYSICAL THERAPY | Age: 64
Setting detail: THERAPIES SERIES
Discharge: HOME | End: 2024-07-11
Payer: MEDICARE

## 2024-07-11 PROCEDURE — 97140 MANUAL THERAPY 1/> REGIONS: CPT

## 2024-07-11 PROCEDURE — 97110 THERAPEUTIC EXERCISES: CPT

## 2024-07-11 NOTE — FLOWSHEET NOTE
[x] [facility]  Outpatient Rehabilitation &  Therapy  [address]  [phone]  [phone]     Physical Therapy Daily Treatment Note    Date:  2024  Patient Name:  Vic Nunez    :  1960  MRN: [id number]  Physician: Saravanan Carpio MD        Insurance: [employer]NA MEDICARE, [place], [place]  Medical Diagnosis: Displacement of lumbar intervertebral disc without myelopathy (M51.26)           Rehab Codes: M54.5, M54.2, M25.511, M25.512, M79.605  Onset Date: 5/3/24                                   Next 's appt: Pain Man.24     Visit# / total visits: 8/10;    Cancels/No Shows: 3 cx/ 4 ns    Subjective:    Pain:  [x] Yes  [] No Location: Low back  Pain Rating: (0-10 scale) 10/10  Pain altered Tx:  [x] No  [] Yes  Action:  Comments:  Patient reports same back pain he always has.  Notes pain and stiffness in the low back.      Objective:  Modalities: Hot pack in supine to lumbar spine x 10 minutes.   Precautions:  Exercises:  Exercise Reps/ Time Weight/ Level Comments   SCIFIT 5 min L6          Standing       Calf stretch  3x20\"     4 way hip  20x [place] Modified to band   Marching  20x 3 lb    HS curls  20x ea 3 lb    Calf raises  30x 3 lb          Box lifts    ADD         Bands      -Rows 20x plum    - Lat Ext 20x plum    - Palloff rotation 20x ea plum    Leg press 2x30x 60 lb  Inc wt 7.11         Seated   Added Physio ball 7.11   Trunk chops  2x15x 6 lb MB    Trunk Rotation  2x15x 6 lb MB          Supine       Knee to chest  3x20\"      LTR 20x     March  20x 3 lb    SLR 20x  3 lb    Bridges 20x 3 lb                      Other:  Manual:  Hyper volt to low back to mid back in prone x10 min      Treatment Charges: Mins Units   []  Modalities, HP      [x]  Ther Exercise 45 3   [x]  Manual Therapy 10 1   []  Ther Activities     []  Neuro Re-ed     []  Vasocompression     [] Gait     []  Other     Total Billable time 55 4       Assessment: [x] Progressing toward goals.

## 2024-07-16 ENCOUNTER — HOSPITAL ENCOUNTER (OUTPATIENT)
Dept: PHYSICAL THERAPY | Age: 64
Setting detail: THERAPIES SERIES
Discharge: HOME | End: 2024-07-16
Payer: MEDICARE

## 2024-07-16 PROCEDURE — 97110 THERAPEUTIC EXERCISES: CPT

## 2024-07-16 PROCEDURE — 97140 MANUAL THERAPY 1/> REGIONS: CPT

## 2024-07-16 NOTE — FLOWSHEET NOTE
[x] [facility]  Outpatient Rehabilitation &  Therapy  [address]  [phone]  [phone]     Physical Therapy Daily Treatment Note    Date:  2024  Patient Name:  Vic Nunez    :  1960  MRN: [id number]  Physician: Saravanan Carpio MD        Insurance: AETNA MEDICARE, [place], [place]  Medical Diagnosis: Displacement of lumbar intervertebral disc without myelopathy (M51.26)           Rehab Codes: M54.5, M54.2, M25.511, M25.512, M79.605  Onset Date: 5/3/24                                   Next 's appt: Pain Man.24     Visit# / total visits: 9/10;    Cancels/No Shows: 3 cx/ 4 ns    Subjective:    Pain:  [x] Yes  [] No Location: Low back  Pain Rating: (0-10 scale) 10/10  Pain altered Tx:  [x] No  [] Yes  Action:  Comments:  Patient states pain in the back is chronic, continues to be present/high. Notes has improved only minimally since completing PT.       Objective:  Modalities: Hot pack in supine to lumbar spine x 10 minutes.   Precautions:  Exercises:  Exercise Reps/ Time Weight/ Level Comments   SCIFIT 5 min L6          Standing       Calf stretch  3x20\"     4 way hip  20x [place] Modified to band    Marching  20x 3 lb    HS curls  20x ea 3 lb    Calf raises  30x 3 lb          Box lifts  15x 25lb Added 7/16         Bands      -Rows 20x plum    - Lat Ext 20x plum    - Palloff rotation 20x ea plum    Leg press 2x30x 60 lb           Seated      Trunk chops  2x15x 6 lb MB    Trunk Rotation  2x15x 6 lb MB          Supine       Knee to chest  3x20\"      LTR 20x     March  20x 3 lb    SLR 20x  3 lb    Bridges 20x 3 lb                      Other:  Manual:  Hyper volt to low back to mid back in prone x8 min      Treatment Charges: Mins Units   []  Modalities, HP      [x]  Ther Exercise 40 2   [x]  Manual Therapy 8 1   []  Ther Activities     []  Neuro Re-ed     []  Vasocompression     [] Gait     []  Other     Total Billable time 48 3       Assessment: [x] Progressing

## 2024-07-23 ENCOUNTER — HOSPITAL ENCOUNTER (OUTPATIENT)
Dept: PHYSICAL THERAPY | Age: 64
Setting detail: THERAPIES SERIES
Discharge: HOME | End: 2024-07-23
Payer: MEDICARE

## 2024-07-23 NOTE — FLOWSHEET NOTE
[x] [facility]  Outpatient Rehabilitation &  Therapy  [address]  [phone]  [phone] [] [facility]  Outpatient Rehabilitation &  Therapy  [address]  P: [phone]  F: [phone] [] [facility]  Outpatient Rehabilitation &  Therapy  [address]  P: [phone]  F: [phone] [] [facility]  Outpatient Rehabilitation &  Therapy  [address]  [phone]  [phone] [] [facility]  Outpatient Rehabilitation &  Therapy  [address]   P: [phone]  F: [phone]  [] [facility]  Outpatient Rehabilitation &  Therapy  [address]  P: [phone]  F: [phone] [] [facility]  Outpatient Rehabilitation &  Therapy  [address]  P: [phone]  F: [phone] [] [facility]  Outpatient Rehabilitation &  Therapy  [address]  P: [phone]  F: [phone] [] [facility]  Outpatient Rehabilitation &  Therapy  [address]  P: [phone]  F: [phone]  [] [facility] Outpatient Rehabilitation &  Therapy  [address]  P: [phone]  F: [phone]     Therapy Cancel/No Show note    Date: 2024  Patient: Vic Nunez  : 1960  MRN: [id number]    Cancels/No Shows to date: 4 cx/ 4 ns    For today's appointment patient:    [x]  Cancelled    [] Rescheduled appointment    [] No-show     Reason given by patient:    []  Patient ill    [x]  Conflicting appointment    [] No transportation      [] Conflict with work    [] No reason given    [] Weather related    [] COVID-19    [] Other:      Comments: ONE/LAST appt scheduled, will be discharged if does not attend.      [x] Next appointment was confirmed    Electronically signed by: Esha Gerard PTA

## 2024-07-30 ENCOUNTER — HOSPITAL ENCOUNTER (OUTPATIENT)
Dept: PHYSICAL THERAPY | Age: 64
Setting detail: THERAPIES SERIES
Discharge: HOME | End: 2024-07-30
Payer: MEDICARE

## 2024-07-30 PROCEDURE — 97110 THERAPEUTIC EXERCISES: CPT

## 2024-07-30 NOTE — FLOWSHEET NOTE
Gerard    Exercises  - Seated Diagonal Chops with Medicine Ball  - 1 x daily - 7 x weekly - 2 sets - 10 reps  - Hip Abduction with Resistance Loop  - 1 x daily - 7 x weekly - 3 sets - 10 reps  - Hip Extension with Resistance Loop  - 1 x daily - 7 x weekly - 3 sets - 10 reps  - Standing Hip Flexion with Resistance Loop  - 1 x daily - 7 x weekly - 3 sets - 10 reps  - Standing Heel Raise  - 1 x daily - 7 x weekly - 3 sets - 10 reps  - Standing Shoulder Row with Anchored Resistance  - 1 x daily - 7 x weekly - 3 sets - 10 reps  - Squat with Chair Touch  - 1 x daily - 7 x weekly - 3 sets - 10 reps    Plan: [x] Discharge       Time In: 1448 pm           Time Out: 1542 pm    Electronically signed by:  Laney Sanchez PTA

## 2024-07-30 NOTE — DISCHARGE SUMMARY
[x] [facility]  Outpatient Rehabilitation &  Therapy  [address]  [phone]  [phone] [] [facility]  Outpatient Rehabilitation &  Therapy  [address]  P: [phone]  F: [phone] [] [facility]  Outpatient Rehabilitation &  Therapy  [address]  P: [phone]  F: [phone] [] [facility]  Outpatient Rehabilitation &  Therapy  [address]  [phone]  [phone] [] [facility]  Outpatient Rehabilitation &  Therapy  [address]   P: [phone]  F: [phone]  [] [facility]  Outpatient Rehabilitation &  Therapy  [address]  P: [phone]  F: [phone] [] [facility]  Outpatient Rehabilitation &  Therapy  [address]  P: [phone]  F: [phone] [] [facility]  Outpatient Rehabilitation &  Therapy  [address]  P: [phone]  F: [phone] [] [facility]  Outpatient Rehabilitation &  Therapy  [address]  P: [phone]  F: [phone]  [] [facility] Outpatient Rehabilitation &  Therapy  [address]  P: [phone]  F: [phone]     Physical Therapy Discharge Note    Date: 2024      Patient: Vic Nunez  : 1960  MRN: [id number]    Physician: Saravanan Carpio MD        Insurance: [facility] MEDICARE, [place], [facility]  Medical Diagnosis: Displacement of lumbar intervertebral disc without myelopathy (M51.26)           Rehab Codes: M54.5, M54.2, M25.511, M25.512, M79.605  Onset Date: 5/3/24                                   Next 's appt: Pain Man.24     Visit# / total visits: 10/10;       Cancels/No Shows: 4 cx/ 4 ns  Date of initial visit: 5/15/24                Date of final visit: 24      Subjective:    Pain:  [x] Yes  [] No   Location: Low back

## 2024-08-14 ENCOUNTER — TELEPHONE (OUTPATIENT)
Dept: FAMILY MEDICINE CLINIC | Age: 64
End: 2024-08-14

## 2024-08-14 DIAGNOSIS — Z72.0 TOBACCO ABUSE: ICD-10-CM

## 2024-08-14 DIAGNOSIS — I10 ESSENTIAL (PRIMARY) HYPERTENSION: ICD-10-CM

## 2024-08-14 DIAGNOSIS — Z13.6 ENCOUNTER FOR ABDOMINAL AORTIC ANEURYSM (AAA) SCREENING: Primary | ICD-10-CM

## 2024-08-14 NOTE — TELEPHONE ENCOUNTER
Deyanira from scheduling called stating Dr. Coe ordered an US Abdominal - Aorta Limited and he did not use the correct procedure code, she stated it needs to be re-ordered using code [id number], please place order.

## 2024-08-14 NOTE — TELEPHONE ENCOUNTER
Deyanira from scheduling called and stated that Dr. Coe ordered an US abdominal - Aorta Limited, but did not use the correct procedure code, can you re-order using code  [id number].

## 2024-09-09 DIAGNOSIS — E55.9 VITAMIN D DEFICIENCY: ICD-10-CM

## 2024-09-09 DIAGNOSIS — R06.09 DYSPNEA ON EXERTION: ICD-10-CM

## 2024-09-09 DIAGNOSIS — R73.03 PREDIABETES: ICD-10-CM

## 2024-09-09 RX ORDER — ALBUTEROL SULFATE 90 UG/1
2 AEROSOL, METERED RESPIRATORY (INHALATION) 2 TIMES DAILY PRN
Qty: 1 EACH | Refills: 0 | Status: SHIPPED | OUTPATIENT
Start: 2024-09-09

## 2024-09-09 RX ORDER — CHOLECALCIFEROL (VITAMIN D3) 25 MCG
1 TABLET ORAL DAILY
Qty: 90 TABLET | Refills: 0 | Status: SHIPPED | OUTPATIENT
Start: 2024-09-09

## 2024-09-09 RX ORDER — ERGOCALCIFEROL 1.25 MG/1
50000 CAPSULE, LIQUID FILLED ORAL WEEKLY
Qty: 12 CAPSULE | Refills: 1 | Status: SHIPPED | OUTPATIENT
Start: 2024-09-09

## 2024-09-12 DIAGNOSIS — Z13.220 SCREENING FOR HYPERLIPIDEMIA: ICD-10-CM

## 2024-09-12 DIAGNOSIS — E78.2 MIXED HYPERLIPIDEMIA: ICD-10-CM

## 2024-09-12 RX ORDER — ATORVASTATIN CALCIUM 20 MG/1
20 TABLET, FILM COATED ORAL DAILY
Qty: 100 TABLET | Refills: 0 | Status: SHIPPED | OUTPATIENT
Start: 2024-09-12

## 2024-09-24 RX ORDER — SODIUM, POTASSIUM,MAG SULFATES 17.5-3.13G
1 SOLUTION, RECONSTITUTED, ORAL ORAL ONCE
Qty: 1 EACH | Refills: 0 | Status: SHIPPED | OUTPATIENT
Start: 2024-09-24 | End: 2024-09-24

## 2024-09-26 ENCOUNTER — CARE COORDINATION (OUTPATIENT)
Dept: CARE COORDINATION | Age: 64
End: 2024-09-26

## 2024-09-26 ENCOUNTER — OFFICE VISIT (OUTPATIENT)
Dept: FAMILY MEDICINE CLINIC | Age: 64
End: 2024-09-26
Payer: COMMERCIAL

## 2024-09-26 VITALS
HEIGHT: 72 IN | HEART RATE: 77 BPM | SYSTOLIC BLOOD PRESSURE: 128 MMHG | BODY MASS INDEX: 24.79 KG/M2 | DIASTOLIC BLOOD PRESSURE: 82 MMHG | WEIGHT: 183 LBS

## 2024-09-26 DIAGNOSIS — Z72.0 TOBACCO ABUSE: ICD-10-CM

## 2024-09-26 DIAGNOSIS — H40.20X0 PRIMARY ANGLE-CLOSURE GLAUCOMA, UNSPECIFIED GLAUCOMA STAGE, UNSPECIFIED LATERALITY, UNSPECIFIED PRIMARY ANGLE-CLOSURE GLAUCOMA TYPE: ICD-10-CM

## 2024-09-26 DIAGNOSIS — E55.9 VITAMIN D DEFICIENCY: ICD-10-CM

## 2024-09-26 DIAGNOSIS — R73.03 PREDIABETES: Primary | ICD-10-CM

## 2024-09-26 DIAGNOSIS — M51.26 DISPLACEMENT OF LUMBAR INTERVERTEBRAL DISC WITHOUT MYELOPATHY: Chronic | ICD-10-CM

## 2024-09-26 LAB — HBA1C MFR BLD: 6.1 %

## 2024-09-26 PROCEDURE — 90656 IIV3 VACC NO PRSV 0.5 ML IM: CPT

## 2024-09-26 PROCEDURE — 83036 HEMOGLOBIN GLYCOSYLATED A1C: CPT

## 2024-09-26 RX ORDER — NICOTINE 21 MG/24HR
1 PATCH, TRANSDERMAL 24 HOURS TRANSDERMAL EVERY 24 HOURS
Qty: 28 PATCH | Refills: 0 | Status: SHIPPED | OUTPATIENT
Start: 2024-09-26 | End: 2024-10-26

## 2024-09-26 RX ORDER — ERGOCALCIFEROL 1.25 MG/1
50000 CAPSULE, LIQUID FILLED ORAL WEEKLY
Qty: 12 CAPSULE | Refills: 1 | Status: SHIPPED | OUTPATIENT
Start: 2024-09-26

## 2024-09-26 RX ORDER — IBUPROFEN 800 MG/1
800 TABLET, FILM COATED ORAL 2 TIMES DAILY PRN
Qty: 60 TABLET | Refills: 0 | Status: SHIPPED | OUTPATIENT
Start: 2024-09-26

## 2024-09-26 RX ORDER — DULOXETIN HYDROCHLORIDE 30 MG/1
30 CAPSULE, DELAYED RELEASE ORAL DAILY
Qty: 90 CAPSULE | Refills: 0 | Status: SHIPPED | OUTPATIENT
Start: 2024-09-26

## 2024-09-26 RX ORDER — CHOLECALCIFEROL (VITAMIN D3) 25 MCG
1 TABLET ORAL DAILY
Qty: 90 TABLET | Refills: 0 | Status: CANCELLED | OUTPATIENT
Start: 2024-09-26

## 2024-09-26 ASSESSMENT — ENCOUNTER SYMPTOMS
COUGH: 0
SHORTNESS OF BREATH: 0
STRIDOR: 0
BACK PAIN: 1

## 2024-09-27 ENCOUNTER — CARE COORDINATION (OUTPATIENT)
Dept: CARE COORDINATION | Age: 64
End: 2024-09-27

## 2024-09-27 DIAGNOSIS — Z72.0 TOBACCO ABUSE: ICD-10-CM

## 2024-09-27 DIAGNOSIS — J45.909 ASTHMA, UNSPECIFIED ASTHMA SEVERITY, UNSPECIFIED WHETHER COMPLICATED, UNSPECIFIED WHETHER PERSISTENT: Primary | ICD-10-CM

## 2024-10-08 ENCOUNTER — CARE COORDINATION (OUTPATIENT)
Dept: CARE COORDINATION | Age: 64
End: 2024-10-08

## 2024-10-08 NOTE — CARE COORDINATION
HC phoned and got the approval for patient's  transportation.  Patient has been approved and HC provided him with the  contact number and scheduling process for his transportation.  Patient was pleased. HC also inquired about the food resources  that the  was supposed to assist patient   with.  Patient stated that he spoke with the coordinator before   and got no results.    Plan of Care  Patient will contact the HC if he needs assistance  with dealing with the  for food  resources.

## 2024-10-18 ENCOUNTER — CARE COORDINATION (OUTPATIENT)
Dept: CARE COORDINATION | Age: 64
End: 2024-10-18

## 2024-10-18 NOTE — CARE COORDINATION
Ambulatory Care Coordination Note     10/18/2024 4:05 PM     Patient outreach attempt by this ACM today to offer care management services. ACM was unable to reach the patient by telephone today; left voice message requesting a return phone call to this ACM.     ACM: Raúl Ospina RN     Care Summary Note:     SDOH referral received from PCP.  RYAN Thornton is working with Patient.    Writer phoned Patient to introduce self and explain role in ambulatory care management.  Writer request a return call.  Writer's contact information provided.    PCP/Specialist follow up:   Future Appointments         Provider Specialty Dept Phone    12/23/2024 3:20 PM Nakia Alexander MD Family Medicine [phone]    3/20/2025 2:00 PM London Michel MD; SCHEDULE, [facility] RESPIRATORY SPEC Pulmonology [phone]            Follow Up:   Plan for next ACM outreach in approximately 1 week to complete:  - outreach attempt to offer care management services.

## 2024-10-22 ENCOUNTER — CARE COORDINATION (OUTPATIENT)
Dept: CARE COORDINATION | Age: 64
End: 2024-10-22

## 2024-10-22 NOTE — CARE COORDINATION
HC attempted to contact the patient, there was no answer.  HC left a message for the patient and requested that he  return the call.    Plan of Care  HC will await a return call from the patient

## 2024-10-28 ENCOUNTER — CARE COORDINATION (OUTPATIENT)
Dept: CARE COORDINATION | Age: 64
End: 2024-10-28
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
## 2024-10-29 ENCOUNTER — CARE COORDINATION (OUTPATIENT)
Dept: CARE COORDINATION | Age: 64
End: 2024-10-29
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
## 2024-11-01 ENCOUNTER — CARE COORDINATION (OUTPATIENT)
Dept: CARE COORDINATION | Age: 64
End: 2024-11-01

## 2024-11-01 NOTE — CARE COORDINATION
HC phoned patient today to follow up with him regarding  receiving his insurance card that allows him to purchase   groceries.  Patient stated that the card arrived on yesterday  and he was able to go grocery shopping last night. Patient   was happy to share that with the HC.  His other concern  was how can he get he and his family out of the poor  housing situation.  Patient stated that he was told that  he has to make 3 x his income to qualify for [facility].  HC  will make inquiries in the interest of patient and many  others.    HC engaged patient in conversation regarding the ACP.  Patient was totally not aware of the process and after  hearing information about the ACP, patient agreed that  he would like to receive ACP documents.    Plan of Care  HC will research income qualifiers for [facility]  HC will make a request for ACP documents to  be mailed to patient.

## 2024-11-01 NOTE — CARE COORDINATION
Writer mailed out an ACP Packet to Patient on today 11/01/2024 from [address] [place] [address].

## 2024-11-06 ENCOUNTER — CARE COORDINATION (OUTPATIENT)
Dept: CARE COORDINATION | Age: 64
End: 2024-11-06

## 2024-11-06 NOTE — CARE COORDINATION
Ambulatory Care Coordination Note     11/6/2024 4:14 PM     Patient outreach attempt by this ACM today to offer care management services. ACM was unable to reach the patient by telephone today; left voice message requesting a return phone call to this ACM.     ACM: Raúl Ospina RN     Care Summary Note:     SDOH referral received from PCP.  RYAN Thornton is working with Patient.     Writer phoned Patient to introduce self and explain role in ambulatory care management.  Writer request a return call.  Writer's contact information provided.    ACM follow up to introduction letter mailed to Patient.    PCP/Specialist follow up:   Future Appointments         Provider Specialty Dept Phone    12/23/2024 3:20 PM Nakia Alexander MD Family Medicine [phone]    3/20/2025 2:00 PM London Michel MD; SCHEDULE, [facility] RESPIRATORY SPEC Pulmonology [phone]            Follow Up:   Plan for next ACM outreach in approximately 1 week to complete:  - outreach attempt to offer care management services.

## 2024-11-18 ENCOUNTER — CARE COORDINATION (OUTPATIENT)
Dept: CARE COORDINATION | Age: 64
End: 2024-11-18

## 2024-11-19 NOTE — CARE COORDINATION
HC phoned patient to discuss his need for better housing.  HC informed the patient that he will first need to show that  he has custody of his child.  HC phoned the patient and   informed him that he will need to apply for custody of his  child and provided him with the information of where to   go and provided the phone number as well.  Patient   was pleased to get  the information, and stated that he  will call right away to inquire of the process.    Plan of Care  HC will follow up with patient for details

## 2024-11-21 ENCOUNTER — CARE COORDINATION (OUTPATIENT)
Dept: CARE COORDINATION | Age: 64
End: 2024-11-21

## 2024-11-21 NOTE — CARE COORDINATION
HC phoned the patient to inquire of his follow up with   Family Court.  Patient stated the he called and was told   to go to the Juvenile Court to inquire about filing for custody  of his child.  HC shared with the patient that it makes it  better for him in regards to [facility] for getting a better and   larger apartment.    Plan of Care  Patient stated that he will keep the  HC in the loop.

## 2024-11-22 ENCOUNTER — CARE COORDINATION (OUTPATIENT)
Dept: CARE COORDINATION | Age: 64
End: 2024-11-22

## 2024-11-22 NOTE — CARE COORDINATION
HC phoned this patient and found that was doing well,  and plans to go to Juvenile Court for directions on filing  for custody or joint custody for his son.  HC continued   the conversation with the patient about the ACP, patient  agreed to have paperwork mailed out to him.    Plan of Care  HC will request that the ACP is  mailed  out to the patient.

## 2024-12-02 ENCOUNTER — CARE COORDINATION (OUTPATIENT)
Dept: CARE COORDINATION | Age: 64
End: 2024-12-02

## 2024-12-02 NOTE — CARE COORDINATION
Ambulatory Care Coordination Note     12/2/2024 2:45 PM     Patient outreach attempt by this AC today to offer care management services. AC was unable to reach the patient by telephone today;   left voice message requesting a return phone call to this ACM.     ACM: Raúl Ospina RN     Care Summary Note:     SDOH referral received for Patient. Numerous referrals were placed at office visit on 9/26/2024.  Writer phoned Patient to introduce self and explain ambulatory care Management.  Writer request a return call.  Writer's contact information provided.      PCP/Specialist follow up:   Future Appointments         Provider Specialty Dept Phone    12/9/2024 5:00 PM (Arrive by 4:45 PM) [facility] CT ROOM 1 Radiology [phone]    12/23/2024 3:20 PM Nakia Alexander MD Family Medicine [phone]    3/20/2025 2:00 PM London Michel MD; SCHEDULE, [facility] RESPIRATORY SPEC Pulmonology [phone]            Follow Up:   Plan for next ACM outreach in approximately 1 week to complete:  - outreach attempt to offer care management services.

## 2024-12-09 ENCOUNTER — HOSPITAL ENCOUNTER (OUTPATIENT)
Dept: CT IMAGING | Age: 64
Discharge: HOME | End: 2024-12-11
Payer: COMMERCIAL

## 2024-12-09 DIAGNOSIS — Z72.0 TOBACCO ABUSE: ICD-10-CM

## 2024-12-09 PROCEDURE — 71271 CT THORAX LUNG CANCER SCR C-: CPT

## 2024-12-10 ENCOUNTER — TELEPHONE (OUTPATIENT)
Dept: FAMILY MEDICINE CLINIC | Age: 64
End: 2024-12-10

## 2024-12-10 DIAGNOSIS — R91.1 LUNG NODULE SEEN ON IMAGING STUDY: Primary | ICD-10-CM

## 2024-12-10 NOTE — TELEPHONE ENCOUNTER
The CT lung screen come back, Radiology called to make sure the report was in patient's chart. (Which it is). Under Impression number one-  development of a confluent nodular opacity in the superior segment right lower lobe measuring 6.2* 4.1*6.8 cm concerning for malignancy. Please Advise

## 2024-12-11 NOTE — PROGRESS NOTES
Patient is a 64-year-old male with significant past medical history of COPD and active cigarette smoking.  Patient had a recent low-dose CT scan which showed a very highly suspicious nodule on the right lower lung very suspicious of malignancy.  Radiologist recommendation is diagnostic CT with contrast which will be ordered.  Also provide a referral to both pulmonology and hematology oncology.  Attempted to contact the patient 2 times left 2 voice messages and notified clinic if possible in the morning to contact him soon as possible.  Patient has upcoming appointment with writer in 12/23/2024 will attempt to call the patient again confirmed that he be seen on that day.

## 2024-12-11 NOTE — TELEPHONE ENCOUNTER
Patient had a recent low-dose screening CT of the chest performed.  Patient has significant cigarette smoking history.  Active cigarette smoker alongside marijuana smoker.  Imaging showed a confluent nodular opacity in the superior segment right lower lobe measuring 6 x 4 x 7 cm which is concerning for malignancy and another nodular opacity in the left apex measuring almost 2 cm.  Alongside moderate emphysema.  Attempted to call the patient automatically was sent to voicemail left a voicemail with will attempt to call again but advised the patient to call clinic in the morning.  Will once again refer the patient to both pulmonology but now will refer the patient to hematology oncology.  Patient likely will require biopsy of the nodule.

## 2024-12-11 NOTE — TELEPHONE ENCOUNTER
Writer printed out the referral and put them in the mail to be mailed out. Yes patient does have a follow up on 12/23/24.

## 2024-12-16 ENCOUNTER — TELEPHONE (OUTPATIENT)
Dept: ONCOLOGY | Age: 64
End: 2024-12-16

## 2024-12-16 DIAGNOSIS — R91.1 LUNG NODULE SEEN ON IMAGING STUDY: Primary | ICD-10-CM

## 2024-12-16 NOTE — TELEPHONE ENCOUNTER
Lung Navigator reviewing  recent Lung Screening and noting concerns, pt. Has been discharged from [facility] due to multiple no show.   Attn Pulmonary: please have Dr. Michel review recent Lung Screening, more than likely will need to move F/U up due to concerns  Thanks, Jessica ARAUZ Lung Navigator

## 2024-12-17 NOTE — TELEPHONE ENCOUNTER
Patient was referred and scheduled for asthma with us, as a new patient back in September for March. Left message asking pt to call to schedule an appt. Will continue to track/call.

## 2024-12-26 NOTE — TELEPHONE ENCOUNTER
Called patient, left another message for a call back. No communications HIPAA on file, mailed letter, asking for pt to call and schedule and appt with us.

## 2025-01-06 ENCOUNTER — CARE COORDINATION (OUTPATIENT)
Dept: CARE COORDINATION | Age: 65
End: 2025-01-06

## 2025-01-06 NOTE — CARE COORDINATION
Ambulatory Care Coordination Note     2025 2:35 PM     Patient Current Location:  Home: [address] [place], [place] [id number]     This patient was received as a referral from Provider.    ACM contacted the patient by telephone. Verified name and  with patient as identifiers. Provided introduction to self, and explanation of the ACM role.   Patient accepted care management services at this time.        ACM: Raúl Ospina RN     Challenges to be reviewed by the provider   Additional needs identified to be addressed with provider Yes  medications-Patient does not have Atorvastatin, Vitamin D3, Vitamin D, and Nicotine patch and gum.                Method of communication with provider: staff message.    Utilization: Has the patient been seen in the ED since your last call? no    Care Summary Note:     CC Plan:      Patient was discharged from [facility] due to multiple no shows    2.  Dr. Michel's office had been trying to contact Patient.  He has an appointment on 3/20/2025    3.  Patient has abnormal CT screen dated 2024.  See impression.    4.  PCP's note dated 2024 states plan to order PFT.  No order found in computer    5.  Patient has an order for Vascular Duplex abdominal aorta dated 2024.  This still needs to be done.      6.  Patient is scheduled to have a Colonoscopy on 1/10/2025.    7.  Patient has lab orders for Vitamin D 25 Hydroxy and Lipid panel dated 5/3/2024 and 2024.    Writer phoned Patient to introduce self and explain ambulatory care management.  Patient is in agreement for enrollment in AC.    Patient was informed numerous people have been trying to contact him.  He was discharged from the [facility] due to inability to contact Patient.  Devora Del Angel [facility], attempted to contact him to schedule an appointment with Dr. Michel for follow up on abnormal CT report dated 2024.  Patient states he just got a new phone.    Patient states

## 2025-01-07 ENCOUNTER — CARE COORDINATION (OUTPATIENT)
Dept: CARE COORDINATION | Age: 65
End: 2025-01-07

## 2025-01-07 ENCOUNTER — TELEPHONE (OUTPATIENT)
Dept: PHARMACY | Facility: CLINIC | Age: 65
End: 2025-01-07

## 2025-01-07 DIAGNOSIS — E78.2 MIXED HYPERLIPIDEMIA: ICD-10-CM

## 2025-01-07 DIAGNOSIS — E55.9 VITAMIN D DEFICIENCY: ICD-10-CM

## 2025-01-07 DIAGNOSIS — Z72.0 TOBACCO ABUSE: ICD-10-CM

## 2025-01-07 DIAGNOSIS — R06.09 DYSPNEA ON EXERTION: ICD-10-CM

## 2025-01-07 DIAGNOSIS — Z13.220 SCREENING FOR HYPERLIPIDEMIA: ICD-10-CM

## 2025-01-07 DIAGNOSIS — J42 CHRONIC BRONCHITIS, UNSPECIFIED CHRONIC BRONCHITIS TYPE (HCC): Primary | ICD-10-CM

## 2025-01-07 RX ORDER — NICOTINE 21 MG/24HR
1 PATCH, TRANSDERMAL 24 HOURS TRANSDERMAL EVERY 24 HOURS
Qty: 28 PATCH | Refills: 0 | Status: SHIPPED | OUTPATIENT
Start: 2025-01-07 | End: 2025-02-06

## 2025-01-07 RX ORDER — ERGOCALCIFEROL 1.25 MG/1
50000 CAPSULE, LIQUID FILLED ORAL WEEKLY
Qty: 12 CAPSULE | Refills: 1 | Status: SHIPPED | OUTPATIENT
Start: 2025-01-07

## 2025-01-07 RX ORDER — ATORVASTATIN CALCIUM 20 MG/1
20 TABLET, FILM COATED ORAL DAILY
Qty: 100 TABLET | Refills: 0 | Status: SHIPPED | OUTPATIENT
Start: 2025-01-07

## 2025-01-07 RX ORDER — ALBUTEROL SULFATE 90 UG/1
2 INHALANT RESPIRATORY (INHALATION) 2 TIMES DAILY PRN
Qty: 1 EACH | Refills: 0 | Status: SHIPPED | OUTPATIENT
Start: 2025-01-07

## 2025-01-07 NOTE — TELEPHONE ENCOUNTER
CLINICAL PHARMACY NOTE - Population [facility] Pharmacy Referral    Patient can be scheduled with:  Team Schedule- General Referrals, etc.    Received a referral from: Care Coordinator to discuss patient’s medications. Called patient to schedule a time to speak with a pharmacist over the telephone.     Patient was seen by his Dr. Alexander on 9/26/2024.  A portion of the plan of care is copied and pasted below:    Nicotine use disorder  -Patient has been smoking since he has been 6 years old and admits cutting down to about 3 to 5 cigarettes a day but has smoked 1 to 2 packs previously  -Has significant past medical history of lung cancer on the right upper lobe s/p resection  -Will offer dual nicotine replacement therapy including patch plus gum patient was agreeable    Writer enrolled Patient in care management yesterday.  He informed Writer that he did not receive his Nicotine gum and patches.      Writer would benefit from your assistance with smoking cessation.     Spoke to patient and advised them of the above message.    Patient verified understanding and scheduled their appointment: 01.10.25 @ 3:30 PM.    [facility]    [facility] Clinical [facility]   [phone] option 1     For Pharmacy Admin Tracking Only    Program: [facility] [employer]  CPA in place:  No  Recommendation Provided To: Patient/Caregiver: 1 via Telephone  Intervention Detail: Scheduled Appointment  Intervention Accepted By: Patient/Caregiver: 1  Gap Closed?: Yes   Time Spent (min): 10

## 2025-01-07 NOTE — CARE COORDINATION
Per ACM request, writer called patient to conduct 3 way call to Schedule CT chest / abd aorta scan.  Patient requests a call back on Wed. AM to get tests scheduled.  Will call patient tomorrow.     1.8.25 Return call to patient and conducted 3 way [facility] ([facility]aud[facility])to schedule tests: Test to be done @ [facility]    Thursday, January 16th @ 11am vascular duplex abdominal aorta scan- NPO after midnight, report to [facility] heart and vascular center.     CT of chest with contrast 1.16.25 @ 12pm NPO 2 hr prior    PFT 1.16.25 @ 1pm - no smoking / no inhalers 4 hrs prior.     Patient wrote down all information and verbally understands. Will advise ACM. CC f/u one week

## 2025-01-07 NOTE — PROGRESS NOTES
Patient is a 64-year-old male with significant past medical history of COPD, active cigarette smoking, history of active lung nodules.  Patient had a recent screening lung CT showing lung nodules evidence of possible malignancy.  Patient was referred to pulmonology.  Patient requires to follow-up with PCP but at the moment we will order DME for his nebulizations refill of his medications.

## 2025-01-07 NOTE — TELEPHONE ENCOUNTER
Raúl Ospina RN  P [facility] Clinical Pharmacy Clinical Staff  Good Afternoon,    Patient was seen by his Dr. Alexander on 9/26/2024.  A portion of the plan of care is copied and pasted below:     Nicotine use disorder  -Patient has been smoking since he has been 6 years old and admits cutting down to about 3 to 5 cigarettes a day but has smoked 1 to 2 packs previously  -Has significant past medical history of lung cancer on the right upper lobe s/p resection  -Will offer dual nicotine replacement therapy including patch plus gum patient was agreeable    Writer enrolled Patient in care management yesterday.  He informed Writer that he did not receive his Nicotine gum and patches.      Writer would benefit from your assistance with smoking cessation.    Thank you,    Raúl

## 2025-01-08 ENCOUNTER — CARE COORDINATION (OUTPATIENT)
Dept: CARE COORDINATION | Age: 65
End: 2025-01-08

## 2025-01-08 ENCOUNTER — TELEPHONE (OUTPATIENT)
Dept: ONCOLOGY | Age: 65
End: 2025-01-08

## 2025-01-08 NOTE — CARE COORDINATION
Ambulatory Care Coordination Note     2025 8:33 AM        DAVONTE Sanchez, [facility],  contacted the ACM by telephone. Verified name and  with  Laura  as identifiers.       ACM: Raúl Ospina RN     Challenges to be reviewed by the provider   Additional needs identified to be addressed with provider No  none               Method of communication with provider: staff message.    Utilization: Has the patient been seen in the ED since your last call? no    Care Summary Note:     Writer received return call from DAVONTE Sanchez, [facility], today.  She was informed Patient has a new phone.  Writer informed Patient that she attempted to contact him.  She was informed Patient has a colonoscopy scheduled this week on Friday.  He has an appointment with Dr. Michel scheduled next week on 1/15/2025.    Offered patient enrollment in the Remote Patient Monitoring (RPM) program for in-home monitoring: Deferred at this time because Not discussed with Patient; will discuss at next outreach.     Assessments Completed:   N/A    Medications Reviewed:   Not completed during this call:      Advance Care Planning:   Not reviewed during this call     Care Planning:   Not completed during this call    PCP/Specialist follow up:   Future Appointments         Provider Specialty Dept Phone    1/10/2025 3:30 PM SCHEDULE, [facility] CLINICAL PHARMACY Pharmacy [phone]    1/15/2025 2:00 PM SCHEDULE, [facility] RESPIRATORY SPEC Pulmonology [phone]            Follow Up:   Plan for next AC outreach in approximately 1 week to complete:  - disease specific assessments  - SDOH assessments  - medication review   - advance care planning   - goal progression  - follow up appointment with Colonoscopy scheduled on 1/10/2025; Pulmonology appointment on 1/15/2025 .   Patient  is agreeable to this plan.

## 2025-01-08 NOTE — CARE COORDINATION
HC received from Raúl Ospina/[facility], who stated that the patient   is experiencing a lot of difficulties with his health, finances and housing.  HC reached out to the patient today and engaged in conversation with   him regarding his circumstances.  Patient stated that on today, he talked   to Social Security and was able to get an understanding regarding his finances.  Patient stated that he feels better about that.  HC inquired if the patient had  been to family court to get paperwork for filing for joint custody of his son.   Patient stated that he has  the paperwork and was told that the cost to file  for joint custody is $195. And then he can file for a court hearing.  Patient  was appalled by the cost, and can't figure out how to get the money.  HC  will research possibilities for funds to assist in this situation.  HC also shared   with the patient that she will make contact with a former [employer] employee for  advice for getting out of his current housing situation and into a healthier   setting with his family.    Plan of Care  HC will attempt to get back with  patient by 01/10/25, with whatever information obtained

## 2025-01-08 NOTE — CARE COORDINATION
Pulmonology [phone]    1/16/2025 11:00 AM (Arrive by 10:45 AM) [facility] VASCULAR [facility] 1 Vascular Lab [phone]    1/16/2025 12:00 PM (Arrive by 11:45 AM) [facility] Radiology [phone]    1/16/2025 1:00 PM [facility] PFT RM 1 Pulmonary Function Testing [phone]            Follow Up:   Plan for next [facility] outreach in approximately 1 week to complete:  - disease specific assessments  - SDOH assessments  - medication review   - advance care planning   - goal progression  - education .   Patient  is agreeable to this plan.

## 2025-01-08 NOTE — TELEPHONE ENCOUNTER
Lung Navigator received call from care coordinator Valorie and pt. As new phone. Writer sharing with Pulmonary office and they were aware and pt. Is scheduled soon.

## 2025-01-09 ENCOUNTER — CARE COORDINATION (OUTPATIENT)
Dept: CARE COORDINATION | Age: 65
End: 2025-01-09

## 2025-01-09 RX ORDER — SODIUM, POTASSIUM,MAG SULFATES 17.5-3.13G
1 SOLUTION, RECONSTITUTED, ORAL ORAL ONCE
Qty: 1 EACH | Refills: 0 | Status: SHIPPED | OUTPATIENT
Start: 2025-01-09 | End: 2025-01-09

## 2025-01-09 NOTE — CARE COORDINATION
HC phoned and left a message for Ashly Patel/former [facility] .  There was no answer, HC left a message for Ashly Patel and provided   contact information for a return call to the HC.    Plan of Care  HC will follow up with Ms. Patel if no response

## 2025-01-09 NOTE — CARE COORDINATION
Assessment/Plan:       dermatitis, patient was given prescription for triamcinolone  There are no Patient Instructions on file for this visit  Return in about 1 year (around 9/4/2019)  Subjective:      Patient ID: Cynthia Tripp is a 4 y o  female  Chief Complaint   Patient presents with    Well Child       She is here today for wellness exam but she complained of rash on her upper extremities bilateral started 3 days ago it she but no signs of infection  The following portions of the patient's history were reviewed and updated as appropriate: allergies, current medications, past family history, past medical history, past social history, past surgical history and problem list     Review of Systems   Constitutional: Negative for fatigue and fever  HENT: Negative for sore throat  Eyes: Negative for redness and itching  Respiratory: Negative for cough and shortness of breath  Skin: Positive for rash ( rash on the upper extremities bilateral)  Hematological: Negative for adenopathy  Current Outpatient Prescriptions   Medication Sig Dispense Refill    triamcinolone (KENALOG) 0 5 % cream Apply topically 3 (three) times a day 30 g 0     No current facility-administered medications for this visit  Objective:    /60 (BP Location: Left arm, Patient Position: Sitting, Cuff Size: Adult)   Pulse 61   Temp 97 9 °F (36 6 °C) (Tympanic)   Resp 16   Ht 5' 4" (1 626 m)   Wt 55 6 kg (122 lb 9 6 oz)   BMI 21 04 kg/m²        Physical Exam   Constitutional: She appears well-developed  She is active  HENT:   Head: Atraumatic  Right Ear: Tympanic membrane normal    Left Ear: Tympanic membrane normal    Mouth/Throat: Mucous membranes are moist    Eyes: Conjunctivae are normal  Pupils are equal, round, and reactive to light  Neck: Normal range of motion  No neck adenopathy  Cardiovascular: Regular rhythm      Pulmonary/Chest: Effort normal and breath sounds normal  HC will follow up with patient on 01/10/25, if no response call   from Ms. Ashly Patel, former [facility], .      Plan of Care  HC will make contact to update regarding contact made or not   Neurological: She is alert  Skin: Rash (Erythematous papular rash on upper extremities bilateral   No signs of infection ) noted                Evi Donis MD

## 2025-01-10 ENCOUNTER — CARE COORDINATION (OUTPATIENT)
Dept: CARE COORDINATION | Age: 65
End: 2025-01-10

## 2025-01-10 ENCOUNTER — TELEPHONE (OUTPATIENT)
Dept: PHARMACY | Facility: CLINIC | Age: 65
End: 2025-01-10

## 2025-01-10 ENCOUNTER — HOSPITAL ENCOUNTER (OUTPATIENT)
Age: 65
Setting detail: OUTPATIENT SURGERY
Discharge: HOME | End: 2025-01-10
Attending: INTERNAL MEDICINE | Admitting: INTERNAL MEDICINE
Payer: MEDICARE

## 2025-01-10 ENCOUNTER — ANESTHESIA EVENT (OUTPATIENT)
Dept: OPERATING ROOM | Age: 65
End: 2025-01-10
Payer: MEDICARE

## 2025-01-10 ENCOUNTER — ANESTHESIA (OUTPATIENT)
Dept: OPERATING ROOM | Age: 65
End: 2025-01-10
Payer: MEDICARE

## 2025-01-10 VITALS
HEIGHT: 72 IN | RESPIRATION RATE: 16 BRPM | DIASTOLIC BLOOD PRESSURE: 79 MMHG | TEMPERATURE: 96.8 F | OXYGEN SATURATION: 99 % | WEIGHT: 183 LBS | BODY MASS INDEX: 24.79 KG/M2 | SYSTOLIC BLOOD PRESSURE: 136 MMHG | HEART RATE: 63 BPM

## 2025-01-10 DIAGNOSIS — D12.6 TUBULAR ADENOMA OF COLON: ICD-10-CM

## 2025-01-10 LAB — GLUCOSE BLD-MCNC: 116 MG/DL (ref 75–110)

## 2025-01-10 PROCEDURE — 7100000011 HC PHASE II RECOVERY - ADDTL 15 MIN: Performed by: INTERNAL MEDICINE

## 2025-01-10 PROCEDURE — 45380 COLONOSCOPY AND BIOPSY: CPT | Performed by: INTERNAL MEDICINE

## 2025-01-10 PROCEDURE — 7100000010 HC PHASE II RECOVERY - FIRST 15 MIN: Performed by: INTERNAL MEDICINE

## 2025-01-10 PROCEDURE — 82947 ASSAY GLUCOSE BLOOD QUANT: CPT

## 2025-01-10 PROCEDURE — 3700000000 HC ANESTHESIA ATTENDED CARE: Performed by: INTERNAL MEDICINE

## 2025-01-10 PROCEDURE — 45385 COLONOSCOPY W/LESION REMOVAL: CPT | Performed by: INTERNAL MEDICINE

## 2025-01-10 PROCEDURE — 6360000002 HC RX W HCPCS

## 2025-01-10 PROCEDURE — 2580000003 HC RX 258: Performed by: ANESTHESIOLOGY

## 2025-01-10 PROCEDURE — 88305 TISSUE EXAM BY PATHOLOGIST: CPT

## 2025-01-10 PROCEDURE — 2709999900 HC NON-CHARGEABLE SUPPLY: Performed by: INTERNAL MEDICINE

## 2025-01-10 PROCEDURE — 3700000001 HC ADD 15 MINUTES (ANESTHESIA): Performed by: INTERNAL MEDICINE

## 2025-01-10 PROCEDURE — 3609010600 HC COLONOSCOPY POLYPECTOMY SNARE/COLD BIOPSY: Performed by: INTERNAL MEDICINE

## 2025-01-10 RX ORDER — LIDOCAINE HYDROCHLORIDE 10 MG/ML
1 INJECTION, SOLUTION EPIDURAL; INFILTRATION; INTRACAUDAL; PERINEURAL
Status: DISCONTINUED | OUTPATIENT
Start: 2025-01-10 | End: 2025-01-10 | Stop reason: HOSPADM

## 2025-01-10 RX ORDER — SODIUM CHLORIDE, SODIUM LACTATE, POTASSIUM CHLORIDE, CALCIUM CHLORIDE 600; 310; 30; 20 MG/100ML; MG/100ML; MG/100ML; MG/100ML
INJECTION, SOLUTION INTRAVENOUS CONTINUOUS
Status: DISCONTINUED | OUTPATIENT
Start: 2025-01-10 | End: 2025-01-10 | Stop reason: HOSPADM

## 2025-01-10 RX ORDER — SODIUM CHLORIDE 0.9 % (FLUSH) 0.9 %
5-40 SYRINGE (ML) INJECTION EVERY 12 HOURS SCHEDULED
Status: DISCONTINUED | OUTPATIENT
Start: 2025-01-10 | End: 2025-01-10 | Stop reason: HOSPADM

## 2025-01-10 RX ORDER — LIDOCAINE HYDROCHLORIDE 20 MG/ML
INJECTION, SOLUTION EPIDURAL; INFILTRATION; INTRACAUDAL; PERINEURAL
Status: DISCONTINUED | OUTPATIENT
Start: 2025-01-10 | End: 2025-01-10 | Stop reason: SDUPTHER

## 2025-01-10 RX ORDER — SODIUM CHLORIDE 9 MG/ML
INJECTION, SOLUTION INTRAVENOUS PRN
Status: DISCONTINUED | OUTPATIENT
Start: 2025-01-10 | End: 2025-01-10 | Stop reason: HOSPADM

## 2025-01-10 RX ORDER — PROPOFOL 10 MG/ML
INJECTION, EMULSION INTRAVENOUS
Status: DISCONTINUED | OUTPATIENT
Start: 2025-01-10 | End: 2025-01-10 | Stop reason: SDUPTHER

## 2025-01-10 RX ORDER — SODIUM CHLORIDE 0.9 % (FLUSH) 0.9 %
5-40 SYRINGE (ML) INJECTION PRN
Status: DISCONTINUED | OUTPATIENT
Start: 2025-01-10 | End: 2025-01-10 | Stop reason: HOSPADM

## 2025-01-10 RX ORDER — SODIUM CHLORIDE 9 MG/ML
INJECTION, SOLUTION INTRAVENOUS CONTINUOUS
Status: DISCONTINUED | OUTPATIENT
Start: 2025-01-10 | End: 2025-01-10 | Stop reason: HOSPADM

## 2025-01-10 RX ADMIN — PROPOFOL 20 MG: 10 INJECTION, EMULSION INTRAVENOUS at 12:32

## 2025-01-10 RX ADMIN — LIDOCAINE HYDROCHLORIDE 50 MG: 20 INJECTION, SOLUTION EPIDURAL; INFILTRATION; INTRACAUDAL; PERINEURAL at 11:51

## 2025-01-10 RX ADMIN — SODIUM CHLORIDE: 9 INJECTION, SOLUTION INTRAVENOUS at 10:28

## 2025-01-10 RX ADMIN — PROPOFOL 100 MCG/KG/MIN: 10 INJECTION, EMULSION INTRAVENOUS at 11:52

## 2025-01-10 RX ADMIN — PROPOFOL 10 MG: 10 INJECTION, EMULSION INTRAVENOUS at 12:29

## 2025-01-10 RX ADMIN — PROPOFOL 20 MG: 10 INJECTION, EMULSION INTRAVENOUS at 12:21

## 2025-01-10 RX ADMIN — PROPOFOL 20 MG: 10 INJECTION, EMULSION INTRAVENOUS at 12:25

## 2025-01-10 RX ADMIN — PROPOFOL 10 MG: 10 INJECTION, EMULSION INTRAVENOUS at 12:15

## 2025-01-10 RX ADMIN — PROPOFOL 20 MG: 10 INJECTION, EMULSION INTRAVENOUS at 12:19

## 2025-01-10 RX ADMIN — PROPOFOL 20 MG: 10 INJECTION, EMULSION INTRAVENOUS at 12:22

## 2025-01-10 RX ADMIN — PROPOFOL 50 MG: 10 INJECTION, EMULSION INTRAVENOUS at 11:51

## 2025-01-10 ASSESSMENT — ENCOUNTER SYMPTOMS: SHORTNESS OF BREATH: 1

## 2025-01-10 ASSESSMENT — LIFESTYLE VARIABLES: SMOKING_STATUS: 1

## 2025-01-10 ASSESSMENT — PAIN - FUNCTIONAL ASSESSMENT: PAIN_FUNCTIONAL_ASSESSMENT: 0-10

## 2025-01-10 NOTE — H&P
(GLUCOPHAGE-XR) 500 MG extended release tablet Take 1 tablet by mouth daily (with breakfast) 5/31/24  Yes Shirin Coe MD   atorvastatin (LIPITOR) 20 MG tablet Take 1 tablet by mouth daily 1/7/25   MoyeNakia rm MD   nicotine (NICODERM CQ) 14 MG/24HR Place 1 patch onto the skin every 24 hours 1/7/25 2/6/25  Nakia Alexander MD   nicotine polacrilex (NICORETTE) 2 MG gum Take 1 each by mouth as needed for Smoking cessation 1/7/25   MoNakia goetz MD   vitamin D (ERGOCALCIFEROL) 1.25 MG (47870 UT) CAPS capsule Take 1 capsule by mouth once a week 1/7/25   Nakia Alexander MD   DULoxetine (CYMBALTA) 30 MG extended release capsule Take 1 capsule by mouth daily 9/26/24   MoNakia goetz MD   vitamin D3 (CHOLECALCIFEROL) 25 MCG (1000 UT) TABS tablet Take 1 tablet by mouth daily  Patient not taking: Reported on 1/6/2025 9/9/24   Nakia Alexander MD        Allergies:     No known allergies    Social History:     Tobacco:    reports that he has been smoking cigarettes. He has a 27 pack-year smoking history. He has been exposed to tobacco smoke. He has never used smokeless tobacco.  Alcohol:      reports no history of alcohol use.  Drug Use:  reports current drug use. Drug: Marijuana (Weed).    Family History:     Family History   Problem Relation Age of Onset    Cancer Mother         lung    Cancer Brother         lung    Cancer Maternal Grandmother         lung    Cancer Maternal Grandfather         lung       Review of Systems:     Positive and Negative as described in HPI.    CONSTITUTIONAL: negative for fevers, chills, sweats, fatigue, weight loss  HEENT: Dentures - full. Glasses. negative for hearing changes, runny nose, throat pain  RESPIRATORY: Hx right lung adenocarcinoma s/p right upper lobectomy. negative for shortness of breath, cough, congestion, wheezing.  CARDIOVASCULAR: negative for chest pain, palpitations.  GASTROINTESTINAL: See HPI  GENITOURINARY: negative for difficulty of urination, burning with  any previous visit (from the past 24 hour(s)).    No results for input(s): \"HGB\", \"HCT\", \"WBC\", \"MCV\", \"PLATELET\", \"NA\", \"K\", \"CL\", \"CO2\", \"BUN\", \"CREATININE\", \"GLUCOSE\", \"INR\", \"PROTIME\", \"APTT\", \"AST\", \"ALT\", \"LABALBU\", \"HCG\" in the last 720 hours.    No results for input(s): \"COVID19\" in the last 720 hours.  Imaging/Diagnostics:    No results found.    Diagnosis:      Tubular adenoma of colon    Plans:     1. COLONOSCOPY DIAGNOSTIC      CARITO Hyman - CNP  1/10/2025  10:20 AM

## 2025-01-10 NOTE — DISCHARGE INSTRUCTIONS
POST COLONOSCOPY   1. ACTIVITY   No driving, operating machinery, signing legal papers, or making important decisions for 24 hours   Resume normal activity after 24 hours   You may return to work after 24 hours.      2. DIET  _____  Diet modification: No     3. MEDICATIONS   Do not consume alcohol, tranquilizers or sleeping medications for 24 hours unless otherwise advised by your physician.   Resume your usual medications unless otherwise instructed.      4. NORMAL CHANGES YOU MAY EXPERIENCE AFTER ENDOSCOPY:    From the Colonoscopy:   Passing gas for several hours is normal   Some mild abdominal cramping is normal   If a biopsy/polypectomy was done, you may see some spotting of blood   You may feel tired or worn out for the next 24-48 hours due to the prep and sedation         5. CALL YOUR PHYSICIAN IF YOU EXPERIENCE ANY OF THE FOLLOWING   Vomiting blood or passing blood rectally (color may be red or black)   Severe abdominal pain or tenderness (that is not relieved by passing air)   Fever, chills, or excessive sweating   Persistent nausea or vomiting   Redness or swelling at the IV site      6. ADDITIONAL INSTRUCTIONS      IF YOU HAVE ANY QUESTIONS OR CONCERNS PLEASE CALL YOUR DOCTOR,  IF YOU FEEL YOU HAVE A MEDICAL EMERGENCY PLEASE DIAL [phone]

## 2025-01-10 NOTE — CARE COORDINATION
HC attempted to reach out to Ashly Patel/Former [facility] ,  and left a second message for her, to get information to assist this patient,  with his housing issues.  HC was about to call the patient to inform that   she hasn't been able to reach the former [facility] .  HC   noticed that the patient is in the hospital at this time.      Plan of Care  HC will attempt to reach out to patient on   01/14/25.
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
## 2025-01-10 NOTE — OP NOTE
PROCEDURE NOTE    DATE OF PROCEDURE: 1/10/2025    SURGEON: Fernando Escoto MD  Facility : [facility]  ASSISTANT: None  Anesthesia: MAC  PREOPERATIVE DIAGNOSIS:   History of adenoma polyps    POSTOPERATIVE DIAGNOSIS: as described below    OPERATION: Total colonoscopy     ANESTHESIA: Moderate Sedation    ESTIMATED BLOOD LOSS: less than 50     COMPLICATIONS: None.     SPECIMENS:  Was Obtained:   As below        HISTORY: The patient is a 64 y.o. year old male with history of above preop diagnosis.  I recommended colonoscopy with possible biopsy or polypectomy and I explained the risk, benefits, expected outcome, and alternatives to the procedure.  Risks included but are not limited to bleeding, infection, respiratory distress, hypotension, and perforation of the colon and possibility of missing a lesion.  The patient understands and is in agreement.        The patient was counseled at length about the risks of gagan Covid-19 during their perioperative period and any recovery window from their procedure.  The patient was made aware that gagan Covid-19  may worsen their prognosis for recovering from their procedure  and lend to a higher morbidity and/or mortality risk.  All material risks, benefits, and reasonable alternatives including postponing the procedure were discussed. The patient does wish to proceed with the procedure at this time.       PROCEDURE: The patient was given IV conscious sedation.  The patient's SPO2 remained above 90% throughout the procedure.     The colonoscope was inserted per rectum and advanced under direct vision to the cecum without difficulty.      Post sedation note :The patient's SPO2 remained above 90% throughout the procedure.the vital signs remained stable , and no immediate complication form the procedure noted, patient will be ready for d/c when criteria is met .        The prep was fair.      Findings:    Multiple right colon polyps removed with cold snare, they are

## 2025-01-14 ENCOUNTER — CARE COORDINATION (OUTPATIENT)
Dept: CARE COORDINATION | Age: 65
End: 2025-01-14

## 2025-01-14 NOTE — CARE COORDINATION
Patient called the HC this morning stating that he was told   that he no longer has any medical rides. HC understood   what was meant about his transportation and was able to   settle the patient.  He thought that he couldn't utilize the  services anymore.  HC reminded the patient that he can   get two round rides per month with [employer].  HC spoke to [employer], who stated that she would have the   patient transported to his appointments, for 01/15/25,   and 01/16/25.  This information was given to the patient.    Plan of Care  HC will get back with the patient at a later date and   share information about his health insurance that   offers medical transportation as well.  HC will also speak to the patient regarding his ACP  plans.

## 2025-01-15 ENCOUNTER — CARE COORDINATION (OUTPATIENT)
Dept: CARE COORDINATION | Age: 65
End: 2025-01-15

## 2025-01-15 ENCOUNTER — OFFICE VISIT (OUTPATIENT)
Dept: PULMONOLOGY | Age: 65
End: 2025-01-15

## 2025-01-15 VITALS
RESPIRATION RATE: 18 BRPM | BODY MASS INDEX: 26.14 KG/M2 | OXYGEN SATURATION: 96 % | HEIGHT: 72 IN | SYSTOLIC BLOOD PRESSURE: 152 MMHG | WEIGHT: 193 LBS | HEART RATE: 73 BPM | DIASTOLIC BLOOD PRESSURE: 97 MMHG

## 2025-01-15 DIAGNOSIS — Z87.891 HISTORY OF SMOKING 30 OR MORE PACK YEARS: ICD-10-CM

## 2025-01-15 DIAGNOSIS — R91.8 PULMONARY INFILTRATE: ICD-10-CM

## 2025-01-15 DIAGNOSIS — J44.9 CHRONIC OBSTRUCTIVE PULMONARY DISEASE, UNSPECIFIED COPD TYPE (HCC): ICD-10-CM

## 2025-01-15 DIAGNOSIS — R91.1 PULMONARY NODULE: Primary | ICD-10-CM

## 2025-01-15 DIAGNOSIS — Z85.118 HISTORY OF LUNG CANCER: ICD-10-CM

## 2025-01-15 LAB
DLCO %PRED: NORMAL
DLCO PRED: NORMAL
DLCO/VA %PRED: NORMAL
DLCO/VA PRED: NORMAL
DLCO/VA: NORMAL
DLCO: NORMAL
EXPIRATORY TIME-POST: NORMAL
EXPIRATORY TIME: NORMAL
FEF 25-75 %CHNG: NORMAL
FEF 25-75 POST %PRED: NORMAL
FEF 25-75% %PRED-PRE: NORMAL
FEF 25-75% PRED: NORMAL
FEF 25-75-POST: NORMAL
FEF 25-75-PRE: NORMAL
FEV1 %PRED-POST: NORMAL
FEV1 %PRED-PRE: NORMAL
FEV1 PRED: NORMAL
FEV1-POST: NORMAL
FEV1-PRE: NORMAL
FEV1/FVC %PRED-POST: NORMAL
FEV1/FVC %PRED-PRE: NORMAL
FEV1/FVC PRED: NORMAL
FEV1/FVC-POST: NORMAL
FEV1/FVC-PRE: NORMAL
FVC %PRED-POST: NORMAL
FVC %PRED-PRE: NORMAL
FVC PRED: NORMAL
FVC-POST: NORMAL
FVC-PRE: NORMAL
GAW %PRED: NORMAL
GAW PRED: NORMAL
GAW: NORMAL
IC PRE %PRED: NORMAL
IC PRED: NORMAL
IC: NORMAL
MEP: NORMAL
MIP: NORMAL
MVV %PRED-PRE: NORMAL
MVV PRED: NORMAL
MVV-PRE: NORMAL
PEF %PRED-POST: NORMAL
PEF %PRED-PRE: NORMAL
PEF PRED: NORMAL
PEF%CHNG: NORMAL
PEF-POST: NORMAL
PEF-PRE: NORMAL
RAW %PRED: NORMAL
RAW PRED: NORMAL
RAW: NORMAL
RV PRE %PRED: NORMAL
RV PRED: NORMAL
RV: NORMAL
SURGICAL PATHOLOGY REPORT: NORMAL
SVC %PRED: NORMAL
SVC PRED: NORMAL
SVC: NORMAL
TLC PRE %PRED: NORMAL
TLC PRED: NORMAL
TLC: NORMAL
VA %PRED: NORMAL
VA PRED: NORMAL
VA: NORMAL
VTG %PRED: NORMAL
VTG PRED: NORMAL
VTG: NORMAL

## 2025-01-15 RX ORDER — BUDESONIDE AND FORMOTEROL FUMARATE DIHYDRATE 160; 4.5 UG/1; UG/1
2 AEROSOL RESPIRATORY (INHALATION) 2 TIMES DAILY
Qty: 10.2 G | Refills: 3 | Status: SHIPPED | OUTPATIENT
Start: 2025-01-15

## 2025-01-15 RX ORDER — ALBUTEROL SULFATE 90 UG/1
2 INHALANT RESPIRATORY (INHALATION) EVERY 6 HOURS PRN
Qty: 18 G | Refills: 5 | Status: SHIPPED | OUTPATIENT
Start: 2025-01-15

## 2025-01-15 NOTE — PROGRESS NOTES
OUTPATIENT PULMONARY CONSULT NOTE      Patient:  Vic Nunez  MRN: [id number]    Consulting Physician: Nakia Alexander MD  Reason for Consult: Abnormal CT scan/lung nodule/pulm infiltrate  Primacy Care Physician: Nakia Alexander MD    HISTORY OF PRESENT ILLNESS:     History of Present Illness  The patient is a 64-year-old male who presents for evaluation of COPD and abnormal CT scan with lung nodule and pulm infiltrate with history of lung cancer.    He underwent a CT scan in December on 12/09/2024  apparently for screening.  CT scan was reported as abnormal suspicious for malignancy shows right lower lobe lung infiltrate/consolidation/mass and left upper lobe nodule.  According to patient he is scheduled for a follow-up CT scan tomorrow by primary physician.  He has no known diagnosis of COPD or asthma but acknowledges respiratory issues for many years. He experiences shortness of breath after minimal exertion, a symptom that has persisted since his lung surgery. He reports no difficult persistent cough or sputum production but admits to occasional coughing episodes. He also reports waking up at night due to shortness of breath and time wheezing. His appetite remains good and denies weight loss. He has an albuterol inhaler at home, which he uses during episodes of wheezing. He reports no febrile symptoms night sweats chest pain hemoptysis.     In 2010, he was diagnosed with lung cancer, necessitating the removal of his right upper lobe and a third of his lung per patient, along with lymph nodes (no records available). Despite this significant medical history, he has not followed up with his oncologist apparently after surgery partly because of transportation issues per patient.     He has a history of smoking, having recently quit 2 days ago. Prior to this, he was consuming 3 to 4 cigarettes daily, a reduction from his previous habit of 2 packs per day. He initiated smoking at the age of 6.    SOCIAL

## 2025-01-15 NOTE — CARE COORDINATION
Tawanna from [employer] phoned the HC,  stating that she was concerned that the patient's  appointment for today was at [facility].  HC reviewed   the patient's appointment desk and also contacted  Raúl Ospina/JASON, who had scheduled the   appointments.  End result, Tawanna[employer] stated that she contacted [facility]   Pulmonology and was told that patient's appointment  will be @ [facility]'s.    Plan of Care  HC will follow up with patient about upcoming  appointments

## 2025-01-16 ENCOUNTER — CARE COORDINATION (OUTPATIENT)
Dept: CARE COORDINATION | Age: 65
End: 2025-01-16

## 2025-01-16 ENCOUNTER — HOSPITAL ENCOUNTER (OUTPATIENT)
Dept: PULMONOLOGY | Age: 65
End: 2025-01-16
Payer: MEDICARE

## 2025-01-16 ENCOUNTER — HOSPITAL ENCOUNTER (OUTPATIENT)
Age: 65
Discharge: HOME | End: 2025-01-18
Payer: MEDICARE

## 2025-01-16 ENCOUNTER — APPOINTMENT (OUTPATIENT)
Dept: CT IMAGING | Age: 65
End: 2025-01-16
Payer: MEDICARE

## 2025-01-16 ENCOUNTER — HOSPITAL ENCOUNTER (OUTPATIENT)
Dept: VASCULAR LAB | Age: 65
Discharge: HOME | End: 2025-01-18
Payer: MEDICARE

## 2025-01-16 DIAGNOSIS — Z72.0 TOBACCO ABUSE: ICD-10-CM

## 2025-01-16 DIAGNOSIS — Z13.6 ENCOUNTER FOR ABDOMINAL AORTIC ANEURYSM (AAA) SCREENING: ICD-10-CM

## 2025-01-16 DIAGNOSIS — R91.1 LUNG NODULE SEEN ON IMAGING STUDY: ICD-10-CM

## 2025-01-16 LAB
EGFR, POC: 83 ML/MIN/1.73M2
POC CREATININE: 1 MG/DL (ref 0.51–1.19)

## 2025-01-16 PROCEDURE — 82565 ASSAY OF CREATININE: CPT

## 2025-01-16 PROCEDURE — 71260 CT THORAX DX C+: CPT

## 2025-01-16 PROCEDURE — 76706 US ABDL AORTA SCREEN AAA: CPT

## 2025-01-16 PROCEDURE — 6360000004 HC RX CONTRAST MEDICATION

## 2025-01-16 RX ORDER — IOPAMIDOL 755 MG/ML
75 INJECTION, SOLUTION INTRAVASCULAR
Status: COMPLETED | OUTPATIENT
Start: 2025-01-16 | End: 2025-01-16

## 2025-01-16 RX ADMIN — IOPAMIDOL 75 ML: 755 INJECTION, SOLUTION INTRAVENOUS at 11:04

## 2025-01-16 NOTE — CARE COORDINATION
HC contacted the patient and followed up on his transportation through   the [employer].  Patient stated that everything went   well.  HC shared with the patient that he has [employer] complete, which   gives him a lot of medical rides.  HC texted the patient the phone number  for medical transportation, to inquire of how many rides he is eligible for.   Patient mentioned that he has an upcoming appointment in February to   [place] .  He was very concerned about having to go to [place]  and not having transportation out there.  HC assured patient that he will   have transportation to most places with his insurance through [employer].  Patient stated that he will make the call tomorrow to check   On the amount of rides that he's allowed.    HC had a brief conversation with the patient about the ACP.  Patient confirmed that he does have the document, and will  be thinking of who he would like to act as his HCDM's.  Patient and HC will revisit the ACP and plan a conversation  regarding the  process and the paperwork.  Patient agreed to this plan.    Plan of Care  HC will follow up with patient in the next week

## 2025-01-16 NOTE — PROGRESS NOTES
Pulmonary function test.  Date of study 01/15/2025.      Spirometry shows FVC is 2.88 68% predicted.  FEV1 is 1.47 46% predicted consistent with moderate obstructive ventilatory impairment.  Postbronchodilator there is significant improvement in both FEV1 and FVC postbronchodilator FEV1 was 59% predicted with 30% improvement consistent with bronchospastic/elevated responsiveness.  Lung volumes shows residual volume was 2.50 103% predicted total lung capacity 6.09 92% predicted both are within normal limit.  Diffusion capacity was 19.62 66% predicted consistent with mild reduction diffusion capacity likely secondary to previous gastric send surface area from emphysema, pulm vascular disease, anemia or intraparenchymal lung disease.      Impression:      This pulmonary function test is consistent with moderate obstructive ventilatory impairment with significant response to bronchodilator, lung volumes were normal, there is mild reduction diffusion capacity which is likely secondary to emphysema.  Clinical correlation is recommended

## 2025-01-17 LAB
VAS AORTA DIST AP: 1.36 CM
VAS AORTA DIST PSV: 123 CM/S
VAS AORTA DIST TR: 1.26 CM
VAS AORTA INFRARENAL PSV: 62.4 CM/S
VAS AORTA MID AP: 1.7 CM
VAS AORTA MID PSV: 70.7 CM/S
VAS AORTA MID TRANS: 1.82 CM
VAS AORTA PROX AP: 1.7 CM
VAS AORTA PROX PSV: 78.6 CM/S
VAS AORTA PROX TR: 1.95 CM
VAS AORTA SUPRARENAL PSV: 64.1 CM/S
VAS LEFT COM ILIAC PROX PSV: 185 CM/S
VAS RIGHT COM ILIAC AP: 0.7 CM
VAS RIGHT COM ILIAC PROX PSV: 150 CM/S
VAS RIGHT COM ILIAC TRANS: 0.66 CM

## 2025-01-20 DIAGNOSIS — R91.1 LUNG NODULE: Primary | ICD-10-CM

## 2025-01-20 NOTE — PROGRESS NOTES
I reviewed the new CT scan of the chest done on 01/16/2025 and is compared to CT scan of the chest on 12/09/2024 although right lower lobe consolidation is slightly smaller but is still persistent left upper lobe opacity is stable.  Please schedule PET scan.  I saw him in the office on 01/15/2025 just for 5 days ago according to my note come back in 3 weeks please see if PET scan can be scheduled before his visit and follow-up in the office.  PET scan is ordered.

## 2025-01-21 ENCOUNTER — TELEPHONE (OUTPATIENT)
Dept: PULMONOLOGY | Age: 65
End: 2025-01-21

## 2025-01-21 NOTE — TELEPHONE ENCOUNTER
----- Message from Dr. Yan Xiao MD sent at 1/20/2025  5:16 PM EST -----  I reviewed the new CT scan of the chest done on 01/16/2025 and is compared to CT scan of the chest on 12/09/2024 although right lower lobe consolidation is slightly smaller but is still persistent left upper lobe opacity is stable.  Please schedule PET scan.  I saw him in the office on 01/15/2025 just for 5 days ago according to my note come back in 3 weeks please see if PET scan can be scheduled before his visit and follow-up in the office.  PET scan is ordered.  ----- Message -----  From: Angely Rodriguez MA  Sent: 1/16/2025   1:20 PM EST  To: Yan Xiao MD    Here is the CT Scan you wanted on this patient.

## 2025-01-21 NOTE — TELEPHONE ENCOUNTER
PATIENT called and informed of Dr. Xiao's message.  PET Scan scheduled and date,time, location(address), PET instructions all provided to patient in great detail.  Also scheduling phone number incase the patient has to change the date and time.  Patient verbalized understanding everything provided to him.

## 2025-01-22 ENCOUNTER — CARE COORDINATION (OUTPATIENT)
Dept: CARE COORDINATION | Age: 65
End: 2025-01-22

## 2025-01-22 NOTE — CARE COORDINATION
Dr. Xiao's plan of care.    Educate reporting changes in condition, taught by Raúl sOpina RN at 1/23/2025  9:19 AM.  Learner: Patient  Readiness: Acceptance  Method: Explanation  Response: Verbalizes Understanding  Comment: Patient has completed numerous test.  He expressed understanding of results of his Colonoscopy.  He expressed understanding of Dr. Xiao's plan of care.    Educate Patient on When to Call for Symptoms, taught by Raúl Ospina RN at 1/23/2025  9:19 AM.  Learner: Patient  Readiness: Acceptance  Method: Explanation  Response: Verbalizes Understanding  Comment: Patient has completed numerous test.  He expressed understanding of results of his Colonoscopy.  He expressed understanding of Dr. Xiao's plan of care.    Education Comments  No comments found.     ,    Goals Addressed                   This Visit's Progress     Conditions and Symptoms   On track     I will schedule office visits, as directed by my provider.  I will keep my appointment or reschedule if I have to cancel.  I will notify my provider of any barriers to my plan of care.  I will follow my Zone Management tool to seek urgent or emergent care.  I will notify my provider of any symptoms that indicate a worsening of my condition.    Barriers: fear of failure, lack of motivation, financial, lack of support, overwhelmed by complexity of regimen, and lack of education  Plan for overcoming my barriers: Enrolled Patient in [facility]  Confidence: 2/10  Anticipated Goal Completion Date: 3/7/2025    1/23/2025:  Patient kept his appointment with Dr. Xiao on 1/15/2025.  His plan of care was reviewed and discussed with Patient.  He expressed understanding.    Patient completed his colonoscopy on 1/10/2025.  Awaiting pathology results.    Patient completed PFT test. Writer reviewed impression of PFT test. Patient also had a CT of the chest on 1/16/2025.  Impression was reviewed.       Patient is keeping appointments as scheduled.  Clinical

## 2025-01-23 ENCOUNTER — CARE COORDINATION (OUTPATIENT)
Dept: CARE COORDINATION | Age: 65
End: 2025-01-23

## 2025-01-23 ASSESSMENT — ENCOUNTER SYMPTOMS: DYSPNEA ASSOCIATED WITH: MINIMAL EXERTION

## 2025-01-24 ENCOUNTER — HOSPITAL ENCOUNTER (OUTPATIENT)
Dept: NUCLEAR MEDICINE | Age: 65
Discharge: HOME | End: 2025-01-26
Attending: INTERNAL MEDICINE
Payer: MEDICARE

## 2025-01-24 ENCOUNTER — CARE COORDINATION (OUTPATIENT)
Dept: CARE COORDINATION | Age: 65
End: 2025-01-24

## 2025-01-24 DIAGNOSIS — R91.1 LUNG NODULE: ICD-10-CM

## 2025-01-24 LAB — GLUCOSE BLD-MCNC: 88 MG/DL (ref 75–110)

## 2025-01-24 PROCEDURE — 78815 PET IMAGE W/CT SKULL-THIGH: CPT

## 2025-01-24 PROCEDURE — 2500000003 HC RX 250 WO HCPCS: Performed by: INTERNAL MEDICINE

## 2025-01-24 PROCEDURE — 82947 ASSAY GLUCOSE BLOOD QUANT: CPT

## 2025-01-24 PROCEDURE — 3430000000 HC RX DIAGNOSTIC RADIOPHARMACEUTICAL: Performed by: INTERNAL MEDICINE

## 2025-01-24 PROCEDURE — A9609 HC RX DIAGNOSTIC RADIOPHARMACEUTICAL: HCPCS | Performed by: INTERNAL MEDICINE

## 2025-01-24 RX ORDER — SODIUM CHLORIDE 0.9 % (FLUSH) 0.9 %
10 SYRINGE (ML) INJECTION PRN
Status: DISCONTINUED | OUTPATIENT
Start: 2025-01-24 | End: 2025-01-27 | Stop reason: HOSPADM

## 2025-01-24 RX ORDER — FLUDEOXYGLUCOSE F 18 200 MCI/ML
10 INJECTION, SOLUTION INTRAVENOUS
Status: COMPLETED | OUTPATIENT
Start: 2025-01-24 | End: 2025-01-24

## 2025-01-24 RX ADMIN — FLUDEOXYGLUCOSE F 18 11.5 MILLICURIE: 200 INJECTION, SOLUTION INTRAVENOUS at 14:26

## 2025-01-24 RX ADMIN — SODIUM CHLORIDE, PRESERVATIVE FREE 10 ML: 5 INJECTION INTRAVENOUS at 14:26
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
## 2025-01-24 NOTE — CARE COORDINATION
HC spoke with patient today to inform him of our need to contact  to contact [facility].  HC and patient made a conference  call to [facility] and left a message with both HC's and patients  name and contact number .  HC then suggested that the patient let  The HC know if he gets information from the  regarding  Patient's insurance.  Patient reported that the  phoned   Him back and informed him that he will receive an insurance card in  7 to 10 days.  Patient was given the member ID, to give at his appointments.  Member ID is : [facility] Medicare/ [facility]  [id number].      Plan of Care  Patient will contact HC if he receives a return call  from [facility].  HC will contact patient if call received from [employer]

## 2025-01-27 ENCOUNTER — CARE COORDINATION (OUTPATIENT)
Dept: CARE COORDINATION | Age: 65
End: 2025-01-27

## 2025-01-27 NOTE — CARE COORDINATION
HC phoned the patient to inquire of his conversation with the [employer].  Patient reported that he spoke with Ms. Nascimento on last Friday,01/24/25, and  shared with her information about the living conditions where he is living.  Patient stated that he had to submit pictures to [facility] regarding the  poor living conditions in the complex.  Patient stated that he plans to list   additional information to submit to [facility] and give the representative  a statement of the unfavorable conditions in the [facility] Apartments.      Plan of Care  HC will await a return call from the patient and will assist according to   his need at the time.

## 2025-01-29 RX ORDER — DULOXETIN HYDROCHLORIDE 30 MG/1
30 CAPSULE, DELAYED RELEASE ORAL DAILY
Qty: 90 CAPSULE | Refills: 0 | Status: SHIPPED | OUTPATIENT
Start: 2025-01-29

## 2025-01-29 NOTE — TELEPHONE ENCOUNTER
Writer attempted to reach patient to schedule an appointment. Voicemail is not set up. Writer sent [employer] message.         Please address the medication refill and close the encounter.  If I can be of assistance, please route to the applicable pool.      Thank you.      Last visit: 9-  Last Med refill: 9-  Does patient have enough medication for 72 hours: No:     Next Visit Date:  Future Appointments   Date Time Provider Department Center   2/10/2025  1:30 PM Yan Xiao MD Resp Spec [id number]       Health Maintenance   Topic Date Due    Shingles vaccine (1 of 2) Never done    Respiratory Syncytial Virus (RSV) Pregnant or age 60 yrs+ (1 - Risk 60-74 years 1-dose series) Never done    Lipids  07/23/2021    COVID-19 Vaccine (2 - 2023-24 season) 09/01/2024    Depression Monitoring  05/31/2025    Annual Wellness Visit (Medicare)  06/01/2025    A1C test (Diabetic or Prediabetic)  09/26/2025    DTaP/Tdap/Td vaccine (2 - Td or Tdap) 12/01/2026    Colorectal Cancer Screen  01/10/2035    Flu vaccine  Completed    Pneumococcal 65+ years Vaccine  Completed    AAA screen  Completed    Hepatitis C screen  Completed    Hepatitis A vaccine  Aged Out    Hepatitis B vaccine  Aged Out    Hib vaccine  Aged Out    Polio vaccine  Aged Out    Meningococcal (ACWY) vaccine  Aged Out    Pneumococcal 0-64 years Vaccine  Discontinued    HIV screen  Discontinued    Lung Cancer Screening &/or Counseling  Discontinued       Hemoglobin A1C (%)   Date Value   09/26/2024 6.1   05/03/2024 6.2   10/02/2023 6.0             ( goal A1C is < 7)   No components found for: \"LABMICR\"  No components found for: \"LDLCHOLESTEROL\", \"LDLCALC\"    (goal LDL is <100)   AST (U/L)   Date Value   10/02/2023 20     ALT (U/L)   Date Value   10/02/2023 19     BUN (mg/dL)   Date Value   10/02/2023 13     BP Readings from Last 3 Encounters:   01/15/25 (!) 152/97   01/10/25 136/79   09/26/24 128/82          (goal 120/80)    All Future Testing planned in

## 2025-01-30 ENCOUNTER — CARE COORDINATION (OUTPATIENT)
Dept: CARE COORDINATION | Age: 65
End: 2025-01-30

## 2025-01-30 NOTE — CARE COORDINATION
Phoned patient, he states that he's waiting to hear from   [employer] for the next step for getting some improvement  in his apartment and in the apartment complex.  Patient stated  that he hasn't received any update and will be in touch when   he hears from Temi @ [employer].    Plan of Care  HC will await a return call from the patient

## 2025-02-05 ENCOUNTER — CARE COORDINATION (OUTPATIENT)
Dept: CARE COORDINATION | Age: 65
End: 2025-02-05

## 2025-02-05 NOTE — CARE COORDINATION
Ambulatory Care Coordination Note     2025 12:57 PM     Patient Current Location:  Home: [address] [facility] [address] [place] [id number]     ACM contacted the patient by telephone. Verified name and  with patient as identifiers.         ACM: Raúl Ospina RN     Challenges to be reviewed by the provider   Additional needs identified to be addressed with provider No  none               Method of communication with provider:  staff message.    Utilization: Has the patient been seen in the ED since your last call? no    Care Summary Note:     Writer phoned Patient for ACM update and to discuss cc plan of care.      Patient was reminded of his appointment with Dr. Xiao on Monday, 2/10/2025.  He will call to schedule transportation today.    Patient states he hasn't received a nebulizer machine.  He was informed he doesn't have medications ordered for a nebulizer.  Writer recommends he discuss with Dr. Xiao, Pulmonology, at visit on 2/10/2025.  Patient is scheduled to have a Bronchoscopy on 2025.      Patient has a blood glucose machine.  He test his BG while on the phone with Writer.  It was 131.  He has a BP machine as well.  His BP was 124/71 during our conversation.    Patient completed his CT of chest on 2025.  Result reviewed.  Patient to see Dr. Xiao on Monday, 2/10/2025. Patient denies shortness of breath and chest discomfort.  He denies worsening of CPOD.  He denies chest discomfort.  Writer will follow up with Patient next week.    Patient doesn't have many medications on his med list.  He is not taking Atorvastatin, Duloxetine, and and Vitamin D.  Nicotine medications are not covered by Patient's insurance.  Writer will check with Pharmacy.    Patient's main concern is finding a new place to live.  Davonte Mathew, RYAN, AC, is working with Patient in regards to this request.    Patient denies concerns at this time.    Writer phoned Pharmacy to check the status of Atorvastatin and

## 2025-02-13 ENCOUNTER — CARE COORDINATION (OUTPATIENT)
Dept: CARE COORDINATION | Age: 65
End: 2025-02-13

## 2025-02-13 NOTE — CARE COORDINATION
HC phoned and spoke to the patient regarding updates for his  Housing situation.  Patient stated that everything is the same, he  continues to hope that something will come through for him.  HC  inquired if the patient had heard from the lady at [facility].   Patient stated that he hasn't heard anymore from her and probably  won't.     Plan of Care  HC will follow up with patient for other social needs

## 2025-02-18 ENCOUNTER — TELEPHONE (OUTPATIENT)
Dept: PULMONOLOGY | Age: 65
End: 2025-02-18

## 2025-02-18 NOTE — TELEPHONE ENCOUNTER
Pt called office to verify date/time of Bronch - Guillermina gave pt information - asked pt if he received PREP for Bronch - pt informed Guillermina he didn't.  According to result note on PET scan - information was mailed to him 1/30/25 -  Writer called pt to inform him of location, and that he will need a  - NPO after midnight - pt is not on blood thinners - will mail another prep sheet, asked he call office if he doesn't receive within the next few days.    Pt voiced understanding.

## 2025-02-19 ENCOUNTER — CARE COORDINATION (OUTPATIENT)
Dept: CARE COORDINATION | Age: 65
End: 2025-02-19

## 2025-02-19 NOTE — CARE COORDINATION
Ambulatory Care Coordination Note     2025 1:44 PM     Patient Current Location:  Home: [address] [place] [address] [address] [place] [address]     ACM contacted the patient by telephone. Verified name and  with patient as identifiers.         ACM: Raúl Ospina RN     Challenges to be reviewed by the provider   Additional needs identified to be addressed with provider No  none               Method of communication with provider: staff message.    Utilization: Has the patient been seen in the ED since your last call? no    Care Summary Note:     Writer phoned Patient for ACM update.  He informed Writer that he is scheduled to have a procedure done on 2025.  He has the paperwork.      2025 Patient is scheduled for a ION NAVIGATIONAL BRONCHOSCOPY, EBUS, TBNA, PATHOLOGY (C-ARM) - GI SCHEDULED     Gabriele Smith, Pharmacy Student, is currently working with Patient to clarify insurance coverage and help Patient get medications that he doesn't have.    Patient denies any concerns today.  Writer will follow up with Patient next week.    Offered patient enrollment in the Remote Patient Monitoring (RPM) program for in-home monitoring: Yes, but did not enroll at this time: controlled chronic disease management and already monitoring with home equipment.     Assessments Completed:   Diabetes Assessment      Meal Planning: None   How often do you test your blood sugar?: Daily   Do you have barriers with adherence to non-pharmacologic self-management interventions? (Nutrition/Exercise/Self-Monitoring): No   Have you ever had to go to the ED for symptoms of low blood sugar?: No       No patient-reported symptoms   Do you have hyperglycemia symptoms?: No   Do you have hypoglycemia symptoms?: No   Last Blood Sugar Value: 103   Blood Sugar Monitoring Regimen: Once a Day         ,   COPD Assessment    Is the patient able to verbalize Rescue vs. Long Acting medications?: Yes  Does the patient have a nebulizer?: No  Does the

## 2025-02-24 ENCOUNTER — ANESTHESIA EVENT (OUTPATIENT)
Dept: OPERATING ROOM | Age: 65
End: 2025-02-24
Payer: MEDICARE

## 2025-02-24 ASSESSMENT — ENCOUNTER SYMPTOMS: SHORTNESS OF BREATH: 1

## 2025-02-24 ASSESSMENT — LIFESTYLE VARIABLES: SMOKING_STATUS: 1

## 2025-02-24 NOTE — ANESTHESIA PRE PROCEDURE
Department of Anesthesiology  Preprocedure Note       Name:  Vic Nunez   Age:  64 y.o.  :  1960                                          MRN:  [id number]         Date:  2025      Surgeon: Surgeon(s):  Calixto Morgan MD    Procedure: Procedure(s):  ION NAVIGATIONAL BRONCHOSCOPY, EBUS, TBNA, PATHOLOGY (C-ARM) - GI SCHEDULED    Medications prior to admission:   Prior to Admission medications    Medication Sig Start Date End Date Taking? Authorizing Provider   DULoxetine (CYMBALTA) 30 MG extended release capsule TAKE ONE CAPSULE BY MOUTH ONCE A DAY 25  Yes Nakia Alexander MD   budesonide-formoterol (SYMBICORT) 160-4.5 MCG/ACT AERO Inhale 2 puffs into the lungs 2 times daily 1/15/25  Yes Yan Xiao MD   metFORMIN (GLUCOPHAGE-XR) 500 MG extended release tablet Take 1 tablet by mouth daily (with breakfast) 24  Yes Shirin Coe MD   albuterol sulfate HFA (VENTOLIN HFA) 108 (90 Base) MCG/ACT inhaler Inhale 2 puffs into the lungs every 6 hours as needed for Wheezing or Shortness of Breath 1/15/25   Yan Xiao MD   atorvastatin (LIPITOR) 20 MG tablet Take 1 tablet by mouth daily  Patient not taking: Reported on 2025   Nakia Alexander MD   nicotine (NICODERM CQ) 14 MG/24HR Place 1 patch onto the skin every 24 hours  Patient not taking: Reported on 2025  Nakia Alexander MD   nicotine polacrilex (NICORETTE) 2 MG gum Take 1 each by mouth as needed for Smoking cessation  Patient not taking: Reported on 1/10/2025 1/7/25   Nakia Alexander MD   vitamin D (ERGOCALCIFEROL) 1.25 MG (91612 UT) CAPS capsule Take 1 capsule by mouth once a week  Patient not taking: Reported on 1/10/2025 1/7/25   Nakia Alexander MD   ibuprofen (ADVIL;MOTRIN) 800 MG tablet Take 1 tablet by mouth 2 times daily as needed for Pain 24   Nakia Alexander MD       Current medications:    No current facility-administered medications for this encounter.       Allergies:    Allergies   Allergen

## 2025-02-24 NOTE — H&P
History and Physical    Pt Name: Vci Nunez  MRN: [id number]  YOB: 1960  Date of evaluation: 2/25/2025  Primary Care Physician: Nakia Alexander MD    SUBJECTIVE:   History of Chief Complaint:    Vic Nunez is a 64 y.o. male who is scheduled today for ION NAVIGATIONAL BRONCHOSCOPY, EBUS, TBNA, PATHOLOGY. He reports history of lung cancer, s/p right lobectomy in 2010. He reports finding of pulmonary nodule. He reports he has cut down on his cigarette smoking, states has had approximately 4 cigarettes in the past month.   Allergies  is allergic to no known allergies.  Medications  Prior to Admission medications    Medication Sig Start Date End Date Taking? Authorizing Provider   DULoxetine (CYMBALTA) 30 MG extended release capsule TAKE ONE CAPSULE BY MOUTH ONCE A DAY 1/29/25  Yes Nakia Alexander MD   budesonide-formoterol (SYMBICORT) 160-4.5 MCG/ACT AERO Inhale 2 puffs into the lungs 2 times daily 1/15/25  Yes Yan Xiao MD   metFORMIN (GLUCOPHAGE-XR) 500 MG extended release tablet Take 1 tablet by mouth daily (with breakfast) 5/31/24  Yes Shirin Coe MD   albuterol sulfate HFA (VENTOLIN HFA) 108 (90 Base) MCG/ACT inhaler Inhale 2 puffs into the lungs every 6 hours as needed for Wheezing or Shortness of Breath 1/15/25   Yan Xiao MD   atorvastatin (LIPITOR) 20 MG tablet Take 1 tablet by mouth daily  Patient not taking: Reported on 2/19/2025 1/7/25   Nakia Alexander MD   nicotine (NICODERM CQ) 14 MG/24HR Place 1 patch onto the skin every 24 hours  Patient not taking: Reported on 1/22/2025 1/7/25 2/6/25  Nakia Alexander MD   nicotine polacrilex (NICORETTE) 2 MG gum Take 1 each by mouth as needed for Smoking cessation  Patient not taking: Reported on 1/10/2025 1/7/25   Nakia Alexander MD   vitamin D (ERGOCALCIFEROL) 1.25 MG (33207 UT) CAPS capsule Take 1 capsule by mouth once a week  Patient not taking: Reported on 1/10/2025 1/7/25   Nakia Alexander MD   ibuprofen (ADVIL;MOTRIN) 800 MG

## 2025-02-25 ENCOUNTER — ANESTHESIA (OUTPATIENT)
Dept: OPERATING ROOM | Age: 65
End: 2025-02-25
Payer: MEDICARE

## 2025-02-25 ENCOUNTER — APPOINTMENT (OUTPATIENT)
Dept: CT IMAGING | Age: 65
End: 2025-02-25
Attending: INTERNAL MEDICINE
Payer: MEDICARE

## 2025-02-25 ENCOUNTER — HOSPITAL ENCOUNTER (OUTPATIENT)
Age: 65
Setting detail: OUTPATIENT SURGERY
Discharge: HOME | End: 2025-02-25
Attending: INTERNAL MEDICINE | Admitting: INTERNAL MEDICINE
Payer: MEDICARE

## 2025-02-25 ENCOUNTER — APPOINTMENT (OUTPATIENT)
Dept: GENERAL RADIOLOGY | Age: 65
End: 2025-02-25
Attending: INTERNAL MEDICINE
Payer: MEDICARE

## 2025-02-25 VITALS
SYSTOLIC BLOOD PRESSURE: 134 MMHG | DIASTOLIC BLOOD PRESSURE: 84 MMHG | OXYGEN SATURATION: 99 % | TEMPERATURE: 97.7 F | HEIGHT: 72 IN | BODY MASS INDEX: 26.01 KG/M2 | RESPIRATION RATE: 18 BRPM | WEIGHT: 192 LBS | HEART RATE: 68 BPM

## 2025-02-25 DIAGNOSIS — R91.1 RIGHT LOWER LOBE PULMONARY NODULE: ICD-10-CM

## 2025-02-25 LAB
CASE NUMBER:: NORMAL
EKG ATRIAL RATE: 64 BPM
EKG P AXIS: 78 DEGREES
EKG P-R INTERVAL: 134 MS
EKG Q-T INTERVAL: 404 MS
EKG QRS DURATION: 94 MS
EKG QTC CALCULATION (BAZETT): 416 MS
EKG R AXIS: 36 DEGREES
EKG T AXIS: 43 DEGREES
EKG VENTRICULAR RATE: 64 BPM
GLUCOSE BLD-MCNC: 95 MG/DL (ref 75–110)
GLUCOSE BLD-MCNC: 97 MG/DL (ref 75–110)

## 2025-02-25 PROCEDURE — 87070 CULTURE OTHR SPECIMN AEROBIC: CPT

## 2025-02-25 PROCEDURE — 88173 CYTOPATH EVAL FNA REPORT: CPT

## 2025-02-25 PROCEDURE — 88342 IMHCHEM/IMCYTCHM 1ST ANTB: CPT

## 2025-02-25 PROCEDURE — 87206 SMEAR FLUORESCENT/ACID STAI: CPT

## 2025-02-25 PROCEDURE — 3700000000 HC ANESTHESIA ATTENDED CARE: Performed by: INTERNAL MEDICINE

## 2025-02-25 PROCEDURE — 82947 ASSAY GLUCOSE BLOOD QUANT: CPT

## 2025-02-25 PROCEDURE — 3609011800 HC BRONCHOSCOPY/TRANSBRONCHIAL LUNG BIOPSY: Performed by: INTERNAL MEDICINE

## 2025-02-25 PROCEDURE — 7100000010 HC PHASE II RECOVERY - FIRST 15 MIN: Performed by: INTERNAL MEDICINE

## 2025-02-25 PROCEDURE — 3700000001 HC ADD 15 MINUTES (ANESTHESIA): Performed by: INTERNAL MEDICINE

## 2025-02-25 PROCEDURE — 2500000003 HC RX 250 WO HCPCS

## 2025-02-25 PROCEDURE — 87205 SMEAR GRAM STAIN: CPT

## 2025-02-25 PROCEDURE — 82565 ASSAY OF CREATININE: CPT

## 2025-02-25 PROCEDURE — 87116 MYCOBACTERIA CULTURE: CPT

## 2025-02-25 PROCEDURE — 7100000011 HC PHASE II RECOVERY - ADDTL 15 MIN: Performed by: INTERNAL MEDICINE

## 2025-02-25 PROCEDURE — 71250 CT THORAX DX C-: CPT

## 2025-02-25 PROCEDURE — 6360000002 HC RX W HCPCS

## 2025-02-25 PROCEDURE — 2580000003 HC RX 258

## 2025-02-25 PROCEDURE — 88112 CYTOPATH CELL ENHANCE TECH: CPT

## 2025-02-25 PROCEDURE — 93010 ELECTROCARDIOGRAM REPORT: CPT | Performed by: INTERNAL MEDICINE

## 2025-02-25 PROCEDURE — 2720000010 HC SURG SUPPLY STERILE: Performed by: INTERNAL MEDICINE

## 2025-02-25 PROCEDURE — 88177 CYTP FNA EVAL EA ADDL: CPT

## 2025-02-25 PROCEDURE — 93005 ELECTROCARDIOGRAM TRACING: CPT | Performed by: ANESTHESIOLOGY

## 2025-02-25 PROCEDURE — 88333 PATH CONSLTJ SURG CYTO XM 1: CPT

## 2025-02-25 PROCEDURE — 88305 TISSUE EXAM BY PATHOLOGIST: CPT

## 2025-02-25 PROCEDURE — 88172 CYTP DX EVAL FNA 1ST EA SITE: CPT

## 2025-02-25 PROCEDURE — 3609011900 HC BRONCHOSCOPY NEEDLE BX TRACHEA MAIN STEM&/BRON: Performed by: INTERNAL MEDICINE

## 2025-02-25 PROCEDURE — 3609011100 HC BRONCHOSCOPY BRUSHINGS: Performed by: INTERNAL MEDICINE

## 2025-02-25 PROCEDURE — 2709999900 HC NON-CHARGEABLE SUPPLY: Performed by: INTERNAL MEDICINE

## 2025-02-25 PROCEDURE — 7100000001 HC PACU RECOVERY - ADDTL 15 MIN: Performed by: INTERNAL MEDICINE

## 2025-02-25 PROCEDURE — 88341 IMHCHEM/IMCYTCHM EA ADD ANTB: CPT

## 2025-02-25 PROCEDURE — 3609010800 HC BRONCHOSCOPY ALVEOLAR LAVAGE: Performed by: INTERNAL MEDICINE

## 2025-02-25 PROCEDURE — 71045 X-RAY EXAM CHEST 1 VIEW: CPT

## 2025-02-25 PROCEDURE — 87102 FUNGUS ISOLATION CULTURE: CPT

## 2025-02-25 PROCEDURE — 7100000000 HC PACU RECOVERY - FIRST 15 MIN: Performed by: INTERNAL MEDICINE

## 2025-02-25 RX ORDER — MIDAZOLAM HYDROCHLORIDE 2 MG/2ML
1 INJECTION, SOLUTION INTRAMUSCULAR; INTRAVENOUS EVERY 10 MIN PRN
Status: DISCONTINUED | OUTPATIENT
Start: 2025-02-25 | End: 2025-02-25 | Stop reason: HOSPADM

## 2025-02-25 RX ORDER — FENTANYL CITRATE 50 UG/ML
INJECTION, SOLUTION INTRAMUSCULAR; INTRAVENOUS
Status: DISCONTINUED | OUTPATIENT
Start: 2025-02-25 | End: 2025-02-25 | Stop reason: SDUPTHER

## 2025-02-25 RX ORDER — MIDAZOLAM HYDROCHLORIDE 1 MG/ML
INJECTION, SOLUTION INTRAMUSCULAR; INTRAVENOUS
Status: DISCONTINUED | OUTPATIENT
Start: 2025-02-25 | End: 2025-02-25 | Stop reason: SDUPTHER

## 2025-02-25 RX ORDER — FENTANYL CITRATE 50 UG/ML
25 INJECTION, SOLUTION INTRAMUSCULAR; INTRAVENOUS EVERY 5 MIN PRN
Status: DISCONTINUED | OUTPATIENT
Start: 2025-02-25 | End: 2025-02-25 | Stop reason: HOSPADM

## 2025-02-25 RX ORDER — GLYCOPYRROLATE 0.2 MG/ML
INJECTION INTRAMUSCULAR; INTRAVENOUS
Status: DISCONTINUED | OUTPATIENT
Start: 2025-02-25 | End: 2025-02-25 | Stop reason: SDUPTHER

## 2025-02-25 RX ORDER — ONDANSETRON 2 MG/ML
4 INJECTION INTRAMUSCULAR; INTRAVENOUS
Status: DISCONTINUED | OUTPATIENT
Start: 2025-02-25 | End: 2025-02-25 | Stop reason: HOSPADM

## 2025-02-25 RX ORDER — ALBUTEROL SULFATE 90 UG/1
2 INHALANT RESPIRATORY (INHALATION) EVERY 6 HOURS PRN
Status: DISCONTINUED | OUTPATIENT
Start: 2025-02-25 | End: 2025-02-25 | Stop reason: HOSPADM

## 2025-02-25 RX ORDER — SODIUM CHLORIDE, SODIUM LACTATE, POTASSIUM CHLORIDE, CALCIUM CHLORIDE 600; 310; 30; 20 MG/100ML; MG/100ML; MG/100ML; MG/100ML
INJECTION, SOLUTION INTRAVENOUS
Status: DISCONTINUED | OUTPATIENT
Start: 2025-02-25 | End: 2025-02-25 | Stop reason: SDUPTHER

## 2025-02-25 RX ORDER — ONDANSETRON 2 MG/ML
INJECTION INTRAMUSCULAR; INTRAVENOUS
Status: DISCONTINUED | OUTPATIENT
Start: 2025-02-25 | End: 2025-02-25 | Stop reason: SDUPTHER

## 2025-02-25 RX ORDER — FENTANYL CITRATE 50 UG/ML
50 INJECTION, SOLUTION INTRAMUSCULAR; INTRAVENOUS EVERY 5 MIN PRN
Status: DISCONTINUED | OUTPATIENT
Start: 2025-02-25 | End: 2025-02-25 | Stop reason: HOSPADM

## 2025-02-25 RX ORDER — SODIUM CHLORIDE, SODIUM LACTATE, POTASSIUM CHLORIDE, CALCIUM CHLORIDE 600; 310; 30; 20 MG/100ML; MG/100ML; MG/100ML; MG/100ML
INJECTION, SOLUTION INTRAVENOUS CONTINUOUS
Status: DISCONTINUED | OUTPATIENT
Start: 2025-02-25 | End: 2025-02-25 | Stop reason: HOSPADM

## 2025-02-25 RX ORDER — SODIUM CHLORIDE 0.9 % (FLUSH) 0.9 %
5-40 SYRINGE (ML) INJECTION PRN
Status: DISCONTINUED | OUTPATIENT
Start: 2025-02-25 | End: 2025-02-25 | Stop reason: HOSPADM

## 2025-02-25 RX ORDER — SCOPOLAMINE 1 MG/3D
1 PATCH, EXTENDED RELEASE TRANSDERMAL ONCE
Status: DISCONTINUED | OUTPATIENT
Start: 2025-02-25 | End: 2025-02-25 | Stop reason: HOSPADM

## 2025-02-25 RX ORDER — NALOXONE HYDROCHLORIDE 0.4 MG/ML
INJECTION, SOLUTION INTRAMUSCULAR; INTRAVENOUS; SUBCUTANEOUS PRN
Status: DISCONTINUED | OUTPATIENT
Start: 2025-02-25 | End: 2025-02-25 | Stop reason: HOSPADM

## 2025-02-25 RX ORDER — DIPHENHYDRAMINE HYDROCHLORIDE 50 MG/ML
12.5 INJECTION INTRAMUSCULAR; INTRAVENOUS
Status: DISCONTINUED | OUTPATIENT
Start: 2025-02-25 | End: 2025-02-25 | Stop reason: HOSPADM

## 2025-02-25 RX ORDER — SODIUM CHLORIDE 0.9 % (FLUSH) 0.9 %
5-40 SYRINGE (ML) INJECTION EVERY 12 HOURS SCHEDULED
Status: DISCONTINUED | OUTPATIENT
Start: 2025-02-25 | End: 2025-02-25 | Stop reason: HOSPADM

## 2025-02-25 RX ORDER — ALBUTEROL SULFATE 0.83 MG/ML
2.5 SOLUTION RESPIRATORY (INHALATION) EVERY 8 HOURS PRN
Status: DISCONTINUED | OUTPATIENT
Start: 2025-02-25 | End: 2025-02-25 | Stop reason: HOSPADM

## 2025-02-25 RX ORDER — PROPOFOL 10 MG/ML
INJECTION, EMULSION INTRAVENOUS
Status: DISCONTINUED | OUTPATIENT
Start: 2025-02-25 | End: 2025-02-25 | Stop reason: SDUPTHER

## 2025-02-25 RX ORDER — DIPHENHYDRAMINE HYDROCHLORIDE 50 MG/ML
INJECTION INTRAMUSCULAR; INTRAVENOUS
Status: DISCONTINUED | OUTPATIENT
Start: 2025-02-25 | End: 2025-02-25 | Stop reason: SDUPTHER

## 2025-02-25 RX ORDER — LIDOCAINE HYDROCHLORIDE 10 MG/ML
INJECTION, SOLUTION EPIDURAL; INFILTRATION; INTRACAUDAL; PERINEURAL
Status: DISCONTINUED | OUTPATIENT
Start: 2025-02-25 | End: 2025-02-25 | Stop reason: SDUPTHER

## 2025-02-25 RX ORDER — SODIUM CHLORIDE 9 MG/ML
INJECTION, SOLUTION INTRAVENOUS PRN
Status: DISCONTINUED | OUTPATIENT
Start: 2025-02-25 | End: 2025-02-25 | Stop reason: HOSPADM

## 2025-02-25 RX ORDER — ROCURONIUM BROMIDE 10 MG/ML
INJECTION, SOLUTION INTRAVENOUS
Status: DISCONTINUED | OUTPATIENT
Start: 2025-02-25 | End: 2025-02-25 | Stop reason: SDUPTHER

## 2025-02-25 RX ORDER — FENTANYL CITRATE 50 UG/ML
25 INJECTION, SOLUTION INTRAMUSCULAR; INTRAVENOUS
Status: DISCONTINUED | OUTPATIENT
Start: 2025-02-25 | End: 2025-02-25 | Stop reason: HOSPADM

## 2025-02-25 RX ORDER — PHENYLEPHRINE HCL IN 0.9% NACL 1 MG/10 ML
SYRINGE (ML) INTRAVENOUS
Status: DISCONTINUED | OUTPATIENT
Start: 2025-02-25 | End: 2025-02-25 | Stop reason: SDUPTHER

## 2025-02-25 RX ORDER — FENTANYL CITRATE 50 UG/ML
50 INJECTION, SOLUTION INTRAMUSCULAR; INTRAVENOUS
Status: DISCONTINUED | OUTPATIENT
Start: 2025-02-25 | End: 2025-02-25 | Stop reason: HOSPADM

## 2025-02-25 RX ADMIN — ROCURONIUM BROMIDE 25 MG: 10 INJECTION, SOLUTION INTRAVENOUS at 13:06

## 2025-02-25 RX ADMIN — SUGAMMADEX 200 MG: 100 INJECTION, SOLUTION INTRAVENOUS at 14:27

## 2025-02-25 RX ADMIN — ROCURONIUM BROMIDE 10 MG: 10 INJECTION, SOLUTION INTRAVENOUS at 13:32

## 2025-02-25 RX ADMIN — LIDOCAINE HYDROCHLORIDE 50 MG: 10 INJECTION, SOLUTION EPIDURAL; INFILTRATION; INTRACAUDAL; PERINEURAL at 12:32

## 2025-02-25 RX ADMIN — FENTANYL CITRATE 100 MCG: 50 INJECTION, SOLUTION INTRAMUSCULAR; INTRAVENOUS at 12:32

## 2025-02-25 RX ADMIN — Medication 100 MCG: at 13:07

## 2025-02-25 RX ADMIN — Medication 100 MCG: at 13:33

## 2025-02-25 RX ADMIN — DIPHENHYDRAMINE HYDROCHLORIDE 12.5 MG: 50 INJECTION INTRAMUSCULAR; INTRAVENOUS at 13:12

## 2025-02-25 RX ADMIN — Medication 100 MCG: at 14:03

## 2025-02-25 RX ADMIN — Medication 100 MCG: at 13:43

## 2025-02-25 RX ADMIN — SODIUM CHLORIDE, POTASSIUM CHLORIDE, SODIUM LACTATE AND CALCIUM CHLORIDE: 600; 310; 30; 20 INJECTION, SOLUTION INTRAVENOUS at 12:13

## 2025-02-25 RX ADMIN — Medication 100 MCG: at 14:20

## 2025-02-25 RX ADMIN — ROCURONIUM BROMIDE 50 MG: 10 INJECTION, SOLUTION INTRAVENOUS at 12:32

## 2025-02-25 RX ADMIN — MIDAZOLAM 2 MG: 1 INJECTION INTRAMUSCULAR; INTRAVENOUS at 12:30

## 2025-02-25 RX ADMIN — Medication 100 MCG: at 13:53

## 2025-02-25 RX ADMIN — Medication 100 MCG: at 14:11

## 2025-02-25 RX ADMIN — GLYCOPYRROLATE 0.2 MG: 0.2 INJECTION INTRAMUSCULAR; INTRAVENOUS at 14:45

## 2025-02-25 RX ADMIN — PROPOFOL 200 MCG/KG/MIN: 10 INJECTION, EMULSION INTRAVENOUS at 12:40

## 2025-02-25 RX ADMIN — Medication 100 MCG: at 13:22

## 2025-02-25 RX ADMIN — PROPOFOL 200 MG: 10 INJECTION, EMULSION INTRAVENOUS at 12:32

## 2025-02-25 RX ADMIN — Medication 100 MCG: at 13:18

## 2025-02-25 RX ADMIN — ONDANSETRON 4 MG: 2 INJECTION INTRAMUSCULAR; INTRAVENOUS at 14:23

## 2025-02-25 RX ADMIN — Medication 100 MCG: at 14:39

## 2025-02-25 ASSESSMENT — PAIN DESCRIPTION - LOCATION
LOCATION: THROAT

## 2025-02-25 ASSESSMENT — PAIN SCALES - GENERAL
PAINLEVEL_OUTOF10: 2

## 2025-02-25 ASSESSMENT — PAIN DESCRIPTION - DESCRIPTORS
DESCRIPTORS: OTHER (COMMENT)
DESCRIPTORS: OTHER (COMMENT)
DESCRIPTORS: ACHING

## 2025-02-25 ASSESSMENT — PAIN DESCRIPTION - ORIENTATION: ORIENTATION: MID

## 2025-02-25 ASSESSMENT — PAIN - FUNCTIONAL ASSESSMENT
PAIN_FUNCTIONAL_ASSESSMENT: 0-10
PAIN_FUNCTIONAL_ASSESSMENT: PREVENTS OR INTERFERES SOME ACTIVE ACTIVITIES AND ADLS

## 2025-02-25 NOTE — PROGRESS NOTES
Dr. Morgan @ bedside, Xray images seen. Patient is ok for discharge once PACU care will be completed.

## 2025-02-25 NOTE — PROGRESS NOTES
Discharge instructions discuss with wife, verbalizes understanding. All belongings given to patient. Discharge per wheelchair in a stable condition.

## 2025-02-25 NOTE — DISCHARGE INSTRUCTIONS
Patient may have small amount of hemoptysis (blood in sputum) and low grade fever for 1-2 days  Call clinic if hemoptysis worsens or fails to resolve.  Patient will be receive call regarding pathology results from Calixto Morgan MD   Follow up appointment at the pulmonary clinic will be arranged.          Bronchoscopy: What to Expect at Home  Your Recovery     Bronchoscopy lets your doctor look at your airway through a tube called a bronchoscope. Afterward, you may feel tired for 1 or 2 days. Your mouth may feel very dry for several hours after the procedure. You may also have a sore throat and a hoarse voice for a few days. Sucking on throat lozenges or gargling with warm salt water may help soothe your sore throat.  If a sample of tissue (biopsy) was taken, you may spit up a small amount of blood or have bloody saliva. This is normal.  Ask your doctor when you can drive again. Do not smoke for at least 24 hours.  This care sheet gives you a general idea about how long it will take for you to recover. But each person recovers at a different pace. Follow the steps below to get better as quickly as possible.  How can you care for yourself at home?  Activity    Do not eat anything for 2 hours after the procedure.     Rest when you feel tired. Getting enough sleep will help you recover.     Avoid strenuous activities, such as bicycle riding, jogging, weight lifting, or aerobic exercise, until your doctor says it is okay.     Ask your doctor when you can drive again.   Diet    You can eat your normal diet. If your stomach is upset, try bland, low-fat foods like plain rice, broiled chicken, toast, and yogurt.     If it is painful to swallow, start out with cold drinks, flavored ice pops, and ice cream. Next, try soft foods like pudding, yogurt, canned or cooked fruit, scrambled eggs, and mashed potatoes. Avoid eating hard or scratchy foods like chips or raw vegetables. Avoid orange or tomato juice and other acidic foods

## 2025-02-26 LAB
BUN BLD-MCNC: NORMAL MG/DL (ref 8–26)
CASE NUMBER:: NORMAL
EGFR, POC: >90 ML/MIN/1.73M2
GLUCOSE BLD-MCNC: 95 MG/DL (ref 74–100)
MICROORGANISM SPEC CULT: NORMAL
MICROORGANISM/AGENT SPEC: NORMAL
POC CREATININE: 0.9 MG/DL (ref 0.51–1.19)
SERVICE CMNT-IMP: NORMAL
SPECIMEN DESCRIPTION: NORMAL

## 2025-02-26 NOTE — OP NOTE
FLEXIBLE BRONCHOSCOPY PROCEDURE NOTE     Patient: Vic Nunez  YOB: 1960  MRN: [id number]  Date of Procedure: 2/25/2025    Pre-Op Diagnosis:Pre-Op Diagnosis Codes:      * Right lower lobe pulmonary nodule [R91.1]   Post-Op Diagnosis:Post-Op Diagnosis Codes:     * Right lower lobe pulmonary nodule [R91.1]     Procedure(s):  BRONCHOSCOPY/TRANSBRONCHIAL NEEDLE BIOPSY ROBOTIC  BRONCHOSCOPY COMPUTER ASSISTED ROBOTIC  BRONCHOSCOPY/TRANSBRONCHIAL LUNG BIOPSY ROBOTIC  BRONCHOSCOPY BRUSHINGS ROBOTIC  BRONCHOSCOPY ALVEOLAR LAVAGE ROBOTIC     Surgeons:   Calixto Morgan MD  First Assistant: Christine Mckay RN    Description of procedure:      [x] Fiberoptic Bronchoscopy   [x] Inspection  [] Bronchial washing   [] Bronchoalveolar lavage   [] Bronchial brushing   [] Transbronchial biopsy   [] Endobronchial biopsy  [] Fluoroscopic guidance         [x] ION Robotic Navigation Bronchoscopy  [x] Computer assisted planning and navigation  [x] Bronchoalveolar lavage (x 2)  [x] Bronchial brushing (x 2)  [x] Transbronchial needle aspiration (x 2)  [x] Transbronchial biopsy (x 2)  [] Endobronchial biopsy  [x] Fluoroscopic guidance     [x] Radial EBUS guidance      Indication for Bronchoscopy:   [x] Nodule(s)    [] Atelectasis  [] Hemoptysis  [] Pneumonia/Pulmonary infiltrate  [] Respiratory failure/Hypoxemia  [] Airway Stenosis/Obstruction  [] Endotracheal mass/obstruction  [] Endobronchial mass/obstruction  [] Mediastinal/Hilar Adenopathy  [] Immunocompromised host   [] Lung cancer  [] Metastatic disease.  [] Lung transplant diagnostic/surveillance  [] Bronchiectasis.    [] Therapeutic, to clear airway secretions    Consent:  [x] Details of the procedure were explained in detail which included risks and benefits.    [x] An opportunity was provided to ask questions.    [x] Consent was obtained from the following person(s):  [x] Patient   [] Sibling  [] Spouse               [] Parent  [] Child  [] Guardian    Documentation

## 2025-02-26 NOTE — ANESTHESIA POSTPROCEDURE EVALUATION
Department of Anesthesiology  Postprocedure Note    Patient: Vic Nunez  MRN: [id number]  YOB: 1960  Date of evaluation: 2/25/2025    Procedure Summary       Date: 02/25/25 Room / Location: [address]    Anesthesia Start: 1227 Anesthesia Stop: 1500    Procedures:       BRONCHOSCOPY/TRANSBRONCHIAL NEEDLE BIOPSY ROBOTIC      BRONCHOSCOPY COMPUTER ASSISTED ROBOTIC      BRONCHOSCOPY/TRANSBRONCHIAL LUNG BIOPSY ROBOTIC      BRONCHOSCOPY BRUSHINGS ROBOTIC      BRONCHOSCOPY ALVEOLAR LAVAGE ROBOTIC Diagnosis:       Right lower lobe pulmonary nodule      (Right lower lobe pulmonary nodule [R91.1])    Surgeons: Calixto Morgan MD Responsible Provider: Jose Daniel Kumar MD    Anesthesia Type: MAC ASA Status: 4            Anesthesia Type: No value filed.    Asha Phase I: Asha Score: 10    Asha Phase II: Asha Score: 10    Anesthesia Post Evaluation    Patient location during evaluation: bedside  Patient participation: complete - patient participated  Level of consciousness: awake and alert  Airway patency: patent  Nausea & Vomiting: no nausea and no vomiting  Cardiovascular status: hemodynamically stable  Respiratory status: acceptable  Hydration status: stable  Comments: /84   Pulse 68   Temp 97.7 °F (36.5 °C)   Resp 18   Ht 1.829 m (6')   Wt 87.1 kg (192 lb)   SpO2 99%   BMI 26.04 kg/m²     Pain management: adequate    No notable events documented.

## 2025-02-27 LAB
MICROORGANISM SPEC CULT: NO GROWTH
MICROORGANISM SPEC CULT: NO GROWTH
MICROORGANISM/AGENT SPEC: NORMAL
SERVICE CMNT-IMP: NORMAL
SERVICE CMNT-IMP: NORMAL
SPECIMEN DESCRIPTION: NORMAL
SPECIMEN DESCRIPTION: NORMAL
SURGICAL PATHOLOGY REPORT: NORMAL
SURGICAL PATHOLOGY REPORT: NORMAL

## 2025-02-28 ENCOUNTER — TELEPHONE (OUTPATIENT)
Dept: ONCOLOGY | Age: 65
End: 2025-02-28

## 2025-02-28 DIAGNOSIS — C34.31 SQUAMOUS CELL CARCINOMA OF BRONCHUS IN RIGHT LOWER LOBE (HCC): Primary | ICD-10-CM

## 2025-02-28 RX ORDER — NICOTINE 21 MG/24HR
1 PATCH, TRANSDERMAL 24 HOURS TRANSDERMAL EVERY 24 HOURS
COMMUNITY

## 2025-02-28 NOTE — PROGRESS NOTES
I called patient regarding results of bronchoscopic biopsy with Ion navigation.  The right lower lobe nodule biopsy is consistent with small cell lung cancer.  He has history of right upper lobectomy and was previously following with Dr. Medina.  Order placed for medical and radiation oncology consultation.

## 2025-02-28 NOTE — TELEPHONE ENCOUNTER
[facility] CLINICAL PHARMACY REVIEW     Patient called back - gave permission to writer to contact  in regards to Medicaid insurance information. No answer/invalid number on attempt to original number from previous note - was able to reach  via phone number found online ([phone]). Left message to return writer's call.      quickly called back and writer able to speak to him - Mr. Hernandez shared he would call patient and give patient Medicaid ID. Writer will follow-up shortly with patient.     Gabriele Smith, PharmD Candidate 2025  [facility] Clinical Pharmacy  Department, toll free: [phone]    
[facility] CLINICAL PHARMACY REVIEW     Writer called patient to discuss about medications, and seeing if patient was interested in [facility] for meds or if pill packing or a pill box would be helpful in this patient. When asking the patient about his nicotine patches he said he picked them up and had no questions on the use of the patches but was educated on proper use, pt said he wasn't aware of the night terrors if the patch is worn overnight so the patient said he would wear them starting in the morning and take off before bedtime.      Patient is not interested in switching pharmacies to someplace that could pill pack at this time ([facility] shared couldn't take any more patients for their program for at least a year) and wants to continue with [facility]. Discussed [facility] with patient and he was open to idea - will follow-up with [facility] to see if this is possible.    Inhaler Technique was assessed - patient was updated on the proper use of priming for Symbicort vs albuterol. Assessed inhalation process of inhalers - patient understands on how to use inhalers.    Gabriele Smith, PharmD Candidate 2025  [facility] Clinical Pharmacy  Department, toll free: [phone]    
[facility] CLINICAL PHARMACY REVIEW    Reached patient to check in on smoking cessation. Patient shared no cigarettes and still has some patches left. Discussed that insurance did not cover the nicotine replacement therapy as they are OTC products. Patient shared used to be covered and cannot pay out of pocket. Writer will check in with pharmacy as it does appear Medicaid covers NRT per quick online search. Will follow-up with patient next week.     Gunjan Rivers, PharmD, BCACP, BCGP  [facility] Pharmacist   [facility] Clinical Pharmacy  Department, toll free: [phone], option 1    
[facility] CLINICAL PHARMACY REVIEW     Pharmacy called writer back to share they had figured out billing for NRT and it went through Medicaid with a $0 copay, will be ready today.     Writer called patient and shared the above with patient - patient reports he will pick patches up tomorrow.     Gabriele Smith, PharmD Candidate 2025  [facility] Clinical Pharmacy  Department, toll free: [phone]      
[facility] CLINICAL PHARMACY REVIEW     Patient called writer to share [place] Medicaid ID [id number]. Will follow-up with pharmacy this week about this ID to see if NRT patches can be billed for $0 copay.     Patient also inquired about nebulizer machine - explained Rx would be needed and would need to be filled at durable medical equipment store/pharmacy. Writer provided patient with phone number for [facility] so patient could outreach and set up account with them to hopefully fill nebulizer machine there after next pulmonology appointment.     Gabriele Smith, PharmD Candidate 2025  [facility] Clinical Pharmacy  Department, toll free: [phone]  
[facility] CLINICAL PHARMACY REVIEW     Writer called [place] Medicaid ([phone] option 9) to find correct insurance to bill NRT for patient - was told patient had dual coverage with Medicare and that patient's Medicaid member ID is [id number]. Medicaid was unable to verify coverage for Rx as it looked like patient had other coverage (writer is aware patient does have [facility] Medicare).     Writer relayed Medicaid information to [facility] pharmacy to see if able to find insurance in system and bill and there were still issues. Writer will try to outreach to patient and/or other involved parties to see if any other options.     Gabriele Smith, PharmD Candidate 2025  [facility] Clinical Pharmacy  Department, toll free: [phone]  
[facility] CLINICAL PHARMACY REVIEW    Per chart review and outpatient insurance profile, patient had only Medicare card in their system and Medicare does not cover NRT. A Medicaid card from the Media tab was input into outpatient pharmacy system and attempt made to process NRT through coverage and it said card was inactive so new Medicaid card would be needed.     Reached patient to share findings on NRT coverage. He shared he has a [employer] card and was waiting on his Medicare red, white, and blue card. Explained pharmacy needs updated Medicaid info to bill NRT. Patient shared that he doesn't know everything about the insurance but he provided contact information for the person that he said signed him up for insurance as he had a business card for the gentleman.     Leola \"William\" David, Licensed Health   Phone: [phone]    Writer will follow-up with care coordinator to see if she is familiar with the  or she has suggestions on next steps.     Gunjan Rivers, PharmD, BCACP, BCGP  Population Health Pharmacist   [facility] Clinical Pharmacy  Department, toll free: [phone], option 1    
[facility] CLINICAL PHARMACY REVIEW     Appreciate car coordination team. There has been some confusion on patient's insurance recently as he has not received Medicaid card. Patient reached out to his  and  provided patient with this [facility] Member ID: [id number] ([facility] Medicare/Critical access hospitalcare Dual) and that he should receive new card in 7-10 days.     Writer checked outpatient pharmacy system and the above is the insurance card that the nicotine patches were billed through already and not covered. Writer called Help Desk Number from insurance card ([phone]) for assistance with NRT coverage - spoke with Fred ([facility] pharmacy ) about coverage. Patient has Medicare Part D through [employer] but does not have a [employer] Medicaid plan - staff advised could try to reach out to [facility]'s Medicaid directly to try to get more information or see if patient could log into a state website.     Outreach to patient - shared the above information. He still does not have new insurance cards yet. Inquired if he had cigarettes recently and he states he's \"still straight\", doing well. Will share the above information with care coordination team.      Gunjan Rivers, PharmD, BCACP, BCGP  Population Health Pharmacist   [facility] Clinical Pharmacy  Department, toll free: [phone], option 1    
[facility] CLINICAL PHARMACY REVIEW     Attempt made to call patient - girlfriend answered phone and shared patient was not home. Will try to reach patient again at later date/time.     Gabriele Smith, PharmD Candidate 2025  [facility] Clinical Pharmacy  Department, toll free: [phone]   
[facility] CLINICAL PHARMACY REVIEW     Called [facility] to verify if coverage was accepted for this patient regarding nicotine patches - Medicaid plan was not found so unable to fill NRT. Will continue to follow and reach out to patient and/or other resources to try to help patient.    Gabriele Smith, PharmD Candidate 2025  [facility] Clinical Pharmacy  Department, toll free: [phone]  
Influenza, AFLURIA (age 3 y+), FLUZONE, (age 6 mo+), Quadv MDV, 0.5mL 12/01/2016, 12/02/2019, 10/23/2020    Influenza, FLUARIX, FLULAVAL, FLUZONE (age 6 mo+) and AFLURIA, (age 3 y+), Quadv PF, 0.5mL 09/14/2017, 10/02/2023    Influenza, FLUARIX, FLULAVAL, FLUZONE, (age 6 mo+), AFLURIA, (age 3 y+), IM, Trivalent PF, 0.5mL 09/26/2024    Pneumococcal, PCV20, PREVNAR 20, (age 6w+), IM, 0.5mL 05/31/2024    Pneumococcal, PPSV23, PNEUMOVAX 23, (age 2y+), SC/IM, 0.5mL 05/19/2017    TDaP, ADACEL (age 10y-64y), BOOSTRIX (age 10y+), IM, 0.5mL 12/01/2016      Social History:  Tobacco Use    Smoking status: Some Days     Current packs/day: 0.50     Average packs/day: 0.5 packs/day for 54.0 years (27.0 ttl pk-yrs)     Types: Cigarettes     Passive exposure: Current    Smokeless tobacco: Never    Tobacco comments:     still smoking some   Substance Use Topics    Alcohol use: No     Alcohol/week: 0.0 standard drinks of alcohol     Tobacco/Cessation Information  Current smoking status: Patient has quit/attempting to quit - last cigarette was Monday  Attempted to quit before: Patient reports had been on and off using patches the past few months but this is first real time he's quit/really attempted to do so, usually had just decreased number of cigarettes per day rather than stopped completely  Motivating factors: He reports quitting was his New Year resolution as smoking has affected his health, he shared his girlfriend is helping in quit process, he doesn't want to get girlfriend or baby sick; he reports other family members have already quit smoking    ASSESSMENT/PLAN:   - Smoking Cessation: Patient would like to continue patches and is interested in getting gum for potential cravings. He is interested in getting refills at pharmacy - prescriptions are on file at pharmacy. Unfortunately, Per [employer] via [employer] portal, patient's insurance does not cover nicotine patches/gum as OTC drugs are not covered.   Nicotine

## 2025-02-28 NOTE — TELEPHONE ENCOUNTER
Writer attempted to schedule pt from his referral, however pt was dismissed previously from care with Dr Oliveira due to frequent no shows. Writer will wait to schedule until confirming with Dr Oliveira.

## 2025-03-03 LAB
MICROORGANISM SPEC CULT: NORMAL
MICROORGANISM SPEC CULT: NORMAL
MICROORGANISM/AGENT SPEC: NORMAL
MICROORGANISM/AGENT SPEC: NORMAL
SERVICE CMNT-IMP: NORMAL
SERVICE CMNT-IMP: NORMAL
SPECIMEN DESCRIPTION: NORMAL
SPECIMEN DESCRIPTION: NORMAL

## 2025-03-04 ENCOUNTER — OFFICE VISIT (OUTPATIENT)
Dept: PULMONOLOGY | Age: 65
End: 2025-03-04

## 2025-03-04 VITALS
DIASTOLIC BLOOD PRESSURE: 83 MMHG | WEIGHT: 190 LBS | HEIGHT: 72 IN | BODY MASS INDEX: 25.73 KG/M2 | HEART RATE: 75 BPM | OXYGEN SATURATION: 95 % | RESPIRATION RATE: 18 BRPM | SYSTOLIC BLOOD PRESSURE: 138 MMHG | TEMPERATURE: 97.3 F

## 2025-03-04 DIAGNOSIS — Z85.118 HISTORY OF LUNG CANCER: ICD-10-CM

## 2025-03-04 DIAGNOSIS — Z87.891 HISTORY OF SMOKING 30 OR MORE PACK YEARS: ICD-10-CM

## 2025-03-04 DIAGNOSIS — R91.8 PULMONARY INFILTRATE: ICD-10-CM

## 2025-03-04 DIAGNOSIS — J44.9 CHRONIC OBSTRUCTIVE PULMONARY DISEASE, UNSPECIFIED COPD TYPE (HCC): ICD-10-CM

## 2025-03-04 DIAGNOSIS — R91.1 PULMONARY NODULE: Primary | ICD-10-CM

## 2025-03-04 NOTE — PROGRESS NOTES
left upper lobe area was also a scar like area above noted scarlike area. There was no significant uptake, but there was mild uptake.    Testing  Pulmonary function test  Pulmonary function test in the office today showed FEV1 of 46% but significant response to bronchodilator with postbronchodilator FEV1 was 59% also there is significant prominent FVC lung volumes were normal and there is mild reduction diffusion capacity 66%.    At  Transbronchial biopsy of right lower lobe lung was diagnosed with a squamous cell carcinoma.  I have reviewed the results of the biopsy       Assessment and Plan       ICD-10-CM    1. Pulmonary nodule  R91.1       2. History of lung cancer  Z85.118       3. Chronic obstructive pulmonary disease, unspecified COPD type (HCC)  J44.9       4. Pulmonary infiltrate  R91.8       5. History of smoking 30 or more pack years  Z87.891               Assessment & Plan    1. 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  A transbronchial biopsy with navigational bronchoscopy and radial EBUS confirmed squamous cell carcinoma in the right lower lobe. He has been referred to oncology and radiation oncology with upcoming appointments on 07/10/2025 and 07/14/2025. The results of the biopsy, PET scan, and CT scan were discussed in detail with patient. Given his history of right upper lobectomy for lung cancer and COPD, and as there is a left upper lobe nodule present which could be malignant although PET scan radiology did not mention uptake but there is minimal uptake present and as the nodule is not solid and infiltrative PET scan may not be completely accurate or could be false negative for those reason surgery may not be a viable option but if desire and opinion from cardiothoracic surgery can be obtained depend upon the evaluation by oncology and radiation oncology. Chemotherapy and radiation therapy will be considered by oncology.    2.  Abnormal CT scan with right lower lobe infiltrate/semisolid

## 2025-03-05 ENCOUNTER — TELEPHONE (OUTPATIENT)
Dept: ONCOLOGY | Age: 65
End: 2025-03-05

## 2025-03-05 NOTE — TELEPHONE ENCOUNTER
Name: Vic Nunez .  : 1960  MRN: [id number]    Oncology Navigation Follow-Up Note    Contact Type:  Telephone    Notes: Navigator left message with female answering pts. Contact number. Writer introducing self and requesting pt. Return call. Listener also given date of MO consult appt.       Electronically signed by Jessica Moeller RN on 3/5/2025 at 3:00 PM

## 2025-03-06 ENCOUNTER — CARE COORDINATION (OUTPATIENT)
Dept: CARE COORDINATION | Age: 65
End: 2025-03-06

## 2025-03-07 NOTE — CARE COORDINATION
HC phoned patient to follow on his transportation to his medical   appointments .  Patient stated that he now has medical transportation  through his insurance, and will utilize the services of [facility] as well.  Patient will inform HC when he has his appointment and   transportation in place.    Plan of Care  HC will follow up with patient

## 2025-03-10 ENCOUNTER — TELEPHONE (OUTPATIENT)
Dept: RADIATION ONCOLOGY | Age: 65
End: 2025-03-10

## 2025-03-11 ENCOUNTER — CARE COORDINATION (OUTPATIENT)
Dept: CARE COORDINATION | Age: 65
End: 2025-03-11

## 2025-03-11 ENCOUNTER — TELEPHONE (OUTPATIENT)
Dept: ONCOLOGY | Age: 65
End: 2025-03-11

## 2025-03-11 ASSESSMENT — ENCOUNTER SYMPTOMS: DYSPNEA ASSOCIATED WITH: MINIMAL EXERTION

## 2025-03-11 NOTE — CARE COORDINATION
Ambulatory Care Coordination Note     3/11/2025 1:21 PM     Patient Current Location:  Home: [address] [place] [address] [address] [place] [address]     ACM contacted the patient by telephone. Verified name and  with patient as identifiers.       ACM: Raúl Ospina RN     Challenges to be reviewed by the provider   Additional needs identified to be addressed with provider Yes and Patient request pain med for chronic back pain;  He request muscle relaxer if possible and Ibuprofen 800 mg.  none               Method of communication with provider: staff message.    Utilization: Has the patient been seen in the ED since your last call? no    Care Summary Note:     Writer phoned Patient for ACM update and to discuss cc plan of care.  Patient is aware of his upcoming appointment with Dr. Oliveira and Dr. Junior on 3/14/2025.  He states he scheduled transportation.    Patient is s/p Bronchoscopy with needle biopsy on 2025.  Patient kept appointment with Dr. Xiao on 3/4/2025.  Patient was diagnosed with       Squamous Cell Carcinoma of the Right Lower Lobe.  Patient has a history of lung cancer and states this isn't his first time dealing with this diagnosis.    Patient states he quit smoking.  He states he chews Nicorette gum.    Patient c/o chronic back pain.  He request a refill on Ibuprofen 800 mg.  Patient request a muscle relaxer for back discomfort.  Patient states he went to pain management in the past.  He states he continued to have pain after treatments with pain management.    Patient states his BG was 177 last night.  He has not tested today.    Patient denies concerns today.  Writer will follow up with Patient next week    Offered patient enrollment in the Remote Patient Monitoring (RPM) program for in-home monitoring: Yes, but did not enroll at this time: controlled chronic disease management.     Assessments Completed:   Diabetes Assessment      Meal Planning: None   How often do you test your blood

## 2025-03-11 NOTE — TELEPHONE ENCOUNTER
Name: Vic Nunez .  : 1960  MRN: [id number]    Oncology Navigation Follow-Up Note    Contact Type:  Medical Oncology    Notes: Navigator noting pt. Missed RO appt. Due to failure to arrange transportation.  Writer spoke with Bryan ALEXANDRA and requested assistance with transportation.      Electronically signed by Jessica Moeller RN on 3/11/2025 at 4:17 PM

## 2025-03-12 ENCOUNTER — CARE COORDINATION (OUTPATIENT)
Dept: CARE COORDINATION | Age: 65
End: 2025-03-12

## 2025-03-12 NOTE — CARE COORDINATION
HC attempted to contact the patient regarding his medical transportation,  and also his need for the ACP.    There was no answer, HC was unable to  leave a message for the patient.      Plan of Care  HC will attempt to reach patient in a few days

## 2025-03-13 ENCOUNTER — TELEPHONE (OUTPATIENT)
Dept: ONCOLOGY | Age: 65
End: 2025-03-13

## 2025-03-13 ENCOUNTER — CARE COORDINATION (OUTPATIENT)
Dept: CARE COORDINATION | Age: 65
End: 2025-03-13

## 2025-03-13 NOTE — CARE COORDINATION
Ambulatory Care Coordination Note     3/13/2025 4:03 PM     Patient Current Location:  Home: [address] [place] [address] [address] [place] [address]     ACM contacted the patient by telephone. Verified name and  with patient as identifiers.       ACM: Raúl Ospina RN     Challenges to be reviewed by the provider   Additional needs identified to be addressed with provider No  none               Method of communication with provider: staff message.    Utilization: Has the patient been seen in the ED since your last call? no    Care Summary Note:      Patient c/o chronic back pain. He request a refill on Ibuprofen 800 mg. Patient request a muscle relaxer for back discomfort. Patient states he went to pain management in the past. He states he continued to have pain after treatments with pain management.     PCP was notified and recommend Patient schedules an appointment.  Patient was notified.    Patient states he's currently at the ED with his child.  He request Writer send the office number to him via text message.  This was done.      Offered patient enrollment in the Remote Patient Monitoring (RPM) program for in-home monitoring: Yes, but did not enroll at this time: controlled chronic disease management.     Assessments Completed:   No changes since last call    Medications Reviewed:   Completed during a previous call     Advance Care Planning:   Not reviewed during this call     Care Planning:   Not completed during this call    PCP/Specialist follow up:   Future Appointments         Provider Specialty Dept Phone    3/14/2025 9:45 AM Henri Oliveira MD Oncology [phone]    3/14/2025 11:00 AM Chanelle Junior MD; SCHEDULE, [facility] NURSE Radiation Oncology [phone]    4/15/2025 10:00 AM Yan Xiao MD Pulmonology [phone]            Follow Up:   Plan for next [facility] outreach in approximately 1 week to complete:  - disease specific assessments  - SDOH assessments  - medication review   -

## 2025-03-13 NOTE — TELEPHONE ENCOUNTER
received referral from Nurse Navigator stating transportation concerns.  called patient but unable to leave message x2. Per chart review, multiple providers have spoken with patient about appointment tomorrow and he has confirmed transportation scheduled.

## 2025-03-14 ENCOUNTER — INITIAL CONSULT (OUTPATIENT)
Dept: ONCOLOGY | Age: 65
End: 2025-03-14

## 2025-03-14 ENCOUNTER — HOSPITAL ENCOUNTER (OUTPATIENT)
Dept: RADIATION ONCOLOGY | Age: 65
Discharge: HOME | End: 2025-03-14
Payer: MEDICARE

## 2025-03-14 ENCOUNTER — CARE COORDINATION (OUTPATIENT)
Dept: CARE COORDINATION | Age: 65
End: 2025-03-14

## 2025-03-14 VITALS
RESPIRATION RATE: 14 BRPM | HEIGHT: 72 IN | BODY MASS INDEX: 26.63 KG/M2 | HEART RATE: 61 BPM | SYSTOLIC BLOOD PRESSURE: 152 MMHG | DIASTOLIC BLOOD PRESSURE: 87 MMHG | WEIGHT: 196.6 LBS

## 2025-03-14 VITALS
BODY MASS INDEX: 26.64 KG/M2 | HEART RATE: 76 BPM | SYSTOLIC BLOOD PRESSURE: 152 MMHG | WEIGHT: 196.4 LBS | DIASTOLIC BLOOD PRESSURE: 87 MMHG | OXYGEN SATURATION: 98 % | TEMPERATURE: 97.6 F | RESPIRATION RATE: 16 BRPM

## 2025-03-14 DIAGNOSIS — C78.01: ICD-10-CM

## 2025-03-14 DIAGNOSIS — C44.92: Primary | ICD-10-CM

## 2025-03-14 DIAGNOSIS — C78.01: Primary | ICD-10-CM

## 2025-03-14 DIAGNOSIS — C44.92: ICD-10-CM

## 2025-03-14 PROCEDURE — 99213 OFFICE O/P EST LOW 20 MIN: CPT | Performed by: RADIOLOGY

## 2025-03-14 ASSESSMENT — PATIENT HEALTH QUESTIONNAIRE - PHQ9
2. FEELING DOWN, DEPRESSED OR HOPELESS: NOT AT ALL
SUM OF ALL RESPONSES TO PHQ QUESTIONS 1-9: 0
SUM OF ALL RESPONSES TO PHQ QUESTIONS 1-9: 0
1. LITTLE INTEREST OR PLEASURE IN DOING THINGS: NOT AT ALL
SUM OF ALL RESPONSES TO PHQ QUESTIONS 1-9: 0
SUM OF ALL RESPONSES TO PHQ QUESTIONS 1-9: 0

## 2025-03-14 ASSESSMENT — PAIN DESCRIPTION - LOCATION: LOCATION: BACK

## 2025-03-14 ASSESSMENT — PAIN DESCRIPTION - ORIENTATION: ORIENTATION: LOWER

## 2025-03-14 NOTE — PROGRESS NOTES
Referring Physician:  Dr. Morgan      Vitals:    03/14/25 0930   BP: (!) 152/87   Pulse: 76   Resp: 16   Temp: 97.6 °F (36.4 °C)   SpO2: 98%    :     Pain Assessment: 0-10          Wt Readings from Last 1 Encounters:   03/14/25 89.1 kg (196 lb 6.4 oz)        Body mass index is 26.64 kg/m².              Smoking Status:    [] Smoker - PPD:   [x] Nonsmoker - Quit Date:  2024             [] Never a smoker      No chief complaint on file.       Cancer Staging   No matching staging information was found for the patient.      Prior Radiation Therapy? No   If yes, site treated:   Facility:                             Date:    Concurrent Chemo/radiation? Possibly    Pacemaker/Defibrillator/ICD:  No    Do you have any musculoskeletal diseases, Lupus or arthritic conditions?  No   If yes, are you currently taking Methotrexate?  []  Yes  [x]  No                 Current Outpatient Medications   Medication Sig Dispense Refill    nicotine (NICODERM CQ) 14 MG/24HR Place 1 patch onto the skin every 24 hours Covered by pt's insurance! (Patient not taking: Reported on 3/11/2025)      DULoxetine (CYMBALTA) 30 MG extended release capsule TAKE ONE CAPSULE BY MOUTH ONCE A DAY (Patient not taking: Reported on 3/11/2025) 90 capsule 0    budesonide-formoterol (SYMBICORT) 160-4.5 MCG/ACT AERO Inhale 2 puffs into the lungs 2 times daily 10.2 g 3    albuterol sulfate HFA (VENTOLIN HFA) 108 (90 Base) MCG/ACT inhaler Inhale 2 puffs into the lungs every 6 hours as needed for Wheezing or Shortness of Breath 18 g 5    atorvastatin (LIPITOR) 20 MG tablet Take 1 tablet by mouth daily 100 tablet 0    nicotine (NICODERM CQ) 14 MG/24HR Place 1 patch onto the skin every 24 hours (Patient not taking: Reported on 1/22/2025) 28 patch 0    nicotine polacrilex (NICORETTE) 2 MG gum Take 1 each by mouth as needed for Smoking cessation 110 each 3    vitamin D (ERGOCALCIFEROL) 1.25 MG (14202 UT) CAPS capsule Take 1 capsule by mouth once a week 12 capsule 1     Mart-1 - Negative Histology Text: MART-1 staining demonstrates a normal density and pattern of melanocytes along the dermal-epidermal junction. The surgical margins are negative for tumor cells.

## 2025-03-14 NOTE — PATIENT INSTRUCTIONS
Dr Emerson for [facility]  Start Chemoradiation  RV one week after starting  chemoradiation

## 2025-03-14 NOTE — CARE COORDINATION
HC received information from Raúl hernandez, who stated  that the Oncology Social Worker will be assisting the patient at this  time.  HC phoned the patient and presented this information.  Patient  stated an understanding.      Plan of Care  HC will sign off of patient

## 2025-03-14 NOTE — ACP (ADVANCE CARE PLANNING)
Advance Care Planning     Hospice Services: Patient is not currently receiving hospice services/has not received hospice care within the performance year.    Advance Care Planning was discussed with patient, and he does not have ACP documents.  Patient requested assistance and packet give to patient to review. Patient guided to bring back after completing and can place referral to  for further help.  Decision Makers otherwise listed in chart.

## 2025-03-14 NOTE — CONSULTS
[facility]            Radiation Oncology          [address] [place] [address]        O: [phone]        F: [phone]       [facility]             3/14/2025    Patient: Vic Nunez Sr.   YOB: 1960       Dear Dr Morgan:    Thank you for referring Vic Nunez Sr. to me for evaluation. Below are the relevant portions of my assessment and plan of care. If you have questions, please do not hesitate to call me. I look forward to following this patient along with you.     Sincerely,    Electronically signed by Chanelle Junior MD on 3/14/2025 at 9:55 AM    CC: Patient Care Team:  Nakia Alexander MD as PCP - General (Family Medicine)  Henri Oliveira MD as Consulting Physician (Hematology and Oncology)  Raúl Ospina, RN as Ambulatory Care Manager  Davonte Mathew as Health   Isabel Marcano (Licensed Clinical )  London Michel MD as Consulting Physician (Pulmonary Disease)  Yan Xiao MD as Consulting Physician (Pulmonology)  Jessica Moeller, DAVONTE as Nurse Navigator (Oncology)  ------------------------------------------------------------------------------------------------------------------------------------------------------------------------------------------      RADIATION ONCOLOGY NEW PATIENT VISIT:    Date of Service: 3/14/2025    Location:  [facility] Radiation Oncology,   [address] [facility], [place] [phone]     Patient ID:   Vic Nunez Sr.  : 1960   MRN: [id number]    CHIEF COMPLAINT: \"I am here to discuss radiation\"    DIAGNOSIS:  Squamous cell carcinoma of the right lower lobe T3 N0 M0    HISTORY OF PRESENT ILLNESS:   Vic Nunez Sr. is a 64 y.o. male with prior history of left upper lobe carcinoma status post lobectomy, and recent imaging which shows expeditious abnormality in the right lung with multiple nodules, FDG PET close continue showed FDG avid lesion in

## 2025-03-17 DIAGNOSIS — C34.91 MALIGNANT NEOPLASM OF RIGHT LUNG, UNSPECIFIED PART OF LUNG (HCC): Primary | ICD-10-CM

## 2025-03-18 ENCOUNTER — CARE COORDINATION (OUTPATIENT)
Dept: CARE COORDINATION | Age: 65
End: 2025-03-18

## 2025-03-18 ENCOUNTER — TELEPHONE (OUTPATIENT)
Dept: RADIATION ONCOLOGY | Age: 65
End: 2025-03-18

## 2025-03-18 SDOH — HEALTH STABILITY: PHYSICAL HEALTH

## 2025-03-18 SDOH — ECONOMIC STABILITY: FOOD INSECURITY: WITHIN THE PAST 12 MONTHS, THE FOOD YOU BOUGHT JUST DIDN'T LAST AND YOU DIDN'T HAVE MONEY TO GET MORE.: OFTEN TRUE

## 2025-03-18 SDOH — ECONOMIC STABILITY: FOOD INSECURITY: WITHIN THE PAST 12 MONTHS, YOU WORRIED THAT YOUR FOOD WOULD RUN OUT BEFORE YOU GOT MONEY TO BUY MORE.: OFTEN TRUE

## 2025-03-18 SDOH — ECONOMIC STABILITY: INCOME INSECURITY: IN THE LAST 12 MONTHS, WAS THERE A TIME WHEN YOU WERE NOT ABLE TO PAY THE MORTGAGE OR RENT ON TIME?: NO

## 2025-03-18 SDOH — HEALTH STABILITY: PHYSICAL HEALTH: ON AVERAGE, HOW MANY MINUTES DO YOU ENGAGE IN EXERCISE AT THIS LEVEL?: 20 MIN

## 2025-03-18 SDOH — ECONOMIC STABILITY: TRANSPORTATION INSECURITY
IN THE PAST 12 MONTHS, HAS LACK OF TRANSPORTATION KEPT YOU FROM MEETINGS, WORK, OR FROM GETTING THINGS NEEDED FOR DAILY LIVING?: YES

## 2025-03-18 SDOH — ECONOMIC STABILITY: TRANSPORTATION INSECURITY
IN THE PAST 12 MONTHS, HAS THE LACK OF TRANSPORTATION KEPT YOU FROM MEDICAL APPOINTMENTS OR FROM GETTING MEDICATIONS?: YES

## 2025-03-18 ASSESSMENT — SOCIAL DETERMINANTS OF HEALTH (SDOH)
ARE YOU MARRIED, WIDOWED, DIVORCED, SEPARATED, NEVER MARRIED, OR LIVING WITH A PARTNER?: NEVER MARRIED
HOW OFTEN DO YOU ATTEND CHURCH OR RELIGIOUS SERVICES?: NEVER
HOW OFTEN DO YOU ATTENT MEETINGS OF THE CLUB OR ORGANIZATION YOU BELONG TO?: NEVER
DO YOU BELONG TO ANY CLUBS OR ORGANIZATIONS SUCH AS CHURCH GROUPS UNIONS, FRATERNAL OR ATHLETIC GROUPS, OR SCHOOL GROUPS?: NO
HOW OFTEN DO YOU GET TOGETHER WITH FRIENDS OR RELATIVES?: MORE THAN THREE TIMES A WEEK

## 2025-03-18 NOTE — TELEPHONE ENCOUNTER
Unable to reach pt, therefore, called pt contact Iza, who answered. Requested Iza to remind pt of his appt with us on 3/21/25 @10 a.m., also if pt needs help with transportation to that appt to call me back and advise.

## 2025-03-18 NOTE — CARE COORDINATION
management.     Assessments Completed:   Diabetes Assessment      Meal Planning: None   How often do you test your blood sugar?: Daily   Do you have barriers with adherence to non-pharmacologic self-management interventions? (Nutrition/Exercise/Self-Monitoring): No   Have you ever had to go to the ED for symptoms of low blood sugar?: No       No patient-reported symptoms         ,   COPD Assessment    Does the patient understand envrionmental exposure?: Yes  Is the patient able to verbalize Rescue vs. Long Acting medications?: Yes  Does the patient have a nebulizer?: No  Does the patient use a space with inhaled medications?: No            Symptoms:  None: Yes      Change in chronic cough?: No/At Baseline  Change in sputum?: No/At Baseline      ,   General Assessment              Medications Reviewed:   Patient denies any changes with medications and reports taking all medications as prescribed.    Advance Care Planning:   Not on file; education provided  Education provided by Jane Barrientos RN.  Writer reinforced education     Care Planning:   Education Documentation  Lifestyle Changes/Goal Setting, taught by Raúl Ospina RN at 3/19/2025  8:59 AM.  Learner: Patient  Readiness: Acceptance  Method: Explanation  Response: Verbalizes Understanding  Comment: Patient dx with squamous cell lung cancer.  He is scheduled to begin radiation on 3/21/2025.  Support given to Patient.    Educate reporting changes in condition, taught by Raúl Ospina RN at 3/19/2025  8:59 AM.  Learner: Patient  Readiness: Acceptance  Method: Explanation  Response: Verbalizes Understanding  Comment: Patient dx with squamous cell lung cancer.  He is scheduled to begin radiation on 3/21/2025.  Support given to Patient.    Educate Patient on When to Call for Symptoms, taught by Raúl Ospina RN at 3/19/2025  8:59 AM.  Learner: Patient  Readiness: Acceptance  Method: Explanation  Response: Verbalizes Understanding  Comment: Patient dx

## 2025-03-19 ENCOUNTER — TELEPHONE (OUTPATIENT)
Dept: ONCOLOGY | Age: 65
End: 2025-03-19

## 2025-03-19 PROBLEM — C78.01: Status: ACTIVE | Noted: 2025-03-19

## 2025-03-19 PROBLEM — C44.92: Status: ACTIVE | Noted: 2025-03-19

## 2025-03-19 RX ORDER — PALONOSETRON 0.05 MG/ML
0.25 INJECTION, SOLUTION INTRAVENOUS ONCE
OUTPATIENT
Start: 2025-03-19 | End: 2025-03-19

## 2025-03-19 RX ORDER — ACETAMINOPHEN 325 MG/1
650 TABLET ORAL
OUTPATIENT
Start: 2025-03-19

## 2025-03-19 RX ORDER — MEPERIDINE HYDROCHLORIDE 50 MG/ML
12.5 INJECTION INTRAMUSCULAR; INTRAVENOUS; SUBCUTANEOUS PRN
OUTPATIENT
Start: 2025-03-19

## 2025-03-19 RX ORDER — DIPHENHYDRAMINE HYDROCHLORIDE 50 MG/ML
50 INJECTION, SOLUTION INTRAMUSCULAR; INTRAVENOUS ONCE
OUTPATIENT
Start: 2025-03-19 | End: 2025-03-19

## 2025-03-19 RX ORDER — SODIUM CHLORIDE 0.9 % (FLUSH) 0.9 %
5-40 SYRINGE (ML) INJECTION PRN
OUTPATIENT
Start: 2025-03-19

## 2025-03-19 RX ORDER — HEPARIN SODIUM (PORCINE) LOCK FLUSH IV SOLN 100 UNIT/ML 100 UNIT/ML
500 SOLUTION INTRAVENOUS PRN
OUTPATIENT
Start: 2025-03-19

## 2025-03-19 RX ORDER — FAMOTIDINE 10 MG/ML
20 INJECTION, SOLUTION INTRAVENOUS
OUTPATIENT
Start: 2025-03-19

## 2025-03-19 RX ORDER — SODIUM CHLORIDE 9 MG/ML
5-250 INJECTION, SOLUTION INTRAVENOUS PRN
OUTPATIENT
Start: 2025-03-19

## 2025-03-19 RX ORDER — HYDROCORTISONE SODIUM SUCCINATE 100 MG/2ML
100 INJECTION INTRAMUSCULAR; INTRAVENOUS
OUTPATIENT
Start: 2025-03-19

## 2025-03-19 RX ORDER — ALBUTEROL SULFATE 90 UG/1
4 INHALANT RESPIRATORY (INHALATION) PRN
OUTPATIENT
Start: 2025-03-19

## 2025-03-19 RX ORDER — DIPHENHYDRAMINE HYDROCHLORIDE 50 MG/ML
50 INJECTION, SOLUTION INTRAMUSCULAR; INTRAVENOUS
OUTPATIENT
Start: 2025-03-19

## 2025-03-19 RX ORDER — ONDANSETRON 2 MG/ML
8 INJECTION INTRAMUSCULAR; INTRAVENOUS
OUTPATIENT
Start: 2025-03-19

## 2025-03-19 RX ORDER — EPINEPHRINE 1 MG/ML
0.3 INJECTION, SOLUTION, CONCENTRATE INTRAVENOUS PRN
OUTPATIENT
Start: 2025-03-19

## 2025-03-19 RX ORDER — FAMOTIDINE 10 MG/ML
20 INJECTION, SOLUTION INTRAVENOUS ONCE
OUTPATIENT
Start: 2025-03-19 | End: 2025-03-19

## 2025-03-19 RX ORDER — SODIUM CHLORIDE 9 MG/ML
INJECTION, SOLUTION INTRAVENOUS CONTINUOUS
OUTPATIENT
Start: 2025-03-19

## 2025-03-19 NOTE — TELEPHONE ENCOUNTER
set up ride through insurance provider for 3/21. Patient updated.  will try to check in at appointment.     Transportation details:  [facility] [phone]   9:00am  Confirmation [id number]  Call for return ride

## 2025-03-19 NOTE — PROGRESS NOTES
Quit smok one year    Back pain scan  
to see the patient, face-to-face patient care, completing clinical documentation, obtaining and/or reviewing separately obtained history, performing a medically appropriate examination, counseling and educating the patient/family/caregiver, ordering medications, tests, or procedures, communicating with other HCPs (not separately reported), independently interpreting results (not separately reported), communicating results to the patient/family/caregiver and care coordination (not separately reported).                          MD Augustine [facility]                            This note is created with the assistance of a speech recognition program.  While intending to generate a document that actually reflects the content of the visit, the document can still have some errors including those of syntax and sound a like substitutions which may escape proof reading.  It such instances, actual meaning can be extrapolated by contextual diversion.

## 2025-03-21 ENCOUNTER — HOSPITAL ENCOUNTER (OUTPATIENT)
Age: 65
Discharge: HOME | End: 2025-03-21
Payer: MEDICARE

## 2025-03-21 ENCOUNTER — HOSPITAL ENCOUNTER (OUTPATIENT)
Dept: RADIATION ONCOLOGY | Age: 65
Discharge: HOME | End: 2025-03-21
Payer: MEDICARE

## 2025-03-21 ENCOUNTER — TELEPHONE (OUTPATIENT)
Dept: ONCOLOGY | Age: 65
End: 2025-03-21

## 2025-03-21 DIAGNOSIS — C44.92: ICD-10-CM

## 2025-03-21 DIAGNOSIS — C34.91 MALIGNANT NEOPLASM OF RIGHT LUNG, UNSPECIFIED PART OF LUNG (HCC): ICD-10-CM

## 2025-03-21 DIAGNOSIS — C78.01: ICD-10-CM

## 2025-03-21 LAB
ALBUMIN SERPL-MCNC: 4.3 G/DL (ref 3.5–5.2)
ALBUMIN/GLOB SERPL: 1.5 {RATIO} (ref 1–2.5)
ALP SERPL-CCNC: 84 U/L (ref 40–129)
ALT SERPL-CCNC: 8 U/L (ref 5–41)
ANION GAP SERPL CALCULATED.3IONS-SCNC: 11 MMOL/L (ref 9–17)
AST SERPL-CCNC: 18 U/L
BASOPHILS # BLD: 0 K/UL (ref 0–0.2)
BASOPHILS NFR BLD: 1 % (ref 0–2)
BILIRUB SERPL-MCNC: 0.9 MG/DL (ref 0.3–1.2)
BUN SERPL-MCNC: 18 MG/DL (ref 8–23)
CALCIUM SERPL-MCNC: 9.3 MG/DL (ref 8.6–10.4)
CHLORIDE SERPL-SCNC: 103 MMOL/L (ref 98–107)
CO2 SERPL-SCNC: 24 MMOL/L (ref 20–31)
CREAT SERPL-MCNC: 1.1 MG/DL (ref 0.7–1.2)
EOSINOPHIL # BLD: 0.1 K/UL (ref 0–0.4)
EOSINOPHILS RELATIVE PERCENT: 2 % (ref 1–4)
ERYTHROCYTE [DISTWIDTH] IN BLOOD BY AUTOMATED COUNT: 13.7 % (ref 12.5–15.4)
GFR, ESTIMATED: 74 ML/MIN/1.73M2
GLUCOSE SERPL-MCNC: 110 MG/DL (ref 70–99)
HCT VFR BLD AUTO: 44.2 % (ref 41–53)
HGB BLD-MCNC: 15 G/DL (ref 13.5–17.5)
LYMPHOCYTES NFR BLD: 2.6 K/UL (ref 1–4.8)
LYMPHOCYTES RELATIVE PERCENT: 40 % (ref 24–44)
MCH RBC QN AUTO: 31.2 PG (ref 26–34)
MCHC RBC AUTO-ENTMCNC: 34 G/DL (ref 31–37)
MCV RBC AUTO: 91.7 FL (ref 80–100)
MONOCYTES NFR BLD: 0.7 K/UL (ref 0.1–1.2)
MONOCYTES NFR BLD: 10 % (ref 2–11)
NEUTROPHILS NFR BLD: 47 % (ref 36–66)
NEUTS SEG NFR BLD: 3.1 K/UL (ref 1.8–7.7)
PLATELET # BLD AUTO: 253 K/UL (ref 140–450)
PMV BLD AUTO: 6.8 FL (ref 6–12)
POTASSIUM SERPL-SCNC: 4.1 MMOL/L (ref 3.7–5.3)
PROT SERPL-MCNC: 7.2 G/DL (ref 6.4–8.3)
RBC # BLD AUTO: 4.81 M/UL (ref 4.5–5.9)
SODIUM SERPL-SCNC: 138 MMOL/L (ref 135–144)
WBC OTHER # BLD: 6.4 K/UL (ref 3.5–11)

## 2025-03-21 PROCEDURE — 80053 COMPREHEN METABOLIC PANEL: CPT

## 2025-03-21 PROCEDURE — 85025 COMPLETE CBC W/AUTO DIFF WBC: CPT

## 2025-03-21 PROCEDURE — 77470 SPECIAL RADIATION TREATMENT: CPT | Performed by: RADIOLOGY

## 2025-03-21 PROCEDURE — 36415 COLL VENOUS BLD VENIPUNCTURE: CPT

## 2025-03-21 PROCEDURE — 77334 RADIATION TREATMENT AID(S): CPT | Performed by: RADIOLOGY

## 2025-03-21 NOTE — DISCHARGE INSTRUCTIONS
Chest Side Effects  Fatigue  Skin Changes  Throat changes, such as trouble swallowing  Cough  Shortness of breath    Ways to Manage Fatigue    Try to sleep at least 8 hours each night. This may be more sleep than you needed before radiation therapy. One way to sleep better at night is to be active during the day. Another way is to relax right before going to bed. Do calming activities before bedtime, such as reading, working on a Gamelet puzzle, or listening to music.  Plan time to rest. Take short naps or rest breaks between activities.  Try not to do too much. With fatigue, you may not have enough energy to do all the things you want to do. Stay active, but choose the activities that are most important to you. Try to let go of things that don't matter as much now. For example, you might go to work but not do housework. You might watch your children's sports events but not cook dinner.  Exercise. Research shows that most people feel better when they get some exercise each day. Go for a short walk, ride a bike, or do yoga. Talk with your doctor or nurse about types of exercise you can do while having radiation therapy.  Relax. Mediatation, prayer, gentle yoga, guided imagery, and visualization are ways you can learn to relax and decrease stress.    For relaxation exercises:  Visit Learning to Relax on the National Cancer [place]'s website at : www.cancer.gov/about-cancer/copingfeelings/relaxation  See Facing Forward: Life After Cancer Treatment at: www.cancer.gov/publications/patient-education/facing-forward  Eat and drink well. It can be easier to eat if you have 5 or 6 small meals each day, rather than 3 large ones. Keep foods around that are easy to fix, such as canned soups, frozen meals, yogurt, and cottage cheese. Drink plenty of fluids each day-about 8 cups of water or juice.  Plan a work schedule that is right for you.Fatigue may affect the amount of energy you have for your job. You may feel well enough to

## 2025-03-21 NOTE — PROGRESS NOTES
Radiation Treatment Site/Plan/Fractions:  Right Lung/33 Fractions Daily      Concurrent Chemotherapy/Immunotherapy:  Yes, concurrent Carbo/Taxol per Dr. Oliveira      Cardiac Device:  None      Transportation Concerns:  Yes-previous referral to  for help with rides      Nursing Referrals and Reasons:  No other needs identified at this time      Contrast Given:  Yes, received 92 mls total of Isovue 370 via right forearm IV.  Patient tolerated well with good blood return to IV.  Contrast Checklist completed and scanned to chart as well.          Miscellaneous Information:  Site specific information reviewed with patient and fiancee.  Both verbalized understanding of information but will likely need support and more reinforcement.  Patient guided port must be placed on left side of chest and writer messaged navigator as well for assistance with coordination/awareness.

## 2025-03-21 NOTE — TELEPHONE ENCOUNTER
met with patient during radiation appointment. Patient states biggest concern right now is transportation once radiation starts. Patient has been setting up rides but with daily need wants extra help.  will assist with rides moving forward.  reviewed role and local resources.  will continue to follow.

## 2025-03-25 ENCOUNTER — HOSPITAL ENCOUNTER (OUTPATIENT)
Dept: RADIATION ONCOLOGY | Age: 65
Discharge: HOME | End: 2025-03-25
Payer: MEDICARE

## 2025-03-25 PROCEDURE — 77300 RADIATION THERAPY DOSE PLAN: CPT | Performed by: RADIOLOGY

## 2025-03-25 PROCEDURE — 77338 DESIGN MLC DEVICE FOR IMRT: CPT | Performed by: RADIOLOGY

## 2025-03-25 PROCEDURE — 77301 RADIOTHERAPY DOSE PLAN IMRT: CPT | Performed by: RADIOLOGY

## 2025-03-27 ENCOUNTER — INITIAL CONSULT (OUTPATIENT)
Dept: VASCULAR SURGERY | Age: 65
End: 2025-03-27
Payer: MEDICARE

## 2025-03-27 VITALS
WEIGHT: 196 LBS | BODY MASS INDEX: 26.55 KG/M2 | HEART RATE: 79 BPM | DIASTOLIC BLOOD PRESSURE: 77 MMHG | SYSTOLIC BLOOD PRESSURE: 139 MMHG | OXYGEN SATURATION: 96 % | HEIGHT: 72 IN

## 2025-03-27 DIAGNOSIS — Z45.2 ENCOUNTER FOR INSERTION OF TUNNELED CENTRAL VENOUS CATHETER (CVC) WITH PORT: Primary | ICD-10-CM

## 2025-03-27 PROCEDURE — G8427 DOCREV CUR MEDS BY ELIG CLIN: HCPCS | Performed by: SURGERY

## 2025-03-27 PROCEDURE — 3017F COLORECTAL CA SCREEN DOC REV: CPT | Performed by: SURGERY

## 2025-03-27 PROCEDURE — 1159F MED LIST DOCD IN RCRD: CPT | Performed by: SURGERY

## 2025-03-27 PROCEDURE — 1125F AMNT PAIN NOTED PAIN PRSNT: CPT | Performed by: SURGERY

## 2025-03-27 PROCEDURE — 99203 OFFICE O/P NEW LOW 30 MIN: CPT | Performed by: SURGERY

## 2025-03-27 PROCEDURE — 1123F ACP DISCUSS/DSCN MKR DOCD: CPT | Performed by: SURGERY

## 2025-03-27 PROCEDURE — 1036F TOBACCO NON-USER: CPT | Performed by: SURGERY

## 2025-03-27 PROCEDURE — G8417 CALC BMI ABV UP PARAM F/U: HCPCS | Performed by: SURGERY
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
## 2025-03-31 ASSESSMENT — ENCOUNTER SYMPTOMS
ALLERGIC/IMMUNOLOGIC NEGATIVE: 1
BACK PAIN: 1
CHEST TIGHTNESS: 0
COLOR CHANGE: 0
SHORTNESS OF BREATH: 0
ABDOMINAL PAIN: 0

## 2025-03-31 NOTE — PROGRESS NOTES
Pre-procedure phone call completed. Reviewed arrival time  1300 , Entrance A and NPO. Confirmed that patient has an adult  and that they have someone available to stay with them if they are discharged to home afterward. Patient asked to bring a list of home medications. Patient verbalizes understanding.

## 2025-04-01 ENCOUNTER — HOSPITAL ENCOUNTER (OUTPATIENT)
Age: 65
Setting detail: OUTPATIENT SURGERY
Discharge: HOME | End: 2025-04-01
Attending: SURGERY | Admitting: SURGERY
Payer: MEDICARE

## 2025-04-01 VITALS
TEMPERATURE: 97.6 F | OXYGEN SATURATION: 99 % | RESPIRATION RATE: 18 BRPM | SYSTOLIC BLOOD PRESSURE: 145 MMHG | HEART RATE: 60 BPM | BODY MASS INDEX: 27.15 KG/M2 | WEIGHT: 200.18 LBS | DIASTOLIC BLOOD PRESSURE: 88 MMHG

## 2025-04-01 DIAGNOSIS — C34.90 LUNG CANCER (HCC): ICD-10-CM

## 2025-04-01 PROCEDURE — 99152 MOD SED SAME PHYS/QHP 5/>YRS: CPT | Performed by: SURGERY

## 2025-04-01 PROCEDURE — 76937 US GUIDE VASCULAR ACCESS: CPT | Performed by: SURGERY

## 2025-04-01 PROCEDURE — 36561 INSERT TUNNELED CV CATH: CPT | Performed by: SURGERY

## 2025-04-01 PROCEDURE — 77001 FLUOROGUIDE FOR VEIN DEVICE: CPT | Performed by: SURGERY

## 2025-04-01 PROCEDURE — 99153 MOD SED SAME PHYS/QHP EA: CPT | Performed by: SURGERY

## 2025-04-01 PROCEDURE — C1788 PORT, INDWELLING, IMP: HCPCS | Performed by: SURGERY

## 2025-04-01 PROCEDURE — 7100000011 HC PHASE II RECOVERY - ADDTL 15 MIN: Performed by: SURGERY

## 2025-04-01 PROCEDURE — 2709999900 HC NON-CHARGEABLE SUPPLY: Performed by: SURGERY

## 2025-04-01 PROCEDURE — 6360000002 HC RX W HCPCS: Performed by: SURGERY

## 2025-04-01 PROCEDURE — 7100000010 HC PHASE II RECOVERY - FIRST 15 MIN: Performed by: SURGERY

## 2025-04-01 DEVICE — POWERPORT CLEARVUE ISP IMPLANTABLE PORT WITH ATTACHABLE 8F POLYURETHANE OPEN-ENDED SINGLE-LUMEN VENOUS CATHETER PROCEDURAL KIT
Type: IMPLANTABLE DEVICE | Status: FUNCTIONAL
Brand: POWERPORT CLEARVUE

## 2025-04-01 RX ORDER — SODIUM CHLORIDE 0.9 % (FLUSH) 0.9 %
5-40 SYRINGE (ML) INJECTION EVERY 12 HOURS SCHEDULED
Status: DISCONTINUED | OUTPATIENT
Start: 2025-04-01 | End: 2025-04-01 | Stop reason: HOSPADM

## 2025-04-01 RX ORDER — SODIUM CHLORIDE 0.9 % (FLUSH) 0.9 %
5-40 SYRINGE (ML) INJECTION PRN
Status: DISCONTINUED | OUTPATIENT
Start: 2025-04-01 | End: 2025-04-01 | Stop reason: HOSPADM

## 2025-04-01 RX ORDER — CEFAZOLIN SODIUM 1 G/3ML
INJECTION, POWDER, FOR SOLUTION INTRAMUSCULAR; INTRAVENOUS PRN
Status: DISCONTINUED | OUTPATIENT
Start: 2025-04-01 | End: 2025-04-01 | Stop reason: HOSPADM

## 2025-04-01 RX ORDER — SODIUM CHLORIDE 9 MG/ML
INJECTION, SOLUTION INTRAVENOUS PRN
Status: DISCONTINUED | OUTPATIENT
Start: 2025-04-01 | End: 2025-04-01 | Stop reason: HOSPADM

## 2025-04-01 RX ORDER — MIDAZOLAM 1 MG/ML
INJECTION INTRAMUSCULAR; INTRAVENOUS PRN
Status: DISCONTINUED | OUTPATIENT
Start: 2025-04-01 | End: 2025-04-01 | Stop reason: HOSPADM

## 2025-04-01 RX ORDER — VITAMIN B COMPLEX
1000 TABLET ORAL DAILY
COMMUNITY

## 2025-04-01 RX ORDER — LIDOCAINE HYDROCHLORIDE 10 MG/ML
INJECTION, SOLUTION INFILTRATION; PERINEURAL PRN
Status: DISCONTINUED | OUTPATIENT
Start: 2025-04-01 | End: 2025-04-01 | Stop reason: HOSPADM

## 2025-04-01 NOTE — OP NOTE
Operative Note      Patient: Vic Nunez Sr.  YOB: 1960  MRN: [id number]    Date of Procedure: 4/1/2025    Pre-Op Diagnosis Codes:      * Lung cancer (HCC) [C34.90]    Post-Op Diagnosis: Same       Procedure:  1) US guided vascular access of right internal jugular vein  2) Placement of central venous catheter with subcutaneous port under fluoroscopy    Surgeon(s):  Marcy Emerson MD    Anesthesia: 40 ml 1% lidocaine, 2 mg versed, 50 mcg fentanyl    Estimated Blood Loss (mL): 11 ml    Complications: None    Specimens: none    Implants:  Implant Name Type Inv. Item Serial No.  Lot No. LRB No. Used Action   PORT INFUS SGL LUMN ATTCH POLYUR OPN END CATH 8FR POWERPRT - [id number]  PORT INFUS SGL LUMN ATTCH POLYUR OPN END CATH 8FR POWERPRT  [id number] N/A 1 Implanted       Drains: none    Findings:  Infection Present At Time Of Surgery (PATOS) (choose all levels that have infection present):  No infection present  Other Findings: Port draws and flushes easily, locked with concentrated heparin solution.  Catheter tip at atrial caval junction.    Detailed Description of Procedure:     Vic Nunez was brought to the [facility] catheterization suite for placement of central venous catheter with subcutaneous port.  Ultrasound used to evaluate bilateral internal jugular veins.  After appropriate timeout was performed, the right neck and chest were prepped and draped in standard sterile fashion.  Antibiotics given during this period.  I began by evaluating the right internal jugular vein under ultrasound, it was patent and compressible.  Local anesthesia delivered and under ultrasound guidance using a micropuncture needle the right internal jugular vein was accessed, permanent ultrasound images saved, and a wire sent down the cava.  Next the chest was anesthetized, and a small transverse incision created with scalpel and extended down with cautery.  A pocket was

## 2025-04-01 NOTE — DISCHARGE INSTRUCTIONS
Remove band aid in 1-2 days    Ok to shower in 1-2 days, pat dry over steristrips    Remove steristrips in 5-6 days    Ok to access port for chemotherapy    Tylenol or motrin as needed for pain/discomfort    Call Dr. Emerson if there are any problems or concerns [phone]

## 2025-04-01 NOTE — H&P
extremity/head/neck swelling discussed  Questions answered and patient consented to proceed  Plan to proceed with port placement, side to be determined based on duplex intraoperatively   Guilt tripped him regarding smoking cessation (he came in with his angelina suarezler)

## 2025-04-02 ENCOUNTER — CARE COORDINATION (OUTPATIENT)
Dept: CARE COORDINATION | Age: 65
End: 2025-04-02

## 2025-04-02 ENCOUNTER — TELEPHONE (OUTPATIENT)
Dept: ONCOLOGY | Age: 65
End: 2025-04-02

## 2025-04-02 NOTE — TELEPHONE ENCOUNTER
Name: Vic Nunez .  : 1960  MRN: [id number]    Oncology Navigation Follow-Up Note    Contact Type:  Telephone    Notes: Navigator spoke with pt. Offering assistance if needed. Writer offering support since port placed yesterday.       Electronically signed by Jessica Moeller RN on 2025 at 2:54 PM

## 2025-04-02 NOTE — CARE COORDINATION
Ambulatory Care Coordination Note     2025 4:13 PM     Patient Current Location:  Home: [address] [place] [address] [address] [place] [address]     ACM contacted the patient by telephone. Verified name and  with patient as identifiers.      ACM: Raúl Ospina RN     Challenges to be reviewed by the provider   Additional needs identified to be addressed with provider No  none               Method of communication with provider: staff message.    Utilization: Has the patient been seen in the ED since your last call? no    Care Summary Note:     Patient is under the care of Dr. Emerson for lung cancer.  He had a Port placed yesterday in preparation for chemoradiation followed by immunotherapy treatment.  Writer phoned Patient today for ACM follow up.  Patient is optimistic and knowledgeable regarding his treatment plan.  He informed Writer that he has spoken with his Nurse navigator.  He was informed Writer will sign off for ambulatory care management at this time.  He was informed he will now received case management from his Navigator as well as IBRAHIMA Marin.  Patient states he has met Racheal.  He expressed understanding.  Patient states he is to begin treatments in 1 - 2 week.    Patient states Christine Pettit is his POA.  He states he let everyone know.  He was informed he needs to put this in writing.  He request the ACP paperwork get mailed to him.  Writer will request JASON Kaye  mail the paperwork to his home.    Offered patient enrollment in the Remote Patient Monitoring (RPM) program for in-home monitoring: Yes, but did not enroll at this time: controlled chronic disease management.     Assessments Completed:   Diabetes Assessment      Meal Planning: None   How often do you test your blood sugar?: Daily   Do you have barriers with adherence to non-pharmacologic self-management interventions? (Nutrition/Exercise/Self-Monitoring): No   Have you ever had to go to the ED for

## 2025-04-03 ENCOUNTER — CARE COORDINATION (OUTPATIENT)
Dept: CARE COORDINATION | Age: 65
End: 2025-04-03

## 2025-04-07 ENCOUNTER — TELEPHONE (OUTPATIENT)
Dept: INFUSION THERAPY | Age: 65
End: 2025-04-07

## 2025-04-07 NOTE — TELEPHONE ENCOUNTER
Jane, Radiation Oncology RN, reached out to me patient is ready to start chemoradiation next week, probably going to start radiation on Wed or Thurs, hoping he be able to do chemo on 4/18. Patient is scheduled for Chemo teach on 4/17/25 at 3 pm and Dr. Rivers and chemo on 4/18/25 at 8:45 am. Left message for RONNI Adams for assistance with transportation.

## 2025-04-08 ENCOUNTER — SOCIAL WORK (OUTPATIENT)
Dept: ONCOLOGY | Age: 65
End: 2025-04-08

## 2025-04-08 NOTE — PROGRESS NOTES
Called patient, he confirmed he has a ride for 4/15.  set up ride through insurance provider for 4/17-18 to [facility]. Patient updated.      Transportation details:  [facility] [phone]   1 pm 4/17   7:40 am 4/18  Confirmation #[id number]/ #[id number]  Call for return ride

## 2025-04-15 ENCOUNTER — TELEPHONE (OUTPATIENT)
Dept: ONCOLOGY | Age: 65
End: 2025-04-15

## 2025-04-15 ENCOUNTER — OFFICE VISIT (OUTPATIENT)
Dept: PULMONOLOGY | Age: 65
End: 2025-04-15
Payer: MEDICARE

## 2025-04-15 VITALS
TEMPERATURE: 96.8 F | HEART RATE: 79 BPM | BODY MASS INDEX: 27.17 KG/M2 | HEIGHT: 72 IN | WEIGHT: 200.6 LBS | RESPIRATION RATE: 18 BRPM | OXYGEN SATURATION: 94 % | SYSTOLIC BLOOD PRESSURE: 126 MMHG | DIASTOLIC BLOOD PRESSURE: 84 MMHG

## 2025-04-15 DIAGNOSIS — Z85.118 HISTORY OF LUNG CANCER: ICD-10-CM

## 2025-04-15 DIAGNOSIS — C34.31 MALIGNANT NEOPLASM OF LOWER LOBE OF RIGHT LUNG (HCC): Primary | ICD-10-CM

## 2025-04-15 DIAGNOSIS — J44.9 CHRONIC OBSTRUCTIVE PULMONARY DISEASE, UNSPECIFIED COPD TYPE (HCC): ICD-10-CM

## 2025-04-15 DIAGNOSIS — R91.1 PULMONARY NODULE: ICD-10-CM

## 2025-04-15 DIAGNOSIS — Z87.891 HISTORY OF SMOKING 30 OR MORE PACK YEARS: ICD-10-CM

## 2025-04-15 PROCEDURE — 1123F ACP DISCUSS/DSCN MKR DOCD: CPT | Performed by: INTERNAL MEDICINE

## 2025-04-15 PROCEDURE — 99214 OFFICE O/P EST MOD 30 MIN: CPT | Performed by: INTERNAL MEDICINE

## 2025-04-15 PROCEDURE — 1159F MED LIST DOCD IN RCRD: CPT | Performed by: INTERNAL MEDICINE

## 2025-04-15 PROCEDURE — 3023F SPIROM DOC REV: CPT | Performed by: INTERNAL MEDICINE

## 2025-04-15 PROCEDURE — 1036F TOBACCO NON-USER: CPT | Performed by: INTERNAL MEDICINE

## 2025-04-15 PROCEDURE — G8417 CALC BMI ABV UP PARAM F/U: HCPCS | Performed by: INTERNAL MEDICINE

## 2025-04-15 PROCEDURE — 3017F COLORECTAL CA SCREEN DOC REV: CPT | Performed by: INTERNAL MEDICINE

## 2025-04-15 PROCEDURE — G8427 DOCREV CUR MEDS BY ELIG CLIN: HCPCS | Performed by: INTERNAL MEDICINE

## 2025-04-15 RX ORDER — BUDESONIDE AND FORMOTEROL FUMARATE DIHYDRATE 160; 4.5 UG/1; UG/1
2 AEROSOL RESPIRATORY (INHALATION) 2 TIMES DAILY
Qty: 10.2 G | Refills: 11 | Status: SHIPPED | OUTPATIENT
Start: 2025-04-15

## 2025-04-15 NOTE — PROGRESS NOTES
lobe and a third of his lung per patient, along with lymph nodes (no records available). Despite this significant medical history, he has not followed up with his oncologist apparently after surgery partly because of transportation issues per patient.     He has a history of smoking, having recently quit 2 days ago. Prior to this, he was consuming 3 to 4 cigarettes daily, a reduction from his previous habit of 2 packs per day. He initiated smoking at the age of 6.      SOCIAL HISTORY  The patient has a history of smoking 30 or more pack-years but stopped smoking completely a few weeks ago.    MEDICATIONS  Current: albuterol, Symbicort      The patient is a 64-year-old male who presents for follow-up of lung cancer. He has a history of previous lung cancer, chronic obstructive pulmonary disease, a history of smoking 13 or more pack years, and a pulmonary nodule. 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 He was subsequently referred to oncology and radiation oncology.    He has been under the care of both oncology and radiation oncology departments. His treatment regimen includes concurrent chemoradiation therapy, scheduled to start on 04/17/2025 and 04/18/2025. He had a consultation with Dr. Villagran in April 2025 for port placement. He reports no changes in his condition since the last visit, with no symptoms of cough or wheezing. He also reports no chest pain, hemoptysis, or production of yellow-green phlegm. His appetite remains robust, and he has experienced weight gain.    He has a history of chronic back pain, which limits his ability to perform regular activities.    He uses Symbicort inhaler, taking 2 puffs twice daily, and ensures to rinse his mouth post-inhalation. He requires albuterol approximately twice a week, primarily when he experiences wheezing. He has abstained from smoking for

## 2025-04-15 NOTE — TELEPHONE ENCOUNTER
set up transportation for next week's appointments (4/21-25). Patient updated.    Transportation details:  [facility] [phone]   1:15pm 10:00am (4/25)  Confirmation [phone] [phone]  Return ride 3:00pm, call for ride 4/25

## 2025-04-17 ENCOUNTER — TELEPHONE (OUTPATIENT)
Dept: INFUSION THERAPY | Age: 65
End: 2025-04-17

## 2025-04-17 ENCOUNTER — HOSPITAL ENCOUNTER (OUTPATIENT)
Dept: RADIATION ONCOLOGY | Age: 65
Discharge: HOME | End: 2025-04-17
Payer: MEDICARE

## 2025-04-17 ENCOUNTER — OFFICE VISIT (OUTPATIENT)
Dept: ONCOLOGY | Age: 65
End: 2025-04-17
Payer: MEDICARE

## 2025-04-17 DIAGNOSIS — C78.01: Primary | ICD-10-CM

## 2025-04-17 DIAGNOSIS — C44.92: Primary | ICD-10-CM

## 2025-04-17 PROCEDURE — 1123F ACP DISCUSS/DSCN MKR DOCD: CPT | Performed by: INTERNAL MEDICINE

## 2025-04-17 PROCEDURE — 77386 HC NTSTY MODUL RAD TX DLVR CPLX: CPT | Performed by: RADIOLOGY

## 2025-04-17 PROCEDURE — 99213 OFFICE O/P EST LOW 20 MIN: CPT | Performed by: INTERNAL MEDICINE

## 2025-04-17 PROCEDURE — G8428 CUR MEDS NOT DOCUMENT: HCPCS | Performed by: INTERNAL MEDICINE

## 2025-04-17 PROCEDURE — 3017F COLORECTAL CA SCREEN DOC REV: CPT | Performed by: INTERNAL MEDICINE

## 2025-04-17 PROCEDURE — G8417 CALC BMI ABV UP PARAM F/U: HCPCS | Performed by: INTERNAL MEDICINE

## 2025-04-17 PROCEDURE — 1036F TOBACCO NON-USER: CPT | Performed by: INTERNAL MEDICINE

## 2025-04-17 RX ORDER — LIDOCAINE AND PRILOCAINE 25; 25 MG/G; MG/G
CREAM TOPICAL
Qty: 1 EACH | Refills: 2 | Status: SHIPPED | OUTPATIENT
Start: 2025-04-17

## 2025-04-17 RX ORDER — ONDANSETRON 8 MG/1
8 TABLET, ORALLY DISINTEGRATING ORAL EVERY 8 HOURS PRN
Qty: 90 TABLET | Refills: 3 | Status: SHIPPED | OUTPATIENT
Start: 2025-04-17

## 2025-04-17 NOTE — TELEPHONE ENCOUNTER
Chemotherapy orders received:    Wk=146.9 cm Wt=91 kg BSA= 2.15  Chemotherapy doses verified:     Taxol 45 mg/m2 = 97 mg  Carbo target AUC 2 dose TBD day of tx    Sveta Soto RN

## 2025-04-17 NOTE — PROGRESS NOTES
Vic and his daughter were here for chemotherapy teaching. Spent 45 minutes with them.  Teaching sheets from chemoexperts and verbal information given on chemotherapy agents, action, administration and side effects.    Chemotherapy medications discussed: taxol, carbo    Pregnancy warnings reviewed: N/A    Chemotherapy consent form signed by patient.    Provided new patient binder, Chemotherapy and You booklet, Eating Hints booklet      Port placement: 4/1/25  Pt has prescription for EMLA cream and was given instructions on use.    Reviewed take home medication: N/A    Questions answered and support given.    [facility] rehab referral assessment form, distress tool form and PG-SGA form completed by patient.    Needs that were identified during teaching visit: No needs identified at this time.    Discussed community resources such as [employer], [facility], [facility], [employer], American [employer], etc.    Pt will return on 4/18/25 for C1D1 taxol/carbp and f/u with Dr. Oliveira on 4/18/25.

## 2025-04-17 NOTE — TELEPHONE ENCOUNTER
Chemotherapy orders received:     Lz=410.9 cm Wt=91 kg BSA= 2.15  Chemotherapy doses verified:      Taxol 45 mg/m2 = 97 mg  Carbo target AUC 2 dose TBD day of tx     Marlene Knox RN

## 2025-04-18 ENCOUNTER — HOSPITAL ENCOUNTER (OUTPATIENT)
Dept: INFUSION THERAPY | Age: 65
Discharge: HOME | End: 2025-04-18
Payer: MEDICARE

## 2025-04-18 ENCOUNTER — HOSPITAL ENCOUNTER (OUTPATIENT)
Dept: RADIATION ONCOLOGY | Age: 65
Discharge: HOME | End: 2025-04-18
Payer: MEDICARE

## 2025-04-18 ENCOUNTER — OFFICE VISIT (OUTPATIENT)
Dept: ONCOLOGY | Age: 65
End: 2025-04-18
Payer: MEDICARE

## 2025-04-18 VITALS
DIASTOLIC BLOOD PRESSURE: 79 MMHG | HEART RATE: 66 BPM | SYSTOLIC BLOOD PRESSURE: 126 MMHG | RESPIRATION RATE: 18 BRPM | TEMPERATURE: 97.6 F | BODY MASS INDEX: 27.53 KG/M2 | WEIGHT: 203 LBS

## 2025-04-18 DIAGNOSIS — C44.92 SQUAMOUS CELL CARCINOMA METASTATIC TO BRONCHUS OF RIGHT LOWER LOBE (HCC): Primary | ICD-10-CM

## 2025-04-18 DIAGNOSIS — C44.92: Primary | ICD-10-CM

## 2025-04-18 DIAGNOSIS — C78.01 SQUAMOUS CELL CARCINOMA METASTATIC TO BRONCHUS OF RIGHT LOWER LOBE (HCC): Primary | ICD-10-CM

## 2025-04-18 DIAGNOSIS — C78.01: Primary | ICD-10-CM

## 2025-04-18 LAB
ALBUMIN SERPL-MCNC: 4.1 G/DL (ref 3.5–5.2)
ALBUMIN/GLOB SERPL: 1.5 {RATIO} (ref 1–2.5)
ALP SERPL-CCNC: 81 U/L (ref 40–129)
ALT SERPL-CCNC: 16 U/L (ref 5–41)
ANION GAP SERPL CALCULATED.3IONS-SCNC: 7 MMOL/L (ref 9–17)
AST SERPL-CCNC: 17 U/L
BASOPHILS # BLD: 0 K/UL (ref 0–0.2)
BASOPHILS NFR BLD: 1 % (ref 0–2)
BILIRUB SERPL-MCNC: 0.6 MG/DL (ref 0.3–1.2)
BUN SERPL-MCNC: 13 MG/DL (ref 8–23)
CALCIUM SERPL-MCNC: 9 MG/DL (ref 8.6–10.4)
CHLORIDE SERPL-SCNC: 105 MMOL/L (ref 98–107)
CO2 SERPL-SCNC: 28 MMOL/L (ref 20–31)
CREAT SERPL-MCNC: 1 MG/DL (ref 0.7–1.2)
EOSINOPHIL # BLD: 0.1 K/UL (ref 0–0.4)
EOSINOPHILS RELATIVE PERCENT: 2 % (ref 1–4)
ERYTHROCYTE [DISTWIDTH] IN BLOOD BY AUTOMATED COUNT: 13.8 % (ref 12.5–15.4)
GFR, ESTIMATED: 83 ML/MIN/1.73M2
GLUCOSE SERPL-MCNC: 111 MG/DL (ref 70–99)
HCT VFR BLD AUTO: 41.2 % (ref 41–53)
HGB BLD-MCNC: 13.6 G/DL (ref 13.5–17.5)
LYMPHOCYTES NFR BLD: 2.6 K/UL (ref 1–4.8)
LYMPHOCYTES RELATIVE PERCENT: 39 % (ref 24–44)
MCH RBC QN AUTO: 30.6 PG (ref 26–34)
MCHC RBC AUTO-ENTMCNC: 33 G/DL (ref 31–37)
MCV RBC AUTO: 92.5 FL (ref 80–100)
MONOCYTES NFR BLD: 0.7 K/UL (ref 0.1–1.2)
MONOCYTES NFR BLD: 10 % (ref 2–11)
NEUTROPHILS NFR BLD: 48 % (ref 36–66)
NEUTS SEG NFR BLD: 3.3 K/UL (ref 1.8–7.7)
PLATELET # BLD AUTO: 227 K/UL (ref 140–450)
PMV BLD AUTO: 7.3 FL (ref 6–12)
POTASSIUM SERPL-SCNC: 4 MMOL/L (ref 3.7–5.3)
PROT SERPL-MCNC: 6.9 G/DL (ref 6.4–8.3)
RBC # BLD AUTO: 4.46 M/UL (ref 4.5–5.9)
SODIUM SERPL-SCNC: 140 MMOL/L (ref 135–144)
WBC OTHER # BLD: 6.8 K/UL (ref 3.5–11)

## 2025-04-18 PROCEDURE — 1125F AMNT PAIN NOTED PAIN PRSNT: CPT | Performed by: INTERNAL MEDICINE

## 2025-04-18 PROCEDURE — 96417 CHEMO IV INFUS EACH ADDL SEQ: CPT

## 2025-04-18 PROCEDURE — 6360000002 HC RX W HCPCS: Performed by: INTERNAL MEDICINE

## 2025-04-18 PROCEDURE — 3017F COLORECTAL CA SCREEN DOC REV: CPT | Performed by: INTERNAL MEDICINE

## 2025-04-18 PROCEDURE — 99214 OFFICE O/P EST MOD 30 MIN: CPT | Performed by: INTERNAL MEDICINE

## 2025-04-18 PROCEDURE — 85025 COMPLETE CBC W/AUTO DIFF WBC: CPT

## 2025-04-18 PROCEDURE — 96413 CHEMO IV INFUSION 1 HR: CPT

## 2025-04-18 PROCEDURE — 1123F ACP DISCUSS/DSCN MKR DOCD: CPT | Performed by: INTERNAL MEDICINE

## 2025-04-18 PROCEDURE — 96367 TX/PROPH/DG ADDL SEQ IV INF: CPT

## 2025-04-18 PROCEDURE — 80053 COMPREHEN METABOLIC PANEL: CPT

## 2025-04-18 PROCEDURE — 2580000003 HC RX 258: Performed by: INTERNAL MEDICINE

## 2025-04-18 PROCEDURE — 1159F MED LIST DOCD IN RCRD: CPT | Performed by: INTERNAL MEDICINE

## 2025-04-18 PROCEDURE — G8417 CALC BMI ABV UP PARAM F/U: HCPCS | Performed by: INTERNAL MEDICINE

## 2025-04-18 PROCEDURE — 96415 CHEMO IV INFUSION ADDL HR: CPT

## 2025-04-18 PROCEDURE — 77386 HC NTSTY MODUL RAD TX DLVR CPLX: CPT | Performed by: RADIOLOGY

## 2025-04-18 PROCEDURE — G8427 DOCREV CUR MEDS BY ELIG CLIN: HCPCS | Performed by: INTERNAL MEDICINE

## 2025-04-18 PROCEDURE — 1036F TOBACCO NON-USER: CPT | Performed by: INTERNAL MEDICINE

## 2025-04-18 PROCEDURE — 2500000003 HC RX 250 WO HCPCS: Performed by: INTERNAL MEDICINE

## 2025-04-18 PROCEDURE — 96375 TX/PRO/DX INJ NEW DRUG ADDON: CPT

## 2025-04-18 RX ORDER — SODIUM CHLORIDE 0.9 % (FLUSH) 0.9 %
5-40 SYRINGE (ML) INJECTION PRN
Status: DISCONTINUED | OUTPATIENT
Start: 2025-04-18 | End: 2025-04-19 | Stop reason: HOSPADM

## 2025-04-18 RX ORDER — ONDANSETRON 2 MG/ML
8 INJECTION INTRAMUSCULAR; INTRAVENOUS
OUTPATIENT
Start: 2025-04-25

## 2025-04-18 RX ORDER — SODIUM CHLORIDE 9 MG/ML
5-250 INJECTION, SOLUTION INTRAVENOUS PRN
OUTPATIENT
Start: 2025-04-25

## 2025-04-18 RX ORDER — HEPARIN 100 UNIT/ML
500 SYRINGE INTRAVENOUS PRN
Status: DISCONTINUED | OUTPATIENT
Start: 2025-04-18 | End: 2025-04-19 | Stop reason: HOSPADM

## 2025-04-18 RX ORDER — SODIUM CHLORIDE 0.9 % (FLUSH) 0.9 %
5-40 SYRINGE (ML) INJECTION PRN
OUTPATIENT
Start: 2025-04-25

## 2025-04-18 RX ORDER — DIPHENHYDRAMINE HYDROCHLORIDE 50 MG/ML
50 INJECTION, SOLUTION INTRAMUSCULAR; INTRAVENOUS
OUTPATIENT
Start: 2025-04-25

## 2025-04-18 RX ORDER — ALBUTEROL SULFATE 90 UG/1
4 INHALANT RESPIRATORY (INHALATION) PRN
OUTPATIENT
Start: 2025-04-25

## 2025-04-18 RX ORDER — HYDROCORTISONE SODIUM SUCCINATE 100 MG/2ML
100 INJECTION INTRAMUSCULAR; INTRAVENOUS
OUTPATIENT
Start: 2025-04-25

## 2025-04-18 RX ORDER — SODIUM CHLORIDE 9 MG/ML
5-250 INJECTION, SOLUTION INTRAVENOUS PRN
Status: DISCONTINUED | OUTPATIENT
Start: 2025-04-18 | End: 2025-04-19 | Stop reason: HOSPADM

## 2025-04-18 RX ORDER — HEPARIN SODIUM (PORCINE) LOCK FLUSH IV SOLN 100 UNIT/ML 100 UNIT/ML
500 SOLUTION INTRAVENOUS PRN
OUTPATIENT
Start: 2025-04-25

## 2025-04-18 RX ORDER — SODIUM CHLORIDE 9 MG/ML
INJECTION, SOLUTION INTRAVENOUS CONTINUOUS
OUTPATIENT
Start: 2025-04-25

## 2025-04-18 RX ORDER — FAMOTIDINE 10 MG/ML
20 INJECTION, SOLUTION INTRAVENOUS
OUTPATIENT
Start: 2025-04-25

## 2025-04-18 RX ORDER — PALONOSETRON 0.05 MG/ML
0.25 INJECTION, SOLUTION INTRAVENOUS ONCE
OUTPATIENT
Start: 2025-04-25 | End: 2025-04-25

## 2025-04-18 RX ORDER — MEPERIDINE HYDROCHLORIDE 50 MG/ML
12.5 INJECTION INTRAMUSCULAR; INTRAVENOUS; SUBCUTANEOUS PRN
OUTPATIENT
Start: 2025-04-25

## 2025-04-18 RX ORDER — DEXAMETHASONE SODIUM PHOSPHATE 10 MG/ML
10 INJECTION, SOLUTION INTRAMUSCULAR; INTRAVENOUS ONCE
Status: COMPLETED | OUTPATIENT
Start: 2025-04-18 | End: 2025-04-18

## 2025-04-18 RX ORDER — DIPHENHYDRAMINE HYDROCHLORIDE 50 MG/ML
50 INJECTION, SOLUTION INTRAMUSCULAR; INTRAVENOUS ONCE
OUTPATIENT
Start: 2025-04-25 | End: 2025-04-25

## 2025-04-18 RX ORDER — DIPHENHYDRAMINE HYDROCHLORIDE 50 MG/ML
50 INJECTION, SOLUTION INTRAMUSCULAR; INTRAVENOUS ONCE
Status: COMPLETED | OUTPATIENT
Start: 2025-04-18 | End: 2025-04-18

## 2025-04-18 RX ORDER — EPINEPHRINE 1 MG/ML
0.3 INJECTION, SOLUTION, CONCENTRATE INTRAVENOUS PRN
OUTPATIENT
Start: 2025-04-25

## 2025-04-18 RX ORDER — ACETAMINOPHEN 325 MG/1
650 TABLET ORAL
OUTPATIENT
Start: 2025-04-25

## 2025-04-18 RX ORDER — FAMOTIDINE 10 MG/ML
20 INJECTION, SOLUTION INTRAVENOUS ONCE
OUTPATIENT
Start: 2025-04-25 | End: 2025-04-25

## 2025-04-18 RX ORDER — PALONOSETRON 0.05 MG/ML
0.25 INJECTION, SOLUTION INTRAVENOUS ONCE
Status: COMPLETED | OUTPATIENT
Start: 2025-04-18 | End: 2025-04-18

## 2025-04-18 RX ORDER — FAMOTIDINE 10 MG/ML
20 INJECTION, SOLUTION INTRAVENOUS ONCE
Status: COMPLETED | OUTPATIENT
Start: 2025-04-18 | End: 2025-04-18

## 2025-04-18 RX ADMIN — DIPHENHYDRAMINE HYDROCHLORIDE 50 MG: 50 INJECTION INTRAMUSCULAR; INTRAVENOUS at 09:23

## 2025-04-18 RX ADMIN — PACLITAXEL 96 MG: 6 INJECTION, SOLUTION INTRAVENOUS at 10:06

## 2025-04-18 RX ADMIN — DEXAMETHASONE SODIUM PHOSPHATE 10 MG: 10 INJECTION INTRAMUSCULAR; INTRAVENOUS at 09:23

## 2025-04-18 RX ADMIN — PALONOSETRON 0.25 MG: 0.05 INJECTION, SOLUTION INTRAVENOUS at 09:23

## 2025-04-18 RX ADMIN — SODIUM CHLORIDE 200 ML/HR: 0.9 INJECTION, SOLUTION INTRAVENOUS at 09:23

## 2025-04-18 RX ADMIN — SODIUM CHLORIDE, PRESERVATIVE FREE 10 ML: 5 INJECTION INTRAVENOUS at 08:33

## 2025-04-18 RX ADMIN — FAMOTIDINE 20 MG: 10 INJECTION, SOLUTION INTRAVENOUS at 09:23

## 2025-04-18 RX ADMIN — CARBOPLATIN 220 MG: 10 INJECTION INTRAVENOUS at 11:07

## 2025-04-18 RX ADMIN — SODIUM CHLORIDE, PRESERVATIVE FREE 10 ML: 5 INJECTION INTRAVENOUS at 11:43

## 2025-04-18 RX ADMIN — HEPARIN 500 UNITS: 100 SYRINGE at 11:44

## 2025-04-18 NOTE — PROGRESS NOTES
Outpatient Oncology Advance Care Planning  Writer assisted Patient with completing his ACP documents. (Please see ACP note).      04/18/25 1243   Encounter Summary   Encounter Overview/Reason Advance Care Planning   Service Provided For Patient and family together   Referral/Consult From Patient   Support System Significant other;Children   Last Encounter  04/18/25   Complexity of Encounter Moderate   Advance Care Planning   Type Completed AD/ACP document(s)     POLY Mc  [facility]   [phone]

## 2025-04-18 NOTE — PROGRESS NOTES
Pt here for C.1D1 taxol, carbo.  Arrives ambulatory.  Denies any new complaints.  Labs drawn from port, results reviewed.  Pt was seen by , order rec'd to proceed with tx.  Tx complete without incident.  Pt d/c'd in stable condition.  Returns 4/25 for Tx.

## 2025-04-18 NOTE — ACP (ADVANCE CARE PLANNING)
Advance Care Planning   Advance Care Planning Note  Ambulatory Spiritual Care Services    Date:  4/18/2025    Received request from patient.    Consultation conversation participants:   Patient who understands ACP conversation  Healthcare Decision Maker     Goals of ACP Conversation:  Complete ACP documents    Health Care Decision Makers:      Primary Decision Maker: Christine Pettit - Domestic Partner - [phone]    Secondary Decision Maker: Elvira Harman - Child - [phone]    Supplemental (Other) Decision Maker: Bre Escoto - Step Child - [phone]   Summary:  Completed New Documents    Advance Care Planning Documents (Patient Wishes)  Currently on file:   Healthcare Power of /Advance Directive Appointment of Health Care Agent  Living Will/Advance Directive    Assessment:   Patient requested assistance from  Armen with completing his advance care planning documents. Writer facilitated the discussion. Patient's significant other/domestic partner was present during the conversation. Patient affirmed that he draws strength from his sense of humor and attitude. Pt's young son and Domestic partner are sources of support as well. Chaplains hope to continue exploring Patient's sources of influence and support in future visits.     Interventions:  Provided education on documents for clarity and greater understanding  Discussed and provided education on state decision maker hierarchy  Assisted in the completion of documents according to patient's wishes at this time    Care Preferences Communicated:   No    Outcomes:  ACP Discussion: Completed    Patient / Healthcare Decision Maker Instructions:  Review completed ACP document(s) and update, if needed, with changes health or future preferences    Electronically signed by POLY Mc on 4/18/2025 at 12:33 PM.

## 2025-04-19 NOTE — PROGRESS NOTES
_     Chief Complaint   Patient presents with    Follow-up     First tx scheduled for today       DIAGNOSIS:    Stage T2N1M0 right lung adenocarcinoma 2010  New diagnosis of right lower lobe lung squamous cell carcinoma February 2025.  Clinical stage T3 N0 M0    CURRENT THERAPY:    Status post resection followed by adjuvant chemotherapy in 2010..    Further workup and management of newly diagnosed right lung squamous cell carcinoma.    Plan for chemoradiation followed by immunotherapy treatment. Chemoradiation started 4/18/25.     BRIEF CASE HISTORY:        Mr. Vic Nunez is a very pleasant 64 y.o. male with history of multiple co morbidities as listed.  He is referred for further management of recently diagnosed lung cancer.  Patient had history of l right lung adenocarcinoma in 2010.  He was treated with resection followed by 4 cycles of adjuvant chemotherapy treatment.  He was in remission since then.  He was last seen in January 2017.  He was lost for follow-up since then.  Recently patient had complaints of back pain.  He had evaluation with imaging and incidentally found to have right lower lobe lung lesion.  He had evaluation by pulmonary and he had bronchoscopy and biopsy.  Results positive for squamous cell carcinoma.  Clinically patient does not have significant respiratory symptoms.  He has occasional cough and sputum.  No hemoptysis.  No chest pain.  No fever or infections.  No headaches.    Patient quit smoking in 2024.    INTERIM HISTORY:   Evaluated today for further management of recently diagnosed squamous cell carcinoma of the right lung. No symptoms at the present time. No cough. No hemoptysis.        PAST MEDICAL HISTORY: has a past medical history of Arthritis, Cancer (HCC), Cataract, Chronic back pain, COPD (chronic obstructive pulmonary disease) (HCC), Diabetes mellitus (HCC), Floaters, Full dentures,

## 2025-04-21 ENCOUNTER — HOSPITAL ENCOUNTER (OUTPATIENT)
Dept: RADIATION ONCOLOGY | Age: 65
Discharge: HOME | End: 2025-04-21
Payer: MEDICARE

## 2025-04-21 PROCEDURE — 77386 HC NTSTY MODUL RAD TX DLVR CPLX: CPT | Performed by: RADIOLOGY

## 2025-04-22 ENCOUNTER — SOCIAL WORK (OUTPATIENT)
Dept: ONCOLOGY | Age: 65
End: 2025-04-22

## 2025-04-22 ENCOUNTER — HOSPITAL ENCOUNTER (OUTPATIENT)
Dept: RADIATION ONCOLOGY | Age: 65
Discharge: HOME | End: 2025-04-22
Payer: MEDICARE

## 2025-04-22 ENCOUNTER — TELEPHONE (OUTPATIENT)
Dept: ONCOLOGY | Age: 65
End: 2025-04-22

## 2025-04-22 PROCEDURE — 77386 HC NTSTY MODUL RAD TX DLVR CPLX: CPT | Performed by: RADIOLOGY

## 2025-04-22 NOTE — PROGRESS NOTES
set up ride through insurance provider for 4/28-5/2 to [place]. Patient updated.      Transportation details:  [facility] [phone]   1 pm 4/28-5/2   9:45 am 5/2  Confirmation #[phone], #[id number], #[id number], [id number], #[id number]  3 pm return ride 4/28-5/1  Call for return ride 5/2

## 2025-04-22 NOTE — TELEPHONE ENCOUNTER
Name: Vic Nunez .  : 1960  MRN: [id number]    Oncology Navigation Follow-Up Note    Contact Type:  Telephone    Notes: Navigator met with pt. Face to face offering assistance. No barriers to care noted.      Electronically signed by Jessica Moeller RN on 2025 at 2:49 PM

## 2025-04-23 ENCOUNTER — APPOINTMENT (OUTPATIENT)
Dept: RADIATION ONCOLOGY | Age: 65
End: 2025-04-23
Payer: MEDICARE

## 2025-04-23 PROCEDURE — 77336 RADIATION PHYSICS CONSULT: CPT | Performed by: RADIOLOGY

## 2025-04-24 ENCOUNTER — HOSPITAL ENCOUNTER (OUTPATIENT)
Dept: RADIATION ONCOLOGY | Age: 65
Discharge: HOME | End: 2025-04-24
Payer: MEDICARE

## 2025-04-24 VITALS
SYSTOLIC BLOOD PRESSURE: 151 MMHG | WEIGHT: 199.6 LBS | RESPIRATION RATE: 16 BRPM | DIASTOLIC BLOOD PRESSURE: 93 MMHG | TEMPERATURE: 97.1 F | HEART RATE: 76 BPM | BODY MASS INDEX: 27.07 KG/M2 | OXYGEN SATURATION: 97 %

## 2025-04-24 PROCEDURE — 77386 HC NTSTY MODUL RAD TX DLVR CPLX: CPT | Performed by: RADIOLOGY

## 2025-04-24 NOTE — PROGRESS NOTES
Vic Nunez Sr.  4/24/2025  Wt Readings from Last 3 Encounters:   04/24/25 90.5 kg (199 lb 9.6 oz)   04/18/25 92.1 kg (203 lb)   04/15/25 91 kg (200 lb 9.6 oz)     Body mass index is 27.07 kg/m².        Treatment Area:  Right Lung Cancer w/ Weekly Carbo/Taxol    Patient was seen today for weekly visit.      Comfort Alteration  Fatigue: None    Ventilation Alterations  Cough: Occasional  Hemoptysis: No  Mucus Color: N/A  Dyspnea: No      Nutritional Alteration  Anorexia: No  Nausea: Occasional-antiemetics helpful  Vomiting: No     Mucous Membrane Alteration  Voice Changes/ Stridor/Larynx: no  Pharynx & Esophagus: wnl    Elimination Alterations  Constipation: no  Diarrhea:  no    Skin Alteration   Sensation: wnl    Radiation Dermatitis:  Intact []     Erythema  []     Discoloration  []     Rash []     Dry desquamation  []     Moist desquamation []       Emotional  Coping: effective      Injury, potential bleeding or infection:     Lab Results   Component Value Date    WBC 6.8 04/18/2025    HGB 13.6 04/18/2025    HCT 41.2 04/18/2025     04/18/2025         BP (!) 151/93   Pulse 76   Temp 97.1 °F (36.2 °C) (Temporal)   Resp 16   Wt 90.5 kg (199 lb 9.6 oz)   SpO2 97%   BMI 27.07 kg/m²                   Assessment/Plan: Patient was seen today for weekly visit.  He denies side effects radiation treatments.  States occasional nausea relieved by antiemetic.  Reminded to optimize nutrition.  Plan of care ongoing.      Jane Barrientos RN

## 2025-04-24 NOTE — PROGRESS NOTES
[facility]       Radiation Oncology          [address] [id number]        O: [phone]        F: [phone]       [employer]             RADIATION ONCOLOGY WEEKLY PROGRESS NOTE  Patient ID:   Vic Nunez Sr.  : 1960   MRN: [id number]    Location:  [facility] Radiation Oncology,   [address] [place] [address], [place] [address]   [phone]    DIAGNOSIS:  Squamous cell carcinoma of the right lower lobe T3 N0 M0      TREATMENT DETAILS:  Treatment Site: RLL  Actual Dose: 1000cGy  Total Planned Dose: 6000cGy  Treatment Technique: IMRT  Fraction Technique: Daily  Therapy imaging monitoring: CBCT daily  Concurrent Chemotherapy: Weekly CarboTaxol    SUBJECTIVE:   Patient seen for their weekly on treatment evaluation today.  Doing well, no skin changes, no dysphagia, no changes in breathing    OBJECTIVE:     ECO Asymptomatic    VITAL SIGNS: BP (!) 151/93   Pulse 76   Temp 97.1 °F (36.2 °C) (Temporal)   Resp 16   Wt 90.5 kg (199 lb 9.6 oz)   SpO2 97%   BMI 27.07 kg/m²   Wt Readings from Last 5 Encounters:   25 90.5 kg (199 lb 9.6 oz)   25 92.1 kg (203 lb)   04/15/25 91 kg (200 lb 9.6 oz)   25 90.8 kg (200 lb 2.8 oz)   25 88.9 kg (196 lb)     GENERAL:  General appearance is that of a well-nourished, well-developed in no apparent distress.  HEART:  Normal rate and regular rhythm  LUNGS:  Pulmonary effort normal.  ABDOMEN:  Soft, nontender, non distended  EXTREMITIES:  No edema.  No calf tenderness.  MSK:  No spinal tenderness. Normal ROM.  NEUROLOGICAL: Alert and oriented. Strength and sensation intact bilaterally. No focal deficits.   PSYCH: Mood normal, behavior normal.      LABS:  WBC   Date Value Ref Range Status   2025 6.8 3.5 - 11.0 k/uL Final   2025 6.4 3.5 - 11.0 k/uL Final   10/02/2023 6.6 3.5 - 11.3 k/uL Final     Neutrophils Absolute   Date Value Ref Range Status   2025 3.30 1.8 -

## 2025-04-25 ENCOUNTER — HOSPITAL ENCOUNTER (OUTPATIENT)
Dept: RADIATION ONCOLOGY | Age: 65
Discharge: HOME | End: 2025-04-25
Payer: MEDICARE

## 2025-04-25 ENCOUNTER — HOSPITAL ENCOUNTER (OUTPATIENT)
Dept: INFUSION THERAPY | Age: 65
Discharge: HOME | End: 2025-04-25
Payer: MEDICARE

## 2025-04-25 VITALS
SYSTOLIC BLOOD PRESSURE: 131 MMHG | RESPIRATION RATE: 16 BRPM | WEIGHT: 205.4 LBS | TEMPERATURE: 97.1 F | BODY MASS INDEX: 27.86 KG/M2 | HEART RATE: 64 BPM | DIASTOLIC BLOOD PRESSURE: 79 MMHG

## 2025-04-25 DIAGNOSIS — C78.01 SQUAMOUS CELL CARCINOMA METASTATIC TO BRONCHUS OF RIGHT LOWER LOBE (HCC): Primary | ICD-10-CM

## 2025-04-25 DIAGNOSIS — C44.92 SQUAMOUS CELL CARCINOMA METASTATIC TO BRONCHUS OF RIGHT LOWER LOBE (HCC): Primary | ICD-10-CM

## 2025-04-25 LAB
ALBUMIN SERPL-MCNC: 4.1 G/DL (ref 3.5–5.2)
ALBUMIN/GLOB SERPL: 1.6 {RATIO} (ref 1–2.5)
ALP SERPL-CCNC: 77 U/L (ref 40–129)
ALT SERPL-CCNC: 19 U/L (ref 5–41)
ANION GAP SERPL CALCULATED.3IONS-SCNC: 9 MMOL/L (ref 9–17)
AST SERPL-CCNC: 18 U/L
BASOPHILS # BLD: 0 K/UL (ref 0–0.2)
BASOPHILS NFR BLD: 1 % (ref 0–2)
BILIRUB SERPL-MCNC: 0.4 MG/DL (ref 0.3–1.2)
BUN SERPL-MCNC: 20 MG/DL (ref 8–23)
CALCIUM SERPL-MCNC: 9.4 MG/DL (ref 8.6–10.4)
CHLORIDE SERPL-SCNC: 104 MMOL/L (ref 98–107)
CO2 SERPL-SCNC: 27 MMOL/L (ref 20–31)
CREAT SERPL-MCNC: 1.1 MG/DL (ref 0.7–1.2)
EOSINOPHIL # BLD: 0.1 K/UL (ref 0–0.4)
EOSINOPHILS RELATIVE PERCENT: 2 % (ref 1–4)
ERYTHROCYTE [DISTWIDTH] IN BLOOD BY AUTOMATED COUNT: 13.4 % (ref 12.5–15.4)
GFR, ESTIMATED: 74 ML/MIN/1.73M2
GLUCOSE SERPL-MCNC: 111 MG/DL (ref 70–99)
HCT VFR BLD AUTO: 40 % (ref 41–53)
HGB BLD-MCNC: 13.3 G/DL (ref 13.5–17.5)
LYMPHOCYTES NFR BLD: 1.4 K/UL (ref 1–4.8)
LYMPHOCYTES RELATIVE PERCENT: 32 % (ref 24–44)
MCH RBC QN AUTO: 30.5 PG (ref 26–34)
MCHC RBC AUTO-ENTMCNC: 33.2 G/DL (ref 31–37)
MCV RBC AUTO: 92 FL (ref 80–100)
MONOCYTES NFR BLD: 0.4 K/UL (ref 0.1–1.2)
MONOCYTES NFR BLD: 9 % (ref 2–11)
NEUTROPHILS NFR BLD: 56 % (ref 36–66)
NEUTS SEG NFR BLD: 2.4 K/UL (ref 1.8–7.7)
PLATELET # BLD AUTO: 243 K/UL (ref 140–450)
PMV BLD AUTO: 7.2 FL (ref 6–12)
POTASSIUM SERPL-SCNC: 4.3 MMOL/L (ref 3.7–5.3)
PROT SERPL-MCNC: 6.7 G/DL (ref 6.4–8.3)
RBC # BLD AUTO: 4.35 M/UL (ref 4.5–5.9)
SODIUM SERPL-SCNC: 140 MMOL/L (ref 135–144)
WBC OTHER # BLD: 4.3 K/UL (ref 3.5–11)

## 2025-04-25 PROCEDURE — 2580000003 HC RX 258: Performed by: INTERNAL MEDICINE

## 2025-04-25 PROCEDURE — 77386 HC NTSTY MODUL RAD TX DLVR CPLX: CPT | Performed by: RADIOLOGY

## 2025-04-25 PROCEDURE — 96417 CHEMO IV INFUS EACH ADDL SEQ: CPT

## 2025-04-25 PROCEDURE — 96413 CHEMO IV INFUSION 1 HR: CPT

## 2025-04-25 PROCEDURE — 80053 COMPREHEN METABOLIC PANEL: CPT

## 2025-04-25 PROCEDURE — 2500000003 HC RX 250 WO HCPCS: Performed by: INTERNAL MEDICINE

## 2025-04-25 PROCEDURE — 96375 TX/PRO/DX INJ NEW DRUG ADDON: CPT

## 2025-04-25 PROCEDURE — 85025 COMPLETE CBC W/AUTO DIFF WBC: CPT

## 2025-04-25 PROCEDURE — 6360000002 HC RX W HCPCS: Performed by: INTERNAL MEDICINE

## 2025-04-25 RX ORDER — DIPHENHYDRAMINE HYDROCHLORIDE 50 MG/ML
50 INJECTION, SOLUTION INTRAMUSCULAR; INTRAVENOUS ONCE
Status: COMPLETED | OUTPATIENT
Start: 2025-04-25 | End: 2025-04-25

## 2025-04-25 RX ORDER — DEXAMETHASONE SODIUM PHOSPHATE 10 MG/ML
10 INJECTION, SOLUTION INTRAMUSCULAR; INTRAVENOUS ONCE
Status: COMPLETED | OUTPATIENT
Start: 2025-04-25 | End: 2025-04-25

## 2025-04-25 RX ORDER — PALONOSETRON 0.05 MG/ML
0.25 INJECTION, SOLUTION INTRAVENOUS ONCE
Status: COMPLETED | OUTPATIENT
Start: 2025-04-25 | End: 2025-04-25

## 2025-04-25 RX ORDER — SODIUM CHLORIDE 9 MG/ML
5-250 INJECTION, SOLUTION INTRAVENOUS PRN
Status: DISCONTINUED | OUTPATIENT
Start: 2025-04-25 | End: 2025-04-26 | Stop reason: HOSPADM

## 2025-04-25 RX ORDER — HEPARIN 100 UNIT/ML
500 SYRINGE INTRAVENOUS PRN
Status: DISCONTINUED | OUTPATIENT
Start: 2025-04-25 | End: 2025-04-26 | Stop reason: HOSPADM

## 2025-04-25 RX ORDER — SODIUM CHLORIDE 0.9 % (FLUSH) 0.9 %
5-40 SYRINGE (ML) INJECTION PRN
Status: DISCONTINUED | OUTPATIENT
Start: 2025-04-25 | End: 2025-04-26 | Stop reason: HOSPADM

## 2025-04-25 RX ORDER — FAMOTIDINE 10 MG/ML
20 INJECTION, SOLUTION INTRAVENOUS ONCE
Status: COMPLETED | OUTPATIENT
Start: 2025-04-25 | End: 2025-04-25

## 2025-04-25 RX ADMIN — FAMOTIDINE 20 MG: 10 INJECTION, SOLUTION INTRAVENOUS at 11:22

## 2025-04-25 RX ADMIN — PALONOSETRON 0.25 MG: 0.05 INJECTION, SOLUTION INTRAVENOUS at 11:22

## 2025-04-25 RX ADMIN — DEXAMETHASONE SODIUM PHOSPHATE 10 MG: 10 INJECTION INTRAMUSCULAR; INTRAVENOUS at 11:22

## 2025-04-25 RX ADMIN — SODIUM CHLORIDE 200 ML/HR: 0.9 INJECTION, SOLUTION INTRAVENOUS at 11:21

## 2025-04-25 RX ADMIN — CARBOPLATIN 210 MG: 10 INJECTION INTRAVENOUS at 13:01

## 2025-04-25 RX ADMIN — PACLITAXEL 96 MG: 6 INJECTION, SOLUTION, CONCENTRATE INTRAVENOUS at 11:59

## 2025-04-25 RX ADMIN — HEPARIN 500 UNITS: 100 SYRINGE at 13:39

## 2025-04-25 RX ADMIN — DIPHENHYDRAMINE HYDROCHLORIDE 50 MG: 50 INJECTION INTRAMUSCULAR; INTRAVENOUS at 11:22

## 2025-04-25 RX ADMIN — SODIUM CHLORIDE, PRESERVATIVE FREE 10 ML: 5 INJECTION INTRAVENOUS at 13:39

## 2025-04-25 NOTE — PROGRESS NOTES
[facility] Oncology/Hematology Progress Note    Situation: Writer encountered Patient, significant other and Vic Odom in the treatment cubicle of the infusion clinic.    Assessment: Pt was happy to see Writer again; very welcoming.   Pt shared that he has been feeling well; no significant side effects. Pt talked about a concern for the state of world.  Pt stressed the importance of family and being there for his son.  Patient identified his significant other, Christine as his main source of support.      Intervention: Writer introduced herself and her services. Writer inquired about Pt's coping and needs. Writer explored Pt's sources of support and strength. Writer offered supportive presence and active listening. Writer affirmed Pt's strengths. Writer offered words of support and encouragement.     Outcome:  Pt and Family thanked writer for the visit.    Plan: [facility] Services are available for Patient (and Family) by phone and/or in person.

## 2025-04-25 NOTE — PROGRESS NOTES
Patient arrives ambulatory for Taxol / Carbo C2D1  Patient denies complaints or concerns  Labs drawn via med port and reviewed, within normal limits for tx  Patient tolerated tx without incident and discharged in stable condition  Next appointment 5/2 for MD visit and C3D1

## 2025-04-28 ENCOUNTER — HOSPITAL ENCOUNTER (OUTPATIENT)
Dept: RADIATION ONCOLOGY | Age: 65
Discharge: HOME | End: 2025-04-28
Payer: MEDICARE

## 2025-04-28 PROCEDURE — 77386 HC NTSTY MODUL RAD TX DLVR CPLX: CPT | Performed by: RADIOLOGY

## 2025-04-29 ENCOUNTER — SOCIAL WORK (OUTPATIENT)
Dept: ONCOLOGY | Age: 65
End: 2025-04-29

## 2025-04-29 ENCOUNTER — HOSPITAL ENCOUNTER (OUTPATIENT)
Dept: RADIATION ONCOLOGY | Age: 65
Discharge: HOME | End: 2025-04-29
Payer: MEDICARE

## 2025-04-29 ENCOUNTER — CLINICAL DOCUMENTATION (OUTPATIENT)
Dept: ONCOLOGY | Age: 65
End: 2025-04-29

## 2025-04-29 PROCEDURE — 77386 HC NTSTY MODUL RAD TX DLVR CPLX: CPT | Performed by: RADIOLOGY

## 2025-04-29 NOTE — PROGRESS NOTES
[facility] Nutrition Screen:    PG-SGA screening form reviewed. Score = 3. RD referral/nutrition evaluation indicated for scores > 4.   Please consult oncology dietitian with any future nutrition concerns/needs.

## 2025-04-29 NOTE — PROGRESS NOTES
set up ride through insurance provider for 5/5-5/9 to [place]. Patient updated.      Transportation details:  [place] [facility] [phone]   1 pm 5/5-5/8,  10 am 5/9  Confirmation #[id number], #[id number], #[id number], [id number], #[id number]  3 pm return ride 5/5-5/8  Call for return ride 5/9

## 2025-04-30 ENCOUNTER — HOSPITAL ENCOUNTER (OUTPATIENT)
Dept: RADIATION ONCOLOGY | Age: 65
Discharge: HOME | End: 2025-04-30
Payer: MEDICARE

## 2025-04-30 VITALS
HEART RATE: 64 BPM | TEMPERATURE: 97 F | BODY MASS INDEX: 27.34 KG/M2 | SYSTOLIC BLOOD PRESSURE: 147 MMHG | WEIGHT: 201.6 LBS | RESPIRATION RATE: 16 BRPM | OXYGEN SATURATION: 98 % | DIASTOLIC BLOOD PRESSURE: 82 MMHG

## 2025-04-30 PROCEDURE — 77336 RADIATION PHYSICS CONSULT: CPT | Performed by: RADIOLOGY

## 2025-04-30 PROCEDURE — 77386 HC NTSTY MODUL RAD TX DLVR CPLX: CPT | Performed by: RADIOLOGY

## 2025-04-30 ASSESSMENT — PAIN DESCRIPTION - LOCATION: LOCATION: BACK

## 2025-04-30 ASSESSMENT — PAIN DESCRIPTION - ORIENTATION: ORIENTATION: LOWER

## 2025-04-30 NOTE — PROGRESS NOTES
[facility]       Radiation Oncology          [address] [place] [address]        O: [phone]        F: [phone]       [facility]             RADIATION ONCOLOGY WEEKLY PROGRESS NOTE  Patient ID:   Vci Nunez Sr.  : 1960   MRN: [id number]    Location:  [facility] Radiation Oncology,   [address] [place] [address]., [place] [address]   [phone]    DIAGNOSIS:  Squamous cell carcinoma of the right lower lobe T3 N0 M0      TREATMENT DETAILS:  Treatment Site: RLL  Actual Dose: 1800cGy  Total Planned Dose: 6000cGy  Treatment Technique: IMRT  Fraction Technique: Daily  Therapy imaging monitoring: CBCT daily  Concurrent Chemotherapy: Weekly CarboTaxol    SUBJECTIVE:   Patient seen for their weekly on treatment evaluation today.  Doing well, no skin changes, no dysphagia, no changes in breathing. He has chronic back pain. He had nausea but his medications help and he has a good appetite.    OBJECTIVE:     ECO Asymptomatic    VITAL SIGNS: BP (!) 147/82   Pulse 64   Temp 97 °F (36.1 °C) (Temporal)   Resp 16   Wt 91.4 kg (201 lb 9.6 oz)   SpO2 98%   BMI 27.34 kg/m²   Wt Readings from Last 5 Encounters:   25 91.4 kg (201 lb 9.6 oz)   25 93.2 kg (205 lb 6.4 oz)   25 90.5 kg (199 lb 9.6 oz)   25 92.1 kg (203 lb)   04/15/25 91 kg (200 lb 9.6 oz)     GENERAL:  General appearance is that of a well-nourished, well-developed in no apparent distress.  HEART:  Normal rate and regular rhythm  LUNGS:  Pulmonary effort normal.  ABDOMEN:  Soft, nontender, non distended  EXTREMITIES:  No edema.  No calf tenderness.  MSK:  No spinal tenderness. Normal ROM.  NEUROLOGICAL: Alert and oriented. Strength and sensation intact bilaterally. No focal deficits.   PSYCH: Mood normal, behavior normal.      LABS:  WBC   Date Value Ref Range Status   2025 4.3 3.5 - 11.0 k/uL Final   2025 6.8 3.5 - 11.0 k/uL Final   2025 6.4  Detail Level: Detailed Add 23146 Cpt? (Important Note: In 2017 The Use Of 10434 Is Being Tracked By Cms To Determine Future Global Period Reimbursement For Global Periods): yes Add 1585x Cpt? (Do Not Bill If You Billed For The Procedure Placing The Sutures. This Is An Add-On Code That Must Be Billed With An E/M Visit Code): No

## 2025-04-30 NOTE — PROGRESS NOTES
Homatilda Nunez Sr.  4/30/2025  Wt Readings from Last 3 Encounters:   04/30/25 91.4 kg (201 lb 9.6 oz)   04/25/25 93.2 kg (205 lb 6.4 oz)   04/24/25 90.5 kg (199 lb 9.6 oz)     Body mass index is 27.34 kg/m².        Treatment Area:  Right Lung Cancer w/ Weekly Carbo/Taxol    Patient was seen today for weekly visit.      Comfort Alteration  Fatigue: None    Ventilation Alterations  Cough: Occasional  Hemoptysis: No  Mucus Color: N/A  Dyspnea: No      Nutritional Alteration  Anorexia: No  Nausea: Occasional-antiemetics helpful  Vomiting: No     Mucous Membrane Alteration  Voice Changes/ Stridor/Larynx: no  Pharynx & Esophagus: wnl    Elimination Alterations  Constipation: no  Diarrhea:  no    Skin Alteration   Sensation: wnl    Radiation Dermatitis:  Intact [x]     Erythema  []     Discoloration  []     Rash []     Dry desquamation  []     Moist desquamation []       Emotional  Coping: effective      Injury, potential bleeding or infection:     Lab Results   Component Value Date    WBC 4.3 04/25/2025    HGB 13.3 (L) 04/25/2025    HCT 40.0 (L) 04/25/2025     04/25/2025         BP (!) 147/82   Pulse 64   Temp 97 °F (36.1 °C) (Temporal)   Resp 16   Wt 91.4 kg (201 lb 9.6 oz)   SpO2 98%   BMI 27.34 kg/m²      Pain Assessment: 0-10     Patient's Stated Pain Goal: 10      Assessment/Plan: Patient was seen today for weekly visit.  Denies new respiratory symptoms.  Reports nausea for which antiemetics are helpful.  He continues to smoke marijuana-states this helps with his back pain.  Encouraged to quit this.  Plan of care ongoing.      Jane Barrientos RN

## 2025-05-01 ENCOUNTER — HOSPITAL ENCOUNTER (OUTPATIENT)
Dept: RADIATION ONCOLOGY | Age: 65
Discharge: HOME | End: 2025-05-01
Payer: MEDICARE

## 2025-05-01 PROCEDURE — 77386 HC NTSTY MODUL RAD TX DLVR CPLX: CPT | Performed by: RADIOLOGY

## 2025-05-02 ENCOUNTER — HOSPITAL ENCOUNTER (OUTPATIENT)
Dept: INFUSION THERAPY | Age: 65
Discharge: HOME | End: 2025-05-02
Payer: MEDICARE

## 2025-05-02 ENCOUNTER — OFFICE VISIT (OUTPATIENT)
Age: 65
End: 2025-05-02
Payer: MEDICARE

## 2025-05-02 ENCOUNTER — HOSPITAL ENCOUNTER (OUTPATIENT)
Dept: RADIATION ONCOLOGY | Age: 65
Discharge: HOME | End: 2025-05-02
Payer: MEDICARE

## 2025-05-02 VITALS
SYSTOLIC BLOOD PRESSURE: 124 MMHG | HEART RATE: 67 BPM | RESPIRATION RATE: 18 BRPM | HEIGHT: 72 IN | DIASTOLIC BLOOD PRESSURE: 80 MMHG | TEMPERATURE: 97.1 F | WEIGHT: 203.13 LBS | OXYGEN SATURATION: 97 % | BODY MASS INDEX: 27.51 KG/M2

## 2025-05-02 DIAGNOSIS — C78.01 SQUAMOUS CELL CARCINOMA METASTATIC TO BRONCHUS OF RIGHT LOWER LOBE (HCC): Primary | ICD-10-CM

## 2025-05-02 DIAGNOSIS — C78.01 SQUAMOUS CELL CARCINOMA METASTATIC TO BRONCHUS OF RIGHT LOWER LOBE (HCC): ICD-10-CM

## 2025-05-02 DIAGNOSIS — C34.91 MALIGNANT NEOPLASM OF RIGHT LUNG, UNSPECIFIED PART OF LUNG (HCC): Primary | ICD-10-CM

## 2025-05-02 DIAGNOSIS — C44.92 SQUAMOUS CELL CARCINOMA METASTATIC TO BRONCHUS OF RIGHT LOWER LOBE (HCC): ICD-10-CM

## 2025-05-02 DIAGNOSIS — C44.92 SQUAMOUS CELL CARCINOMA METASTATIC TO BRONCHUS OF RIGHT LOWER LOBE (HCC): Primary | ICD-10-CM

## 2025-05-02 LAB
ALBUMIN SERPL-MCNC: 4.1 G/DL (ref 3.5–5.2)
ALBUMIN/GLOB SERPL: 1.5 {RATIO} (ref 1–2.5)
ALP SERPL-CCNC: 72 U/L (ref 40–129)
ALT SERPL-CCNC: 18 U/L (ref 5–41)
ANION GAP SERPL CALCULATED.3IONS-SCNC: 9 MMOL/L (ref 9–17)
AST SERPL-CCNC: 20 U/L
BASOPHILS # BLD: 0 K/UL (ref 0–0.2)
BASOPHILS NFR BLD: 1 % (ref 0–2)
BILIRUB SERPL-MCNC: 0.7 MG/DL (ref 0.3–1.2)
BUN SERPL-MCNC: 13 MG/DL (ref 8–23)
CALCIUM SERPL-MCNC: 8.8 MG/DL (ref 8.6–10.4)
CHLORIDE SERPL-SCNC: 102 MMOL/L (ref 98–107)
CO2 SERPL-SCNC: 27 MMOL/L (ref 20–31)
CREAT SERPL-MCNC: 1 MG/DL (ref 0.7–1.2)
EOSINOPHIL # BLD: 0 K/UL (ref 0–0.4)
EOSINOPHILS RELATIVE PERCENT: 1 % (ref 1–4)
ERYTHROCYTE [DISTWIDTH] IN BLOOD BY AUTOMATED COUNT: 13.9 % (ref 12.5–15.4)
GFR, ESTIMATED: 83 ML/MIN/1.73M2
GLUCOSE SERPL-MCNC: 97 MG/DL (ref 70–99)
HCT VFR BLD AUTO: 39.1 % (ref 41–53)
HGB BLD-MCNC: 12.9 G/DL (ref 13.5–17.5)
LYMPHOCYTES NFR BLD: 1.2 K/UL (ref 1–4.8)
LYMPHOCYTES RELATIVE PERCENT: 29 % (ref 24–44)
MCH RBC QN AUTO: 30.5 PG (ref 26–34)
MCHC RBC AUTO-ENTMCNC: 33.1 G/DL (ref 31–37)
MCV RBC AUTO: 92 FL (ref 80–100)
MONOCYTES NFR BLD: 0.4 K/UL (ref 0.1–1.2)
MONOCYTES NFR BLD: 10 % (ref 2–11)
NEUTROPHILS NFR BLD: 59 % (ref 36–66)
NEUTS SEG NFR BLD: 2.4 K/UL (ref 1.8–7.7)
PLATELET # BLD AUTO: 222 K/UL (ref 140–450)
PMV BLD AUTO: 7.3 FL (ref 6–12)
POTASSIUM SERPL-SCNC: 4.2 MMOL/L (ref 3.7–5.3)
PROT SERPL-MCNC: 6.9 G/DL (ref 6.4–8.3)
RBC # BLD AUTO: 4.25 M/UL (ref 4.5–5.9)
SODIUM SERPL-SCNC: 138 MMOL/L (ref 135–144)
WBC OTHER # BLD: 4.2 K/UL (ref 3.5–11)

## 2025-05-02 PROCEDURE — 96375 TX/PRO/DX INJ NEW DRUG ADDON: CPT

## 2025-05-02 PROCEDURE — 77386 HC NTSTY MODUL RAD TX DLVR CPLX: CPT | Performed by: RADIOLOGY

## 2025-05-02 PROCEDURE — 99214 OFFICE O/P EST MOD 30 MIN: CPT | Performed by: INTERNAL MEDICINE

## 2025-05-02 PROCEDURE — 96417 CHEMO IV INFUS EACH ADDL SEQ: CPT

## 2025-05-02 PROCEDURE — 80053 COMPREHEN METABOLIC PANEL: CPT

## 2025-05-02 PROCEDURE — 2580000003 HC RX 258: Performed by: INTERNAL MEDICINE

## 2025-05-02 PROCEDURE — G8417 CALC BMI ABV UP PARAM F/U: HCPCS | Performed by: INTERNAL MEDICINE

## 2025-05-02 PROCEDURE — 85025 COMPLETE CBC W/AUTO DIFF WBC: CPT

## 2025-05-02 PROCEDURE — 1036F TOBACCO NON-USER: CPT | Performed by: INTERNAL MEDICINE

## 2025-05-02 PROCEDURE — G8427 DOCREV CUR MEDS BY ELIG CLIN: HCPCS | Performed by: INTERNAL MEDICINE

## 2025-05-02 PROCEDURE — 1159F MED LIST DOCD IN RCRD: CPT | Performed by: INTERNAL MEDICINE

## 2025-05-02 PROCEDURE — 2500000003 HC RX 250 WO HCPCS: Performed by: INTERNAL MEDICINE

## 2025-05-02 PROCEDURE — 1125F AMNT PAIN NOTED PAIN PRSNT: CPT | Performed by: INTERNAL MEDICINE

## 2025-05-02 PROCEDURE — 96413 CHEMO IV INFUSION 1 HR: CPT

## 2025-05-02 PROCEDURE — 6360000002 HC RX W HCPCS: Performed by: INTERNAL MEDICINE

## 2025-05-02 PROCEDURE — 1123F ACP DISCUSS/DSCN MKR DOCD: CPT | Performed by: INTERNAL MEDICINE

## 2025-05-02 PROCEDURE — 3017F COLORECTAL CA SCREEN DOC REV: CPT | Performed by: INTERNAL MEDICINE

## 2025-05-02 RX ORDER — SODIUM CHLORIDE 9 MG/ML
25 INJECTION, SOLUTION INTRAVENOUS PRN
OUTPATIENT
Start: 2025-05-02

## 2025-05-02 RX ORDER — SODIUM CHLORIDE 9 MG/ML
5-250 INJECTION, SOLUTION INTRAVENOUS PRN
Status: CANCELLED | OUTPATIENT
Start: 2025-05-02

## 2025-05-02 RX ORDER — DEXAMETHASONE SODIUM PHOSPHATE 10 MG/ML
10 INJECTION, SOLUTION INTRAMUSCULAR; INTRAVENOUS ONCE
Status: COMPLETED | OUTPATIENT
Start: 2025-05-02 | End: 2025-05-02

## 2025-05-02 RX ORDER — MEPERIDINE HYDROCHLORIDE 50 MG/ML
12.5 INJECTION INTRAMUSCULAR; INTRAVENOUS; SUBCUTANEOUS PRN
Status: CANCELLED | OUTPATIENT
Start: 2025-05-02

## 2025-05-02 RX ORDER — HEPARIN 100 UNIT/ML
500 SYRINGE INTRAVENOUS PRN
Status: DISCONTINUED | OUTPATIENT
Start: 2025-05-02 | End: 2025-05-03 | Stop reason: HOSPADM

## 2025-05-02 RX ORDER — SODIUM CHLORIDE 0.9 % (FLUSH) 0.9 %
5-40 SYRINGE (ML) INJECTION PRN
Status: DISCONTINUED | OUTPATIENT
Start: 2025-05-02 | End: 2025-05-03 | Stop reason: HOSPADM

## 2025-05-02 RX ORDER — ALBUTEROL SULFATE 90 UG/1
4 INHALANT RESPIRATORY (INHALATION) PRN
Status: CANCELLED | OUTPATIENT
Start: 2025-05-02

## 2025-05-02 RX ORDER — ACETAMINOPHEN 325 MG/1
650 TABLET ORAL
Status: CANCELLED | OUTPATIENT
Start: 2025-05-02

## 2025-05-02 RX ORDER — SODIUM CHLORIDE 0.9 % (FLUSH) 0.9 %
5-40 SYRINGE (ML) INJECTION PRN
OUTPATIENT
Start: 2025-05-02

## 2025-05-02 RX ORDER — PALONOSETRON 0.05 MG/ML
0.25 INJECTION, SOLUTION INTRAVENOUS ONCE
Status: CANCELLED | OUTPATIENT
Start: 2025-05-02 | End: 2025-05-02

## 2025-05-02 RX ORDER — FAMOTIDINE 10 MG/ML
20 INJECTION, SOLUTION INTRAVENOUS
Status: CANCELLED | OUTPATIENT
Start: 2025-05-02

## 2025-05-02 RX ORDER — DIPHENHYDRAMINE HYDROCHLORIDE 50 MG/ML
50 INJECTION, SOLUTION INTRAMUSCULAR; INTRAVENOUS
Status: CANCELLED | OUTPATIENT
Start: 2025-05-02

## 2025-05-02 RX ORDER — SODIUM CHLORIDE 9 MG/ML
INJECTION, SOLUTION INTRAVENOUS CONTINUOUS
OUTPATIENT
Start: 2025-05-02

## 2025-05-02 RX ORDER — PALONOSETRON 0.05 MG/ML
0.25 INJECTION, SOLUTION INTRAVENOUS ONCE
Status: COMPLETED | OUTPATIENT
Start: 2025-05-02 | End: 2025-05-02

## 2025-05-02 RX ORDER — FAMOTIDINE 10 MG/ML
20 INJECTION, SOLUTION INTRAVENOUS
OUTPATIENT
Start: 2025-05-02

## 2025-05-02 RX ORDER — EPINEPHRINE 1 MG/ML
0.3 INJECTION, SOLUTION, CONCENTRATE INTRAVENOUS PRN
Status: CANCELLED | OUTPATIENT
Start: 2025-05-02

## 2025-05-02 RX ORDER — ALBUTEROL SULFATE 90 UG/1
4 INHALANT RESPIRATORY (INHALATION) PRN
OUTPATIENT
Start: 2025-05-02

## 2025-05-02 RX ORDER — FAMOTIDINE 10 MG/ML
20 INJECTION, SOLUTION INTRAVENOUS ONCE
Status: COMPLETED | OUTPATIENT
Start: 2025-05-02 | End: 2025-05-02

## 2025-05-02 RX ORDER — EPINEPHRINE 1 MG/ML
0.3 INJECTION, SOLUTION, CONCENTRATE INTRAVENOUS PRN
OUTPATIENT
Start: 2025-05-02

## 2025-05-02 RX ORDER — ONDANSETRON 2 MG/ML
8 INJECTION INTRAMUSCULAR; INTRAVENOUS
OUTPATIENT
Start: 2025-05-02

## 2025-05-02 RX ORDER — DIPHENHYDRAMINE HYDROCHLORIDE 50 MG/ML
50 INJECTION, SOLUTION INTRAMUSCULAR; INTRAVENOUS ONCE
Status: CANCELLED | OUTPATIENT
Start: 2025-05-02 | End: 2025-05-02

## 2025-05-02 RX ORDER — FAMOTIDINE 10 MG/ML
20 INJECTION, SOLUTION INTRAVENOUS ONCE
Status: CANCELLED | OUTPATIENT
Start: 2025-05-02 | End: 2025-05-02

## 2025-05-02 RX ORDER — HYDROCORTISONE SODIUM SUCCINATE 100 MG/2ML
100 INJECTION INTRAMUSCULAR; INTRAVENOUS
Status: CANCELLED | OUTPATIENT
Start: 2025-05-02

## 2025-05-02 RX ORDER — DIPHENHYDRAMINE HYDROCHLORIDE 50 MG/ML
50 INJECTION, SOLUTION INTRAMUSCULAR; INTRAVENOUS
OUTPATIENT
Start: 2025-05-02

## 2025-05-02 RX ORDER — ACETAMINOPHEN 325 MG/1
650 TABLET ORAL
OUTPATIENT
Start: 2025-05-02

## 2025-05-02 RX ORDER — HYDROCORTISONE SODIUM SUCCINATE 100 MG/2ML
100 INJECTION INTRAMUSCULAR; INTRAVENOUS
OUTPATIENT
Start: 2025-05-02

## 2025-05-02 RX ORDER — HEPARIN 100 UNIT/ML
500 SYRINGE INTRAVENOUS PRN
OUTPATIENT
Start: 2025-05-02

## 2025-05-02 RX ORDER — DIPHENHYDRAMINE HYDROCHLORIDE 50 MG/ML
50 INJECTION, SOLUTION INTRAMUSCULAR; INTRAVENOUS ONCE
Status: COMPLETED | OUTPATIENT
Start: 2025-05-02 | End: 2025-05-02

## 2025-05-02 RX ORDER — SODIUM CHLORIDE 9 MG/ML
5-250 INJECTION, SOLUTION INTRAVENOUS PRN
Status: DISCONTINUED | OUTPATIENT
Start: 2025-05-02 | End: 2025-05-03 | Stop reason: HOSPADM

## 2025-05-02 RX ORDER — SODIUM CHLORIDE 9 MG/ML
INJECTION, SOLUTION INTRAVENOUS CONTINUOUS
Status: CANCELLED | OUTPATIENT
Start: 2025-05-02

## 2025-05-02 RX ORDER — ONDANSETRON 2 MG/ML
8 INJECTION INTRAMUSCULAR; INTRAVENOUS
Status: CANCELLED | OUTPATIENT
Start: 2025-05-02

## 2025-05-02 RX ADMIN — PALONOSETRON 0.25 MG: 0.05 INJECTION, SOLUTION INTRAVENOUS at 12:27

## 2025-05-02 RX ADMIN — CARBOPLATIN 220 MG: 10 INJECTION INTRAVENOUS at 14:21

## 2025-05-02 RX ADMIN — PACLITAXEL 96 MG: 6 INJECTION, SOLUTION INTRAVENOUS at 13:16

## 2025-05-02 RX ADMIN — SODIUM CHLORIDE, PRESERVATIVE FREE 10 ML: 5 INJECTION INTRAVENOUS at 14:53

## 2025-05-02 RX ADMIN — HEPARIN 500 UNITS: 100 SYRINGE at 14:53

## 2025-05-02 RX ADMIN — SODIUM CHLORIDE 25 ML/HR: 0.9 INJECTION, SOLUTION INTRAVENOUS at 12:27

## 2025-05-02 RX ADMIN — DEXAMETHASONE SODIUM PHOSPHATE 10 MG: 10 INJECTION, SOLUTION INTRAMUSCULAR; INTRAVENOUS at 12:31

## 2025-05-02 RX ADMIN — DIPHENHYDRAMINE HYDROCHLORIDE 50 MG: 50 INJECTION INTRAMUSCULAR; INTRAVENOUS at 12:29

## 2025-05-02 RX ADMIN — SODIUM CHLORIDE, PRESERVATIVE FREE 10 ML: 5 INJECTION INTRAVENOUS at 10:38

## 2025-05-02 RX ADMIN — FAMOTIDINE 20 MG: 10 INJECTION, SOLUTION INTRAVENOUS at 12:28

## 2025-05-05 ENCOUNTER — HOSPITAL ENCOUNTER (OUTPATIENT)
Dept: RADIATION ONCOLOGY | Age: 65
Discharge: HOME | End: 2025-05-05
Payer: MEDICARE

## 2025-05-05 PROCEDURE — 77386 HC NTSTY MODUL RAD TX DLVR CPLX: CPT | Performed by: RADIOLOGY

## 2025-05-06 ENCOUNTER — SOCIAL WORK (OUTPATIENT)
Age: 65
End: 2025-05-06

## 2025-05-06 ENCOUNTER — HOSPITAL ENCOUNTER (OUTPATIENT)
Dept: RADIATION ONCOLOGY | Age: 65
Discharge: HOME | End: 2025-05-06
Payer: MEDICARE

## 2025-05-06 PROCEDURE — 77386 HC NTSTY MODUL RAD TX DLVR CPLX: CPT | Performed by: RADIOLOGY

## 2025-05-06 NOTE — PROGRESS NOTES
set up ride through insurance provider for 5/12-5/13 @ 2:15 pm to [place]. Patient rides are out after May 13th, appeal for further rides must be made.     Transportation details:  [facility] [phone]   1 pm 5/12-5/13  Confirmation #[id number], #[id number]   3 pm return ride 5/12-5/13

## 2025-05-06 NOTE — PROGRESS NOTES
set up ride through insurance provider for 5/14 & 5/15 @ 2:15 pm and 5/16 @ 11 am to [facility].   Transportation details:  [place] [phone]   1 pm 5/14 & 15, pickup at 9:45 am 5/16  Confirmation #[id number], #[id number], #[id number]  3 pm return ride 5/14-15, will call 5/16

## 2025-05-07 ENCOUNTER — HOSPITAL ENCOUNTER (OUTPATIENT)
Dept: RADIATION ONCOLOGY | Age: 65
Discharge: HOME | End: 2025-05-07
Payer: MEDICARE

## 2025-05-07 VITALS
WEIGHT: 202.8 LBS | TEMPERATURE: 98.3 F | DIASTOLIC BLOOD PRESSURE: 71 MMHG | HEART RATE: 83 BPM | OXYGEN SATURATION: 100 % | BODY MASS INDEX: 27.5 KG/M2 | SYSTOLIC BLOOD PRESSURE: 115 MMHG | RESPIRATION RATE: 16 BRPM

## 2025-05-07 PROCEDURE — 77386 HC NTSTY MODUL RAD TX DLVR CPLX: CPT | Performed by: RADIOLOGY

## 2025-05-07 NOTE — PROGRESS NOTES
Vic Nunez Sr.  5/7/2025  Wt Readings from Last 3 Encounters:   05/07/25 92 kg (202 lb 12.8 oz)   05/02/25 92.1 kg (203 lb 2 oz)   04/30/25 91.4 kg (201 lb 9.6 oz)     Body mass index is 27.5 kg/m².        Treatment Area:  Right Lung Cancer w/ Weekly Carbo/Taxol    Patient was seen today for weekly visit.      Comfort Alteration  Fatigue: None    Ventilation Alterations  Cough: Occasional  Hemoptysis: No  Mucus Color: N/A  Dyspnea: No      Nutritional Alteration  Anorexia: No  Nausea: Occasional-antiemetics helpful  Vomiting: No     Mucous Membrane Alteration  Voice Changes/ Stridor/Larynx: no  Pharynx & Esophagus: wnl    Elimination Alterations  Constipation: no  Diarrhea:  no    Skin Alteration   Sensation: wnl    Radiation Dermatitis:  Intact [x]     Erythema  []     Discoloration  []     Rash []     Dry desquamation  []     Moist desquamation []       Emotional  Coping: effective      Injury, potential bleeding or infection: none    Lab Results   Component Value Date    WBC 4.2 05/02/2025    HGB 12.9 (L) 05/02/2025    HCT 39.1 (L) 05/02/2025     05/02/2025         /71   Pulse 83   Temp 98.3 °F (36.8 °C) (Temporal)   Resp 16   Wt 92 kg (202 lb 12.8 oz)   SpO2 100%   BMI 27.50 kg/m²      Pain Assessment: None - Denies Pain            Assessment/Plan: Patient was seen today for weekly visit.He arrives ambulatory with steady gait.  Denies new respiratory symptoms.  Reports nausea for which antiemetics are helpful.  He continues to smoke marijuana-states this helps with his back pain.  Dr. Junior evaluated patient. Continue plan of care.     Prudence Myers RN  
Status   05/02/2025 2.40 1.8 - 7.7 k/uL Final   04/25/2025 2.40 1.8 - 7.7 k/uL Final   04/18/2025 3.30 1.8 - 7.7 k/uL Final   05/03/2021 3.16 1.50 - 8.10 k/uL Final   05/30/2014 4.00 1.8 - 7.7 k/uL Final     Hemoglobin   Date Value Ref Range Status   05/02/2025 12.9 (L) 13.5 - 17.5 g/dL Final   04/25/2025 13.3 (L) 13.5 - 17.5 g/dL Final   04/18/2025 13.6 13.5 - 17.5 g/dL Final     Platelets   Date Value Ref Range Status   05/02/2025 222 140 - 450 k/uL Final   04/25/2025 243 140 - 450 k/uL Final   04/18/2025 227 140 - 450 k/uL Final     Creatinine   Date Value Ref Range Status   05/02/2025 1.0 0.7 - 1.2 mg/dL Final   04/25/2025 1.1 0.7 - 1.2 mg/dL Final   04/18/2025 1.0 0.7 - 1.2 mg/dL Final       MEDICATIONS:    Current Outpatient Medications:     lidocaine-prilocaine (EMLA) 2.5-2.5 % cream, Apply topically to port site 45-60 minutes prior to needle poke as needed., Disp: 1 each, Rfl: 2    ondansetron (ZOFRAN-ODT) 8 MG TBDP disintegrating tablet, Take 1 tablet by mouth every 8 hours as needed for Nausea or Vomiting, Disp: 90 tablet, Rfl: 3    budesonide-formoterol (SYMBICORT) 160-4.5 MCG/ACT AERO, Inhale 2 puffs into the lungs 2 times daily, Disp: 10.2 g, Rfl: 11    Vitamin D (CHOLECALCIFEROL) 25 MCG (1000 UT) TABS tablet, Take 1 tablet by mouth daily, Disp: , Rfl:     DULoxetine (CYMBALTA) 30 MG extended release capsule, TAKE ONE CAPSULE BY MOUTH ONCE A DAY, Disp: 90 capsule, Rfl: 0    albuterol sulfate HFA (VENTOLIN HFA) 108 (90 Base) MCG/ACT inhaler, Inhale 2 puffs into the lungs every 6 hours as needed for Wheezing or Shortness of Breath, Disp: 18 g, Rfl: 5    atorvastatin (LIPITOR) 20 MG tablet, Take 1 tablet by mouth daily, Disp: 100 tablet, Rfl: 0    metFORMIN (GLUCOPHAGE-XR) 500 MG extended release tablet, Take 1 tablet by mouth daily (with breakfast), Disp: 90 tablet, Rfl: 0      ASSESSMENT PLAN:   Treatment setup and plan reviewed. Port images/CBCT images reviewed. Appropriate laboratory work was reviewed.

## 2025-05-07 NOTE — PROGRESS NOTES
_     Chief Complaint   Patient presents with    Follow-up     2 week follow up.        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  Clinical stage T3 N0 M0    CURRENT THERAPY:    Status post resection followed by adjuvant chemotherapy in 2010..    Further workup and management of newly diagnosed right lung squamous cell carcinoma.    Plan for chemoradiation followed by immunotherapy treatment. Chemoradiation started 4/18/25.     BRIEF CASE HISTORY:        Mr. Vic Nunez is a very pleasant 64 y.o. male with history of multiple co morbidities as listed.  He is referred for further management of recently diagnosed lung cancer.  Patient had history of l right lung adenocarcinoma in 2010.  He was treated with resection followed by 4 cycles of adjuvant chemotherapy treatment.  He was in remission since then.  He was last seen in January 2017.  He was lost for follow-up since then.  Recently patient had complaints of back pain.  He had evaluation with imaging and incidentally found to have right lower lobe lung lesion.  He had evaluation by pulmonary and he had bronchoscopy and biopsy.  Results positive for squamous cell carcinoma.  Clinically patient does not have significant respiratory symptoms.  He has occasional cough and sputum.  No hemoptysis.  No chest pain.  No fever or infections.  No headaches.    Patient quit smoking in 2024.    INTERIM HISTORY:   Evaluated today for further management of recently diagnosed squamous cell carcinoma of the right lung. No symptoms at the present time. No cough. No hemoptysis.  Started on chemoradiation. Tolerated well so far. No side effects.         PAST MEDICAL HISTORY: has a past medical history of Arthritis, Cancer (HCC), Cataract, Chronic back pain, COPD (chronic obstructive pulmonary disease)

## 2025-05-08 ENCOUNTER — TELEPHONE (OUTPATIENT)
Age: 65
End: 2025-05-08

## 2025-05-08 ENCOUNTER — HOSPITAL ENCOUNTER (OUTPATIENT)
Dept: RADIATION ONCOLOGY | Age: 65
Discharge: HOME | End: 2025-05-08
Payer: MEDICARE

## 2025-05-08 PROCEDURE — 77386 HC NTSTY MODUL RAD TX DLVR CPLX: CPT | Performed by: RADIOLOGY

## 2025-05-08 NOTE — TELEPHONE ENCOUNTER
Name: Vic Nunez .  : 1960  MRN: [id number]    Oncology Navigation Follow-Up Note    Contact Type:  Telephone    Notes: Navigator spoke with pt. Offering assistance if needed. No barriers to care noted.       Electronically signed by Jessica Moeller RN on 2025 at 11:05 AM

## 2025-05-09 ENCOUNTER — HOSPITAL ENCOUNTER (OUTPATIENT)
Dept: INFUSION THERAPY | Age: 65
Discharge: HOME | End: 2025-05-09
Payer: MEDICARE

## 2025-05-09 ENCOUNTER — OFFICE VISIT (OUTPATIENT)
Age: 65
End: 2025-05-09
Payer: MEDICARE

## 2025-05-09 ENCOUNTER — TELEPHONE (OUTPATIENT)
Age: 65
End: 2025-05-09

## 2025-05-09 ENCOUNTER — HOSPITAL ENCOUNTER (OUTPATIENT)
Dept: RADIATION ONCOLOGY | Age: 65
Discharge: HOME | End: 2025-05-09
Payer: MEDICARE

## 2025-05-09 VITALS
RESPIRATION RATE: 16 BRPM | BODY MASS INDEX: 27.67 KG/M2 | WEIGHT: 204 LBS | SYSTOLIC BLOOD PRESSURE: 144 MMHG | DIASTOLIC BLOOD PRESSURE: 90 MMHG | TEMPERATURE: 97.3 F | OXYGEN SATURATION: 95 % | HEART RATE: 78 BPM

## 2025-05-09 DIAGNOSIS — C78.01 SQUAMOUS CELL CARCINOMA METASTATIC TO BRONCHUS OF RIGHT LOWER LOBE (HCC): Primary | ICD-10-CM

## 2025-05-09 DIAGNOSIS — C44.92 SQUAMOUS CELL CARCINOMA METASTATIC TO BRONCHUS OF RIGHT LOWER LOBE (HCC): Primary | ICD-10-CM

## 2025-05-09 LAB
ALBUMIN SERPL-MCNC: 4.2 G/DL (ref 3.5–5.2)
ALBUMIN/GLOB SERPL: 1.6 {RATIO} (ref 1–2.5)
ALP SERPL-CCNC: 77 U/L (ref 40–129)
ALT SERPL-CCNC: 30 U/L (ref 10–50)
ANION GAP SERPL CALCULATED.3IONS-SCNC: 9 MMOL/L (ref 9–16)
AST SERPL-CCNC: 27 U/L (ref 10–50)
BASOPHILS # BLD: 0 K/UL (ref 0–0.2)
BASOPHILS NFR BLD: 1 % (ref 0–2)
BILIRUB SERPL-MCNC: 1 MG/DL (ref 0–1.2)
BUN SERPL-MCNC: 11 MG/DL (ref 8–23)
CALCIUM SERPL-MCNC: 9.8 MG/DL (ref 8.6–10.4)
CHLORIDE SERPL-SCNC: 101 MMOL/L (ref 98–107)
CO2 SERPL-SCNC: 27 MMOL/L (ref 20–31)
CREAT SERPL-MCNC: 1.2 MG/DL (ref 0.7–1.2)
EOSINOPHIL # BLD: 0 K/UL (ref 0–0.4)
EOSINOPHILS RELATIVE PERCENT: 1 % (ref 1–4)
ERYTHROCYTE [DISTWIDTH] IN BLOOD BY AUTOMATED COUNT: 14 % (ref 12.5–15.4)
GFR, ESTIMATED: 67 ML/MIN/1.73M2
GLUCOSE SERPL-MCNC: 98 MG/DL (ref 74–99)
HCT VFR BLD AUTO: 40.5 % (ref 41–53)
HGB BLD-MCNC: 13.4 G/DL (ref 13.5–17.5)
LYMPHOCYTES NFR BLD: 0.8 K/UL (ref 1–4.8)
LYMPHOCYTES RELATIVE PERCENT: 22 % (ref 24–44)
MCH RBC QN AUTO: 30.5 PG (ref 26–34)
MCHC RBC AUTO-ENTMCNC: 33 G/DL (ref 31–37)
MCV RBC AUTO: 92.3 FL (ref 80–100)
MONOCYTES NFR BLD: 0.4 K/UL (ref 0.1–1.2)
MONOCYTES NFR BLD: 10 % (ref 2–11)
NEUTROPHILS NFR BLD: 66 % (ref 36–66)
NEUTS SEG NFR BLD: 2.5 K/UL (ref 1.8–7.7)
PLATELET # BLD AUTO: 229 K/UL (ref 140–450)
PMV BLD AUTO: 6.7 FL (ref 6–12)
POTASSIUM SERPL-SCNC: 3.9 MMOL/L (ref 3.7–5.3)
PROT SERPL-MCNC: 6.9 G/DL (ref 6.6–8.7)
RBC # BLD AUTO: 4.39 M/UL (ref 4.5–5.9)
SODIUM SERPL-SCNC: 137 MMOL/L (ref 136–145)
WBC OTHER # BLD: 3.8 K/UL (ref 3.5–11)

## 2025-05-09 PROCEDURE — 96413 CHEMO IV INFUSION 1 HR: CPT

## 2025-05-09 PROCEDURE — 3017F COLORECTAL CA SCREEN DOC REV: CPT | Performed by: INTERNAL MEDICINE

## 2025-05-09 PROCEDURE — G8427 DOCREV CUR MEDS BY ELIG CLIN: HCPCS | Performed by: INTERNAL MEDICINE

## 2025-05-09 PROCEDURE — 6360000002 HC RX W HCPCS: Performed by: INTERNAL MEDICINE

## 2025-05-09 PROCEDURE — 1159F MED LIST DOCD IN RCRD: CPT | Performed by: INTERNAL MEDICINE

## 2025-05-09 PROCEDURE — 96417 CHEMO IV INFUS EACH ADDL SEQ: CPT

## 2025-05-09 PROCEDURE — 2500000003 HC RX 250 WO HCPCS: Performed by: INTERNAL MEDICINE

## 2025-05-09 PROCEDURE — 1036F TOBACCO NON-USER: CPT | Performed by: INTERNAL MEDICINE

## 2025-05-09 PROCEDURE — 1123F ACP DISCUSS/DSCN MKR DOCD: CPT | Performed by: INTERNAL MEDICINE

## 2025-05-09 PROCEDURE — 1126F AMNT PAIN NOTED NONE PRSNT: CPT | Performed by: INTERNAL MEDICINE

## 2025-05-09 PROCEDURE — 96375 TX/PRO/DX INJ NEW DRUG ADDON: CPT

## 2025-05-09 PROCEDURE — 80053 COMPREHEN METABOLIC PANEL: CPT

## 2025-05-09 PROCEDURE — 77386 HC NTSTY MODUL RAD TX DLVR CPLX: CPT | Performed by: RADIOLOGY

## 2025-05-09 PROCEDURE — 36415 COLL VENOUS BLD VENIPUNCTURE: CPT

## 2025-05-09 PROCEDURE — 99214 OFFICE O/P EST MOD 30 MIN: CPT | Performed by: INTERNAL MEDICINE

## 2025-05-09 PROCEDURE — 85025 COMPLETE CBC W/AUTO DIFF WBC: CPT

## 2025-05-09 PROCEDURE — G8417 CALC BMI ABV UP PARAM F/U: HCPCS | Performed by: INTERNAL MEDICINE

## 2025-05-09 PROCEDURE — 2580000003 HC RX 258: Performed by: INTERNAL MEDICINE

## 2025-05-09 RX ORDER — HYDROCORTISONE SODIUM SUCCINATE 100 MG/2ML
100 INJECTION INTRAMUSCULAR; INTRAVENOUS
Status: CANCELLED | OUTPATIENT
Start: 2025-05-09

## 2025-05-09 RX ORDER — SODIUM CHLORIDE 9 MG/ML
INJECTION, SOLUTION INTRAVENOUS CONTINUOUS
Status: CANCELLED | OUTPATIENT
Start: 2025-05-09

## 2025-05-09 RX ORDER — PALONOSETRON 0.05 MG/ML
0.25 INJECTION, SOLUTION INTRAVENOUS ONCE
Status: COMPLETED | OUTPATIENT
Start: 2025-05-09 | End: 2025-05-09

## 2025-05-09 RX ORDER — DIPHENHYDRAMINE HYDROCHLORIDE 50 MG/ML
50 INJECTION, SOLUTION INTRAMUSCULAR; INTRAVENOUS
Status: CANCELLED | OUTPATIENT
Start: 2025-05-09

## 2025-05-09 RX ORDER — PALONOSETRON 0.05 MG/ML
0.25 INJECTION, SOLUTION INTRAVENOUS ONCE
Status: CANCELLED | OUTPATIENT
Start: 2025-05-09 | End: 2025-05-09

## 2025-05-09 RX ORDER — ONDANSETRON 2 MG/ML
8 INJECTION INTRAMUSCULAR; INTRAVENOUS
Status: CANCELLED | OUTPATIENT
Start: 2025-05-09

## 2025-05-09 RX ORDER — DIPHENHYDRAMINE HYDROCHLORIDE 50 MG/ML
50 INJECTION, SOLUTION INTRAMUSCULAR; INTRAVENOUS ONCE
Status: COMPLETED | OUTPATIENT
Start: 2025-05-09 | End: 2025-05-09

## 2025-05-09 RX ORDER — FAMOTIDINE 10 MG/ML
20 INJECTION, SOLUTION INTRAVENOUS ONCE
Status: COMPLETED | OUTPATIENT
Start: 2025-05-09 | End: 2025-05-09

## 2025-05-09 RX ORDER — FAMOTIDINE 10 MG/ML
20 INJECTION, SOLUTION INTRAVENOUS ONCE
Status: CANCELLED | OUTPATIENT
Start: 2025-05-09 | End: 2025-05-09

## 2025-05-09 RX ORDER — ALBUTEROL SULFATE 90 UG/1
4 INHALANT RESPIRATORY (INHALATION) PRN
Status: CANCELLED | OUTPATIENT
Start: 2025-05-09

## 2025-05-09 RX ORDER — MEPERIDINE HYDROCHLORIDE 50 MG/ML
12.5 INJECTION INTRAMUSCULAR; INTRAVENOUS; SUBCUTANEOUS PRN
Status: CANCELLED | OUTPATIENT
Start: 2025-05-09

## 2025-05-09 RX ORDER — SODIUM CHLORIDE 0.9 % (FLUSH) 0.9 %
5-40 SYRINGE (ML) INJECTION PRN
Status: DISCONTINUED | OUTPATIENT
Start: 2025-05-09 | End: 2025-05-10 | Stop reason: HOSPADM

## 2025-05-09 RX ORDER — SODIUM CHLORIDE 9 MG/ML
5-250 INJECTION, SOLUTION INTRAVENOUS PRN
Status: DISCONTINUED | OUTPATIENT
Start: 2025-05-09 | End: 2025-05-10 | Stop reason: HOSPADM

## 2025-05-09 RX ORDER — HEPARIN 100 UNIT/ML
500 SYRINGE INTRAVENOUS PRN
Status: DISCONTINUED | OUTPATIENT
Start: 2025-05-09 | End: 2025-05-10 | Stop reason: HOSPADM

## 2025-05-09 RX ORDER — DIPHENHYDRAMINE HYDROCHLORIDE 50 MG/ML
50 INJECTION, SOLUTION INTRAMUSCULAR; INTRAVENOUS ONCE
Status: CANCELLED | OUTPATIENT
Start: 2025-05-09 | End: 2025-05-09

## 2025-05-09 RX ORDER — FAMOTIDINE 10 MG/ML
20 INJECTION, SOLUTION INTRAVENOUS
Status: CANCELLED | OUTPATIENT
Start: 2025-05-09

## 2025-05-09 RX ORDER — ACETAMINOPHEN 325 MG/1
650 TABLET ORAL
Status: CANCELLED | OUTPATIENT
Start: 2025-05-09

## 2025-05-09 RX ORDER — SODIUM CHLORIDE 9 MG/ML
5-250 INJECTION, SOLUTION INTRAVENOUS PRN
Status: CANCELLED | OUTPATIENT
Start: 2025-05-09

## 2025-05-09 RX ORDER — EPINEPHRINE 1 MG/ML
0.3 INJECTION, SOLUTION, CONCENTRATE INTRAVENOUS PRN
Status: CANCELLED | OUTPATIENT
Start: 2025-05-09

## 2025-05-09 RX ORDER — DEXAMETHASONE SODIUM PHOSPHATE 10 MG/ML
10 INJECTION, SOLUTION INTRAMUSCULAR; INTRAVENOUS ONCE
Status: COMPLETED | OUTPATIENT
Start: 2025-05-09 | End: 2025-05-09

## 2025-05-09 RX ORDER — HYDROCODONE BITARTRATE AND ACETAMINOPHEN 5; 325 MG/1; MG/1
1 TABLET ORAL EVERY 6 HOURS PRN
Qty: 56 TABLET | Refills: 0 | Status: SHIPPED | OUTPATIENT
Start: 2025-05-09 | End: 2025-05-23

## 2025-05-09 RX ADMIN — CARBOPLATIN 190 MG: 10 INJECTION INTRAVENOUS at 13:42

## 2025-05-09 RX ADMIN — FAMOTIDINE 20 MG: 10 INJECTION, SOLUTION INTRAVENOUS at 11:53

## 2025-05-09 RX ADMIN — SODIUM CHLORIDE, PRESERVATIVE FREE 10 ML: 5 INJECTION INTRAVENOUS at 14:23

## 2025-05-09 RX ADMIN — DIPHENHYDRAMINE HYDROCHLORIDE 50 MG: 50 INJECTION INTRAMUSCULAR; INTRAVENOUS at 11:53

## 2025-05-09 RX ADMIN — HEPARIN 500 UNITS: 100 SYRINGE at 14:23

## 2025-05-09 RX ADMIN — PACLITAXEL 96 MG: 6 INJECTION, SOLUTION INTRAVENOUS at 12:32

## 2025-05-09 RX ADMIN — PALONOSETRON 0.25 MG: 0.05 INJECTION, SOLUTION INTRAVENOUS at 11:53

## 2025-05-09 RX ADMIN — SODIUM CHLORIDE, PRESERVATIVE FREE 10 ML: 5 INJECTION INTRAVENOUS at 10:41

## 2025-05-09 RX ADMIN — SODIUM CHLORIDE 150 ML/HR: 0.9 INJECTION, SOLUTION INTRAVENOUS at 11:52

## 2025-05-09 RX ADMIN — DEXAMETHASONE SODIUM PHOSPHATE 10 MG: 10 INJECTION, SOLUTION INTRAMUSCULAR; INTRAVENOUS at 11:53

## 2025-05-09 NOTE — TELEPHONE ENCOUNTER
Instructions   from Dr. Henri Oliveira MD    Proceed with chemotherapy as ordered after checking labs.   RV 1 week      Tx today  RV 5/16/25 at 10:30 am with tx to follow up

## 2025-05-09 NOTE — PROGRESS NOTES
Pt here for C.4D.1. taxol, carbo   Arrives ambulatory.  Denies any new complaints.  Labs drawn from port, results reviewed.  Pt was seen by Dr. Hidalgo, order rec'd to proceed with tx.  Tx complete without incident.  Pt d/c'd in stable condition.  Returns 5/16/2025 for office visit and C5D1.

## 2025-05-12 ENCOUNTER — HOSPITAL ENCOUNTER (OUTPATIENT)
Dept: RADIATION ONCOLOGY | Age: 65
Discharge: HOME | End: 2025-05-12
Payer: MEDICARE

## 2025-05-12 PROCEDURE — 77386 HC NTSTY MODUL RAD TX DLVR CPLX: CPT | Performed by: RADIOLOGY

## 2025-05-13 ENCOUNTER — HOSPITAL ENCOUNTER (OUTPATIENT)
Dept: RADIATION ONCOLOGY | Age: 65
Discharge: HOME | End: 2025-05-13
Payer: MEDICARE

## 2025-05-13 ENCOUNTER — SOCIAL WORK (OUTPATIENT)
Age: 65
End: 2025-05-13

## 2025-05-13 DIAGNOSIS — C78.01 SQUAMOUS CELL CARCINOMA METASTATIC TO BRONCHUS OF RIGHT LOWER LOBE (HCC): ICD-10-CM

## 2025-05-13 DIAGNOSIS — C44.92 SQUAMOUS CELL CARCINOMA METASTATIC TO BRONCHUS OF RIGHT LOWER LOBE (HCC): ICD-10-CM

## 2025-05-13 PROCEDURE — 77386 HC NTSTY MODUL RAD TX DLVR CPLX: CPT | Performed by: RADIOLOGY

## 2025-05-13 RX ORDER — HYDROCODONE BITARTRATE AND ACETAMINOPHEN 10; 325 MG/1; MG/1
1 TABLET ORAL EVERY 6 HOURS PRN
Qty: 56 TABLET | Refills: 0 | Status: SHIPPED | OUTPATIENT
Start: 2025-05-13 | End: 2025-05-27

## 2025-05-13 NOTE — PROGRESS NOTES
Component Value Date    WBC 3.8 05/09/2025    HGB 13.4 (L) 05/09/2025    HCT 40.5 (L) 05/09/2025    MCV 92.3 05/09/2025     05/09/2025       Chemistry        Component Value Date/Time     05/09/2025 1047    K 3.9 05/09/2025 1047     05/09/2025 1047    CO2 27 05/09/2025 1047    BUN 11 05/09/2025 1047    CREATININE 1.2 05/09/2025 1047        Component Value Date/Time    CALCIUM 9.8 05/09/2025 1047    ALKPHOS 77 05/09/2025 1047    AST 27 05/09/2025 1047    ALT 30 05/09/2025 1047    BILITOT 1.0 05/09/2025 1047          @[facility]@      IMPRESSION:   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  Quit 2024    PLAN: Records, labs and images were reviewed and discussed with the patient. I explained to the patient the nature of this problem with lung cancer and underlying cause and management plan.  Patient understands that the course of this cancer is smoking.  He quit 2024.  Patient had past history of right lung adenocarcinoma status postresection and adjuvant chemotherapy in 2010.  He is not a surgical candidate.  Discussed the images and I had recommendations based on NCCN guidelines.  We recommend combined chemoradiation with weekly Taxol and carboplatin followed by immunotherapy using Imfinzi.  Explained benefits and side effects. I explained the benefits and possible side effects related to chemotherapy, which may include but not limited to nausea, vomiting, hair loss, mouth sores, allergy, neuropathy, fever infection sepsis, anemia and thrombocytopenia.  I reviewed labs as stated above and discussed them with the patient. Labs are adequate for chemotherapy treatment. We will proceed with chemotherapy as planned with no adjustment. Patient was reminded about potential side effects to treatment. We will continue to monitor labs.   So far tolerated

## 2025-05-13 NOTE — PROGRESS NOTES
set up ride through insurance provider for 5/19 & 5/22 @ 2:15 pm and 5/23 @ 11 am to [facility].   Transportation details:  [place] [phone]   1 pm 5/19-22, pickup at 10 am 5/23  Confirmation #[id number], #[id number], #[id number], #[id number]  3 pm return ride 5/19-22, will call 5/23

## 2025-05-14 ENCOUNTER — TELEPHONE (OUTPATIENT)
Age: 65
End: 2025-05-14

## 2025-05-14 ENCOUNTER — APPOINTMENT (OUTPATIENT)
Dept: RADIATION ONCOLOGY | Age: 65
End: 2025-05-14
Payer: MEDICARE

## 2025-05-14 PROCEDURE — 77336 RADIATION PHYSICS CONSULT: CPT | Performed by: RADIOLOGY

## 2025-05-14 NOTE — TELEPHONE ENCOUNTER
received call from Radiation stating they tried reaching out to patient to let him know his appointment is cancelled today because of machine issues but could not get a hold of him.  called insurance provider and cancelled ride.  called patient and let him know about cancellation and that he will resume tomorrow.

## 2025-05-15 ENCOUNTER — TELEPHONE (OUTPATIENT)
Age: 65
End: 2025-05-15

## 2025-05-15 ENCOUNTER — HOSPITAL ENCOUNTER (OUTPATIENT)
Dept: RADIATION ONCOLOGY | Age: 65
Discharge: HOME | End: 2025-05-15
Payer: MEDICARE

## 2025-05-15 VITALS
DIASTOLIC BLOOD PRESSURE: 71 MMHG | WEIGHT: 199.6 LBS | BODY MASS INDEX: 27.07 KG/M2 | SYSTOLIC BLOOD PRESSURE: 118 MMHG | TEMPERATURE: 98 F | HEART RATE: 71 BPM | OXYGEN SATURATION: 99 % | RESPIRATION RATE: 16 BRPM

## 2025-05-15 PROCEDURE — 77386 HC NTSTY MODUL RAD TX DLVR CPLX: CPT | Performed by: RADIOLOGY

## 2025-05-15 NOTE — PROGRESS NOTES
[facility]       Radiation Oncology          [address]          [place] [id number]        O: [phone]        F: [phone]       [facility]             RADIATION ONCOLOGY WEEKLY PROGRESS NOTE  Patient ID:   Vic Nunez Sr.  : 1960   MRN: [id number]    Location:  [facility] Radiation Oncology,   [address] [place] [address]., [place] [address]   [phone]    DIAGNOSIS:  Squamous cell carcinoma of the right lower lobe T3 N0 M0     TREATMENT DETAILS:  Treatment Site: RLL  Actual Dose: 3800cGy  Total Planned Dose: 6000cGy  Treatment Technique: IMRT  Fraction Technique: Daily  Therapy imaging monitoring: CBCT daily  Concurrent Chemotherapy: Weekly CarboTaxol    SUBJECTIVE:   Patient seen for their weekly on treatment evaluation today.  Reports chronic back pain and is taking Norco with some relief, no changes in breathing, no dysphagia    OBJECTIVE:     ECO Asymptomatic    VITAL SIGNS: /71   Pulse 71   Temp 98 °F (36.7 °C) (Temporal)   Resp 16   Wt 90.5 kg (199 lb 9.6 oz)   SpO2 99%   BMI 27.07 kg/m²   Wt Readings from Last 5 Encounters:   05/15/25 90.5 kg (199 lb 9.6 oz)   25 92.5 kg (204 lb)   25 92 kg (202 lb 12.8 oz)   25 92.1 kg (203 lb 2 oz)   25 91.4 kg (201 lb 9.6 oz)     GENERAL:  General appearance is that of a well-nourished, well-developed in no apparent distress.  HEART:  Normal rate and regular rhythm  LUNGS:  Pulmonary effort normal.  ABDOMEN:  Soft, nontender, non distended  EXTREMITIES:  No edema.  No calf tenderness.  MSK:  No spinal tenderness. Normal ROM.  NEUROLOGICAL: Alert and oriented. Strength and sensation intact bilaterally. No focal deficits.   PSYCH: Mood normal, behavior normal.      LABS:  WBC   Date Value Ref Range Status   2025 3.8 3.5 - 11.0 k/uL Final   2025 4.2 3.5 - 11.0 k/uL Final   2025 4.3 3.5 - 11.0 k/uL Final     Neutrophils Absolute   Date Value Ref

## 2025-05-15 NOTE — PROGRESS NOTES
Homiguel Nunez Sr.  5/15/2025  Wt Readings from Last 3 Encounters:   05/15/25 90.5 kg (199 lb 9.6 oz)   05/09/25 92.5 kg (204 lb)   05/07/25 92 kg (202 lb 12.8 oz)     Body mass index is 27.07 kg/m².        Treatment Area:  Right Lung Cancer w/ Weekly Carbo/Taxol    Patient was seen today for weekly visit.      Comfort Alteration  Fatigue: None    Ventilation Alterations  Cough: Occasional  Hemoptysis: No  Mucus Color: N/A  Dyspnea: No      Nutritional Alteration  Anorexia: No  Nausea: Occasional-antiemetics helpful  Vomiting: No     Mucous Membrane Alteration  Voice Changes/ Stridor/Larynx: no  Pharynx & Esophagus: wnl    Elimination Alterations  Constipation: no  Diarrhea:  no    Skin Alteration   Sensation: wnl    Radiation Dermatitis:  Intact [x]     Erythema  []     Discoloration  []     Rash []     Dry desquamation  []     Moist desquamation []       Emotional  Coping: effective      Injury, potential bleeding or infection: none    Lab Results   Component Value Date    WBC 3.8 05/09/2025    HGB 13.4 (L) 05/09/2025    HCT 40.5 (L) 05/09/2025     05/09/2025         /71   Pulse 71   Temp 98 °F (36.7 °C) (Temporal)   Resp 16   Wt 90.5 kg (199 lb 9.6 oz)   SpO2 99%   BMI 27.07 kg/m²      Pain Assessment: None - Denies Pain            Assessment/Plan: Patient was seen today for weekly visit. He arrives ambulatory with steady gait.  Patient denies new respiratory symptoms.  He reports occasional nausea for which antiemetics are helpful.  He continues to smoke marijuana-states this helps with his back pain. Writer recommended Ibuprofen for back/joint pain. Dr. Junior evaluated patient. Continue plan of care.     Prudence Myers RN

## 2025-05-16 ENCOUNTER — HOSPITAL ENCOUNTER (OUTPATIENT)
Dept: INFUSION THERAPY | Age: 65
Discharge: HOME | End: 2025-05-16
Payer: MEDICARE

## 2025-05-16 ENCOUNTER — OFFICE VISIT (OUTPATIENT)
Age: 65
End: 2025-05-16

## 2025-05-16 ENCOUNTER — HOSPITAL ENCOUNTER (OUTPATIENT)
Dept: RADIATION ONCOLOGY | Age: 65
Discharge: HOME | End: 2025-05-16
Payer: MEDICARE

## 2025-05-16 ENCOUNTER — TELEPHONE (OUTPATIENT)
Age: 65
End: 2025-05-16

## 2025-05-16 VITALS
WEIGHT: 202.1 LBS | SYSTOLIC BLOOD PRESSURE: 136 MMHG | TEMPERATURE: 97.6 F | BODY MASS INDEX: 27.41 KG/M2 | DIASTOLIC BLOOD PRESSURE: 86 MMHG | HEART RATE: 75 BPM | OXYGEN SATURATION: 95 % | RESPIRATION RATE: 14 BRPM

## 2025-05-16 DIAGNOSIS — C34.91 MALIGNANT NEOPLASM OF RIGHT LUNG, UNSPECIFIED PART OF LUNG (HCC): Primary | ICD-10-CM

## 2025-05-16 DIAGNOSIS — C44.92 SQUAMOUS CELL CARCINOMA METASTATIC TO BRONCHUS OF RIGHT LOWER LOBE (HCC): Primary | ICD-10-CM

## 2025-05-16 DIAGNOSIS — C44.92 SQUAMOUS CELL CARCINOMA METASTATIC TO BRONCHUS OF RIGHT LOWER LOBE (HCC): ICD-10-CM

## 2025-05-16 DIAGNOSIS — C78.01 SQUAMOUS CELL CARCINOMA METASTATIC TO BRONCHUS OF RIGHT LOWER LOBE (HCC): Primary | ICD-10-CM

## 2025-05-16 DIAGNOSIS — C78.01 SQUAMOUS CELL CARCINOMA METASTATIC TO BRONCHUS OF RIGHT LOWER LOBE (HCC): ICD-10-CM

## 2025-05-16 LAB
ALBUMIN SERPL-MCNC: 4.2 G/DL (ref 3.5–5.2)
ALBUMIN/GLOB SERPL: 1.6 {RATIO} (ref 1–2.5)
ALP SERPL-CCNC: 78 U/L (ref 40–129)
ALT SERPL-CCNC: 34 U/L (ref 10–50)
ANION GAP SERPL CALCULATED.3IONS-SCNC: 10 MMOL/L (ref 9–16)
AST SERPL-CCNC: 23 U/L (ref 10–50)
BASOPHILS # BLD: 0 K/UL (ref 0–0.2)
BASOPHILS NFR BLD: 1 % (ref 0–2)
BILIRUB SERPL-MCNC: 0.5 MG/DL (ref 0–1.2)
BUN SERPL-MCNC: 13 MG/DL (ref 8–23)
CALCIUM SERPL-MCNC: 10.4 MG/DL (ref 8.6–10.4)
CHLORIDE SERPL-SCNC: 101 MMOL/L (ref 98–107)
CO2 SERPL-SCNC: 27 MMOL/L (ref 20–31)
CREAT SERPL-MCNC: 1.1 MG/DL (ref 0.7–1.2)
EOSINOPHIL # BLD: 0 K/UL (ref 0–0.4)
EOSINOPHILS RELATIVE PERCENT: 1 % (ref 1–4)
ERYTHROCYTE [DISTWIDTH] IN BLOOD BY AUTOMATED COUNT: 14 % (ref 12.5–15.4)
GFR, ESTIMATED: 74 ML/MIN/1.73M2
GLUCOSE SERPL-MCNC: 105 MG/DL (ref 74–99)
HCT VFR BLD AUTO: 40.5 % (ref 41–53)
HGB BLD-MCNC: 13.5 G/DL (ref 13.5–17.5)
LYMPHOCYTES NFR BLD: 0.7 K/UL (ref 1–4.8)
LYMPHOCYTES RELATIVE PERCENT: 22 % (ref 24–44)
MCH RBC QN AUTO: 30.9 PG (ref 26–34)
MCHC RBC AUTO-ENTMCNC: 33.4 G/DL (ref 31–37)
MCV RBC AUTO: 92.5 FL (ref 80–100)
MONOCYTES NFR BLD: 0.4 K/UL (ref 0.1–1.2)
MONOCYTES NFR BLD: 11 % (ref 2–11)
NEUTROPHILS NFR BLD: 65 % (ref 36–66)
NEUTS SEG NFR BLD: 2.2 K/UL (ref 1.8–7.7)
PLATELET # BLD AUTO: 188 K/UL (ref 140–450)
PMV BLD AUTO: 6.7 FL (ref 6–12)
POTASSIUM SERPL-SCNC: 4.2 MMOL/L (ref 3.7–5.3)
PROT SERPL-MCNC: 6.9 G/DL (ref 6.6–8.7)
RBC # BLD AUTO: 4.37 M/UL (ref 4.5–5.9)
SODIUM SERPL-SCNC: 138 MMOL/L (ref 136–145)
WBC OTHER # BLD: 3.3 K/UL (ref 3.5–11)

## 2025-05-16 PROCEDURE — 96417 CHEMO IV INFUS EACH ADDL SEQ: CPT

## 2025-05-16 PROCEDURE — 77386 HC NTSTY MODUL RAD TX DLVR CPLX: CPT | Performed by: RADIOLOGY

## 2025-05-16 PROCEDURE — 96375 TX/PRO/DX INJ NEW DRUG ADDON: CPT

## 2025-05-16 PROCEDURE — 6360000002 HC RX W HCPCS: Performed by: INTERNAL MEDICINE

## 2025-05-16 PROCEDURE — 80053 COMPREHEN METABOLIC PANEL: CPT

## 2025-05-16 PROCEDURE — 85025 COMPLETE CBC W/AUTO DIFF WBC: CPT

## 2025-05-16 PROCEDURE — 96413 CHEMO IV INFUSION 1 HR: CPT

## 2025-05-16 PROCEDURE — 96374 THER/PROPH/DIAG INJ IV PUSH: CPT

## 2025-05-16 PROCEDURE — 2500000003 HC RX 250 WO HCPCS: Performed by: INTERNAL MEDICINE

## 2025-05-16 PROCEDURE — 2580000003 HC RX 258: Performed by: INTERNAL MEDICINE

## 2025-05-16 RX ORDER — ONDANSETRON 2 MG/ML
8 INJECTION INTRAMUSCULAR; INTRAVENOUS
Status: CANCELLED | OUTPATIENT
Start: 2025-05-16

## 2025-05-16 RX ORDER — HYDROCORTISONE SODIUM SUCCINATE 100 MG/2ML
100 INJECTION INTRAMUSCULAR; INTRAVENOUS
Status: CANCELLED | OUTPATIENT
Start: 2025-05-16

## 2025-05-16 RX ORDER — DIPHENHYDRAMINE HYDROCHLORIDE 50 MG/ML
50 INJECTION, SOLUTION INTRAMUSCULAR; INTRAVENOUS
Status: CANCELLED | OUTPATIENT
Start: 2025-05-16

## 2025-05-16 RX ORDER — ACETAMINOPHEN 325 MG/1
650 TABLET ORAL
Status: CANCELLED | OUTPATIENT
Start: 2025-05-16

## 2025-05-16 RX ORDER — SODIUM CHLORIDE 0.9 % (FLUSH) 0.9 %
5-40 SYRINGE (ML) INJECTION PRN
Status: CANCELLED | OUTPATIENT
Start: 2025-05-16

## 2025-05-16 RX ORDER — PALONOSETRON 0.05 MG/ML
0.25 INJECTION, SOLUTION INTRAVENOUS ONCE
Status: CANCELLED | OUTPATIENT
Start: 2025-05-16 | End: 2025-05-16

## 2025-05-16 RX ORDER — DIPHENHYDRAMINE HYDROCHLORIDE 50 MG/ML
50 INJECTION, SOLUTION INTRAMUSCULAR; INTRAVENOUS ONCE
Status: CANCELLED | OUTPATIENT
Start: 2025-05-16 | End: 2025-05-16

## 2025-05-16 RX ORDER — PALONOSETRON 0.05 MG/ML
0.25 INJECTION, SOLUTION INTRAVENOUS ONCE
OUTPATIENT
Start: 2025-05-23 | End: 2025-05-23

## 2025-05-16 RX ORDER — FAMOTIDINE 10 MG/ML
20 INJECTION, SOLUTION INTRAVENOUS ONCE
Status: COMPLETED | OUTPATIENT
Start: 2025-05-16 | End: 2025-05-16

## 2025-05-16 RX ORDER — SODIUM CHLORIDE 9 MG/ML
5-250 INJECTION, SOLUTION INTRAVENOUS PRN
OUTPATIENT
Start: 2025-05-23

## 2025-05-16 RX ORDER — SODIUM CHLORIDE 0.9 % (FLUSH) 0.9 %
5-40 SYRINGE (ML) INJECTION PRN
OUTPATIENT
Start: 2025-05-16

## 2025-05-16 RX ORDER — HEPARIN 100 UNIT/ML
500 SYRINGE INTRAVENOUS PRN
OUTPATIENT
Start: 2025-05-16

## 2025-05-16 RX ORDER — DEXAMETHASONE SODIUM PHOSPHATE 10 MG/ML
10 INJECTION, SOLUTION INTRAMUSCULAR; INTRAVENOUS ONCE
Status: COMPLETED | OUTPATIENT
Start: 2025-05-16 | End: 2025-05-16

## 2025-05-16 RX ORDER — SODIUM CHLORIDE 9 MG/ML
INJECTION, SOLUTION INTRAVENOUS CONTINUOUS
OUTPATIENT
Start: 2025-05-23

## 2025-05-16 RX ORDER — HYDROCORTISONE SODIUM SUCCINATE 100 MG/2ML
100 INJECTION INTRAMUSCULAR; INTRAVENOUS
OUTPATIENT
Start: 2025-05-16

## 2025-05-16 RX ORDER — DIPHENHYDRAMINE HYDROCHLORIDE 50 MG/ML
50 INJECTION, SOLUTION INTRAMUSCULAR; INTRAVENOUS ONCE
OUTPATIENT
Start: 2025-05-23 | End: 2025-05-23

## 2025-05-16 RX ORDER — ACETAMINOPHEN 325 MG/1
650 TABLET ORAL
OUTPATIENT
Start: 2025-05-23

## 2025-05-16 RX ORDER — HYDROCORTISONE SODIUM SUCCINATE 100 MG/2ML
100 INJECTION INTRAMUSCULAR; INTRAVENOUS
OUTPATIENT
Start: 2025-05-23

## 2025-05-16 RX ORDER — HEPARIN 100 UNIT/ML
500 SYRINGE INTRAVENOUS PRN
Status: DISCONTINUED | OUTPATIENT
Start: 2025-05-16 | End: 2025-05-17 | Stop reason: HOSPADM

## 2025-05-16 RX ORDER — HEPARIN SODIUM (PORCINE) LOCK FLUSH IV SOLN 100 UNIT/ML 100 UNIT/ML
500 SOLUTION INTRAVENOUS PRN
OUTPATIENT
Start: 2025-05-23

## 2025-05-16 RX ORDER — SODIUM CHLORIDE 9 MG/ML
25 INJECTION, SOLUTION INTRAVENOUS PRN
OUTPATIENT
Start: 2025-05-16

## 2025-05-16 RX ORDER — DIPHENHYDRAMINE HYDROCHLORIDE 50 MG/ML
50 INJECTION, SOLUTION INTRAMUSCULAR; INTRAVENOUS
OUTPATIENT
Start: 2025-05-16

## 2025-05-16 RX ORDER — HEPARIN SODIUM (PORCINE) LOCK FLUSH IV SOLN 100 UNIT/ML 100 UNIT/ML
500 SOLUTION INTRAVENOUS PRN
Status: CANCELLED | OUTPATIENT
Start: 2025-05-16

## 2025-05-16 RX ORDER — SODIUM CHLORIDE 0.9 % (FLUSH) 0.9 %
5-40 SYRINGE (ML) INJECTION PRN
OUTPATIENT
Start: 2025-05-23

## 2025-05-16 RX ORDER — EPINEPHRINE 1 MG/ML
0.3 INJECTION, SOLUTION, CONCENTRATE INTRAVENOUS PRN
Status: CANCELLED | OUTPATIENT
Start: 2025-05-16

## 2025-05-16 RX ORDER — MEPERIDINE HYDROCHLORIDE 50 MG/ML
12.5 INJECTION INTRAMUSCULAR; INTRAVENOUS; SUBCUTANEOUS PRN
Status: CANCELLED | OUTPATIENT
Start: 2025-05-16

## 2025-05-16 RX ORDER — ALBUTEROL SULFATE 90 UG/1
4 INHALANT RESPIRATORY (INHALATION) PRN
Status: CANCELLED | OUTPATIENT
Start: 2025-05-16

## 2025-05-16 RX ORDER — ONDANSETRON 2 MG/ML
8 INJECTION INTRAMUSCULAR; INTRAVENOUS
OUTPATIENT
Start: 2025-05-23

## 2025-05-16 RX ORDER — FAMOTIDINE 10 MG/ML
20 INJECTION, SOLUTION INTRAVENOUS ONCE
Status: CANCELLED | OUTPATIENT
Start: 2025-05-16 | End: 2025-05-16

## 2025-05-16 RX ORDER — PALONOSETRON 0.05 MG/ML
0.25 INJECTION, SOLUTION INTRAVENOUS ONCE
Status: COMPLETED | OUTPATIENT
Start: 2025-05-16 | End: 2025-05-16

## 2025-05-16 RX ORDER — SODIUM CHLORIDE 9 MG/ML
5-250 INJECTION, SOLUTION INTRAVENOUS PRN
Status: CANCELLED | OUTPATIENT
Start: 2025-05-16

## 2025-05-16 RX ORDER — FAMOTIDINE 10 MG/ML
20 INJECTION, SOLUTION INTRAVENOUS
Status: CANCELLED | OUTPATIENT
Start: 2025-05-16

## 2025-05-16 RX ORDER — EPINEPHRINE 1 MG/ML
0.3 INJECTION, SOLUTION, CONCENTRATE INTRAVENOUS PRN
OUTPATIENT
Start: 2025-05-23

## 2025-05-16 RX ORDER — ACETAMINOPHEN 325 MG/1
650 TABLET ORAL
OUTPATIENT
Start: 2025-05-16

## 2025-05-16 RX ORDER — FAMOTIDINE 10 MG/ML
20 INJECTION, SOLUTION INTRAVENOUS
OUTPATIENT
Start: 2025-05-23

## 2025-05-16 RX ORDER — FAMOTIDINE 10 MG/ML
20 INJECTION, SOLUTION INTRAVENOUS ONCE
OUTPATIENT
Start: 2025-05-23 | End: 2025-05-23

## 2025-05-16 RX ORDER — FAMOTIDINE 10 MG/ML
20 INJECTION, SOLUTION INTRAVENOUS
OUTPATIENT
Start: 2025-05-16

## 2025-05-16 RX ORDER — SODIUM CHLORIDE 9 MG/ML
5-250 INJECTION, SOLUTION INTRAVENOUS PRN
Status: DISCONTINUED | OUTPATIENT
Start: 2025-05-16 | End: 2025-05-17 | Stop reason: HOSPADM

## 2025-05-16 RX ORDER — DIPHENHYDRAMINE HYDROCHLORIDE 50 MG/ML
50 INJECTION, SOLUTION INTRAMUSCULAR; INTRAVENOUS
OUTPATIENT
Start: 2025-05-23

## 2025-05-16 RX ORDER — ALBUTEROL SULFATE 90 UG/1
4 INHALANT RESPIRATORY (INHALATION) PRN
OUTPATIENT
Start: 2025-05-16

## 2025-05-16 RX ORDER — DIPHENHYDRAMINE HYDROCHLORIDE 50 MG/ML
50 INJECTION, SOLUTION INTRAMUSCULAR; INTRAVENOUS ONCE
Status: COMPLETED | OUTPATIENT
Start: 2025-05-16 | End: 2025-05-16

## 2025-05-16 RX ORDER — ALBUTEROL SULFATE 90 UG/1
4 INHALANT RESPIRATORY (INHALATION) PRN
OUTPATIENT
Start: 2025-05-23

## 2025-05-16 RX ORDER — SODIUM CHLORIDE 0.9 % (FLUSH) 0.9 %
5-40 SYRINGE (ML) INJECTION PRN
Status: DISCONTINUED | OUTPATIENT
Start: 2025-05-16 | End: 2025-05-17 | Stop reason: HOSPADM

## 2025-05-16 RX ORDER — EPINEPHRINE 1 MG/ML
0.3 INJECTION, SOLUTION, CONCENTRATE INTRAVENOUS PRN
OUTPATIENT
Start: 2025-05-16

## 2025-05-16 RX ORDER — ONDANSETRON 2 MG/ML
8 INJECTION INTRAMUSCULAR; INTRAVENOUS
OUTPATIENT
Start: 2025-05-16

## 2025-05-16 RX ORDER — MEPERIDINE HYDROCHLORIDE 50 MG/ML
12.5 INJECTION INTRAMUSCULAR; INTRAVENOUS; SUBCUTANEOUS PRN
OUTPATIENT
Start: 2025-05-23

## 2025-05-16 RX ORDER — SODIUM CHLORIDE 9 MG/ML
INJECTION, SOLUTION INTRAVENOUS CONTINUOUS
Status: CANCELLED | OUTPATIENT
Start: 2025-05-16

## 2025-05-16 RX ORDER — SODIUM CHLORIDE 9 MG/ML
INJECTION, SOLUTION INTRAVENOUS CONTINUOUS
OUTPATIENT
Start: 2025-05-16

## 2025-05-16 RX ADMIN — SODIUM CHLORIDE, PRESERVATIVE FREE 10 ML: 5 INJECTION INTRAVENOUS at 14:41

## 2025-05-16 RX ADMIN — SODIUM CHLORIDE, PRESERVATIVE FREE 10 ML: 5 INJECTION INTRAVENOUS at 10:16

## 2025-05-16 RX ADMIN — DIPHENHYDRAMINE HYDROCHLORIDE 50 MG: 50 INJECTION INTRAMUSCULAR; INTRAVENOUS at 12:19

## 2025-05-16 RX ADMIN — SODIUM CHLORIDE 150 ML/HR: 0.9 INJECTION, SOLUTION INTRAVENOUS at 12:18

## 2025-05-16 RX ADMIN — DEXAMETHASONE SODIUM PHOSPHATE 10 MG: 10 INJECTION, SOLUTION INTRAMUSCULAR; INTRAVENOUS at 12:19

## 2025-05-16 RX ADMIN — FAMOTIDINE 20 MG: 10 INJECTION, SOLUTION INTRAVENOUS at 12:19

## 2025-05-16 RX ADMIN — PALONOSETRON 0.25 MG: 0.05 INJECTION, SOLUTION INTRAVENOUS at 12:18

## 2025-05-16 RX ADMIN — CARBOPLATIN 200 MG: 10 INJECTION INTRAVENOUS at 14:05

## 2025-05-16 RX ADMIN — PACLITAXEL 96 MG: 6 INJECTION, SOLUTION INTRAVENOUS at 12:53

## 2025-05-16 RX ADMIN — HEPARIN 500 UNITS: 100 SYRINGE at 14:41

## 2025-05-16 NOTE — PROGRESS NOTES
_     Chief Complaint   Patient presents with    Follow-up    Chemotherapy       DIAGNOSIS:    Stage T2N1M0 right lung adenocarcinoma 2010  New diagnosis of right lower lobe lung squamous cell carcinoma February 2025.  Clinical stage T3 N0 M0    CURRENT THERAPY:    Status post resection followed by adjuvant chemotherapy in 2010..    Further workup and management of newly diagnosed right lung squamous cell carcinoma.    Plan for chemoradiation followed by immunotherapy treatment. Chemoradiation started 4/18/25.     BRIEF CASE HISTORY:        Mr. Vic Nunez is a very pleasant 65 y.o. male with history of multiple co morbidities as listed.  He is referred for further management of recently diagnosed lung cancer.  Patient had history of l right lung adenocarcinoma in 2010.  He was treated with resection followed by 4 cycles of adjuvant chemotherapy treatment.  He was in remission since then.  He was last seen in January 2017.  He was lost for follow-up since then.  Recently patient had complaints of back pain.  He had evaluation with imaging and incidentally found to have right lower lobe lung lesion.  He had evaluation by pulmonary and he had bronchoscopy and biopsy.  Results positive for squamous cell carcinoma.  Clinically patient does not have significant respiratory symptoms.  He has occasional cough and sputum.  No hemoptysis.  No chest pain.  No fever or infections.  No headaches.    Patient quit smoking in 2024.    INTERIM HISTORY:   Evaluated today for further management of recently diagnosed squamous cell carcinoma of the right lung. No symptoms at the present time. No cough. No hemoptysis.  Started on chemoradiation. Tolerated well so far. No side effects.         PAST MEDICAL HISTORY: has a past medical history of Arthritis, Cancer (HCC), Cataract, Chronic back pain, COPD (chronic obstructive pulmonary disease) (HCC),

## 2025-05-16 NOTE — TELEPHONE ENCOUNTER
Instructions   from Dr. Henri Oliveira MD    Proceed with chemotherapy as ordered after checking labs.   RV 1 week      RV 05/23/25 7548

## 2025-05-16 NOTE — PROGRESS NOTES
Pt here for C.5 D.1. taxol, carbo  Arrives ambulatory.  Denies any new complaints.  Labs drawn from port, results reviewed.  Pt was seen by Dr. Oliveira, order rec'd to proceed with tx.  Tx complete without incident.  Pt d/c'd in stable condition.  Returns 5/23/2025 for office visit and C6D1.

## 2025-05-16 NOTE — TELEPHONE ENCOUNTER
Writer spoke to patient, patient states he will call office back with a day and time he is able to come in once he gets his chemo schedule

## 2025-05-19 ENCOUNTER — HOSPITAL ENCOUNTER (OUTPATIENT)
Dept: RADIATION ONCOLOGY | Age: 65
Discharge: HOME | End: 2025-05-19
Payer: MEDICARE

## 2025-05-19 PROCEDURE — 77386 HC NTSTY MODUL RAD TX DLVR CPLX: CPT | Performed by: RADIOLOGY

## 2025-05-20 ENCOUNTER — SOCIAL WORK (OUTPATIENT)
Age: 65
End: 2025-05-20

## 2025-05-20 ENCOUNTER — HOSPITAL ENCOUNTER (OUTPATIENT)
Dept: RADIATION ONCOLOGY | Age: 65
Discharge: HOME | End: 2025-05-20
Payer: MEDICARE

## 2025-05-20 PROCEDURE — 77386 HC NTSTY MODUL RAD TX DLVR CPLX: CPT | Performed by: RADIOLOGY

## 2025-05-20 NOTE — PROGRESS NOTES
set up ride through insurance provider for 5/27-29 @ 2:15 pm and 5/30 @ 11 am to [facility].   Transportation details:  [place] [phone]   1 pm 5/19-22, pickup at 10 am 5/23  Confirmation # [id number], #[id number], #[id number], #[id number]  3 pm return ride 5/27-29, will call 5/30

## 2025-05-21 ENCOUNTER — HOSPITAL ENCOUNTER (OUTPATIENT)
Dept: RADIATION ONCOLOGY | Age: 65
Discharge: HOME | End: 2025-05-21
Payer: MEDICARE

## 2025-05-21 VITALS
RESPIRATION RATE: 16 BRPM | TEMPERATURE: 96.9 F | OXYGEN SATURATION: 99 % | DIASTOLIC BLOOD PRESSURE: 95 MMHG | HEART RATE: 78 BPM | WEIGHT: 201.4 LBS | BODY MASS INDEX: 27.31 KG/M2 | SYSTOLIC BLOOD PRESSURE: 132 MMHG

## 2025-05-21 PROCEDURE — 77386 HC NTSTY MODUL RAD TX DLVR CPLX: CPT | Performed by: RADIOLOGY

## 2025-05-21 RX ORDER — SUCRALFATE 1 G/1
1 TABLET ORAL 4 TIMES DAILY
Qty: 120 TABLET | Refills: 0 | Status: SHIPPED | OUTPATIENT
Start: 2025-05-21

## 2025-05-21 RX ORDER — LIDOCAINE HYDROCHLORIDE 20 MG/ML
15 SOLUTION OROPHARYNGEAL
Qty: 100 ML | Refills: 0 | Status: SHIPPED | OUTPATIENT
Start: 2025-05-21

## 2025-05-21 NOTE — PROGRESS NOTES
[facility]       Radiation Oncology          [address] [id number]        O: [phone]        F: [phone]       [facility]             RADIATION ONCOLOGY WEEKLY PROGRESS NOTE  Patient ID:   Vic Nunez Sr.  : 1960   MRN: [id number]    Location:  [facility] Radiation Oncology,   [address] [place] [address], [place] [address]   [phone]    DIAGNOSIS:  Squamous cell carcinoma of the right lower lobe T3 N0 M0     TREATMENT DETAILS:  Treatment Site: RLL  Actual Dose: 4600cGy  Total Planned Dose: 6000cGy  Treatment Technique: IMRT  Fraction Technique: Daily  Therapy imaging monitoring: CBCT daily  Concurrent Chemotherapy: Weekly CarboTaxol    SUBJECTIVE:   Patient seen for their weekly on treatment evaluation today.  Reports fatigue, and pain with swallowing, no changes in breathing    OBJECTIVE:     ECO Asymptomatic    VITAL SIGNS: BP (!) 132/95   Pulse 78   Temp 96.9 °F (36.1 °C) (Temporal)   Resp 16   Wt 91.4 kg (201 lb 6.4 oz)   SpO2 99%   BMI 27.31 kg/m²   Wt Readings from Last 5 Encounters:   25 91.4 kg (201 lb 6.4 oz)   25 91.7 kg (202 lb 1.6 oz)   05/15/25 90.5 kg (199 lb 9.6 oz)   25 92.5 kg (204 lb)   25 92 kg (202 lb 12.8 oz)     GENERAL:  General appearance is that of a well-nourished, well-developed in no apparent distress.  HEART:  Normal rate and regular rhythm  LUNGS:  Pulmonary effort normal.  ABDOMEN:  Soft, nontender, non distended  EXTREMITIES:  No edema.  No calf tenderness.  MSK:  No spinal tenderness. Normal ROM.  NEUROLOGICAL: Alert and oriented. Strength and sensation intact bilaterally. No focal deficits.   PSYCH: Mood normal, behavior normal.      LABS:  WBC   Date Value Ref Range Status   2025 3.3 (L) 3.5 - 11.0 k/uL Final   2025 3.8 3.5 - 11.0 k/uL Final   2025 4.2 3.5 - 11.0 k/uL Final     Neutrophils Absolute   Date Value Ref Range Status   2025

## 2025-05-21 NOTE — PROGRESS NOTES
Homatilda Nunez Sr.  5/21/2025  Wt Readings from Last 3 Encounters:   05/21/25 91.4 kg (201 lb 6.4 oz)   05/16/25 91.7 kg (202 lb 1.6 oz)   05/15/25 90.5 kg (199 lb 9.6 oz)     Body mass index is 27.31 kg/m².        Treatment Area:  Right Lung Cancer w/ Weekly Carbo/Taxol    Patient was seen today for weekly visit.      Comfort Alteration  Fatigue: None    Ventilation Alterations  Cough: Occasional  Hemoptysis: No  Mucus Color: N/A  Dyspnea: No      Nutritional Alteration-Feels like foods are \"getting stuck,\" when swallowing  Anorexia: No  Nausea: Occasional-antiemetics helpful  Vomiting: No     Mucous Membrane Alteration  Voice Changes/ Stridor/Larynx: no  Pharynx & Esophagus: wnl    Elimination Alterations  Constipation: no  Diarrhea:  no    Skin Alteration   Sensation: wnl    Radiation Dermatitis:  Intact [x]     Erythema  []     Discoloration  []     Rash []     Dry desquamation  []     Moist desquamation []       Emotional  Coping: effective      Injury, potential bleeding or infection: none    Lab Results   Component Value Date    WBC 3.3 (L) 05/16/2025    HGB 13.5 05/16/2025    HCT 40.5 (L) 05/16/2025     05/16/2025         BP (!) 132/95   Pulse 78   Temp 96.9 °F (36.1 °C) (Temporal)   Resp 16   Wt 91.4 kg (201 lb 6.4 oz)   SpO2 99%   BMI 27.31 kg/m²                   Assessment/Plan: Patient was seen today for weekly visit.  States feels like food is getting stuck when swallowing and denies pain with swallowing.  His weight is stable.  He states ongoing nausea that medication is effective for.  He states received stronger dose of pain medication for his back which is helpful.  Instructed on how to take viscous lidocaine and make a Carafate slurry.  Plan of care ongoing.    Jane Barrientos RN

## 2025-05-22 ENCOUNTER — TELEPHONE (OUTPATIENT)
Age: 65
End: 2025-05-22

## 2025-05-22 ENCOUNTER — HOSPITAL ENCOUNTER (OUTPATIENT)
Dept: RADIATION ONCOLOGY | Age: 65
Discharge: HOME | End: 2025-05-22
Payer: MEDICARE

## 2025-05-22 PROCEDURE — 77386 HC NTSTY MODUL RAD TX DLVR CPLX: CPT | Performed by: RADIOLOGY

## 2025-05-22 NOTE — TELEPHONE ENCOUNTER
Patient called stating no ride had shown yet.  called insurance provider who states  is ten minutes out. Patient updated.

## 2025-05-22 NOTE — TELEPHONE ENCOUNTER
Name: Vic Nunez .  : 1960  MRN: [id number]    Oncology Navigation Follow-Up Note    Contact Type:  Telephone    Notes: Navigator checking with pt. And pt. Doesn't think pain medication working. Pt. Continues to have pain in neck and down spine. Writer encouraging pt. To discuss with Dr. Rivers tomorrow.       Electronically signed by Jessica Moeller RN on 2025 at 11:30 AM

## 2025-05-23 ENCOUNTER — HOSPITAL ENCOUNTER (OUTPATIENT)
Dept: RADIATION ONCOLOGY | Age: 65
Discharge: HOME | End: 2025-05-23
Payer: MEDICARE

## 2025-05-23 ENCOUNTER — OFFICE VISIT (OUTPATIENT)
Age: 65
End: 2025-05-23
Payer: MEDICARE

## 2025-05-23 ENCOUNTER — TELEPHONE (OUTPATIENT)
Age: 65
End: 2025-05-23

## 2025-05-23 ENCOUNTER — HOSPITAL ENCOUNTER (OUTPATIENT)
Dept: INFUSION THERAPY | Age: 65
Discharge: HOME | End: 2025-05-23
Payer: MEDICARE

## 2025-05-23 VITALS
HEIGHT: 72 IN | DIASTOLIC BLOOD PRESSURE: 82 MMHG | RESPIRATION RATE: 16 BRPM | TEMPERATURE: 95.8 F | WEIGHT: 201.6 LBS | SYSTOLIC BLOOD PRESSURE: 127 MMHG | BODY MASS INDEX: 27.3 KG/M2 | HEART RATE: 71 BPM | OXYGEN SATURATION: 97 %

## 2025-05-23 DIAGNOSIS — C78.01 SQUAMOUS CELL CARCINOMA METASTATIC TO BRONCHUS OF RIGHT LOWER LOBE (HCC): Primary | ICD-10-CM

## 2025-05-23 DIAGNOSIS — C44.92 SQUAMOUS CELL CARCINOMA METASTATIC TO BRONCHUS OF RIGHT LOWER LOBE (HCC): Primary | ICD-10-CM

## 2025-05-23 LAB
ALBUMIN SERPL-MCNC: 4.1 G/DL (ref 3.5–5.2)
ALBUMIN/GLOB SERPL: 1.6 {RATIO} (ref 1–2.5)
ALP SERPL-CCNC: 72 U/L (ref 40–129)
ALT SERPL-CCNC: 41 U/L (ref 10–50)
ANION GAP SERPL CALCULATED.3IONS-SCNC: 12 MMOL/L (ref 9–16)
AST SERPL-CCNC: 35 U/L (ref 10–50)
BASOPHILS # BLD: 0 K/UL (ref 0–0.2)
BASOPHILS NFR BLD: 1 % (ref 0–2)
BILIRUB SERPL-MCNC: 0.6 MG/DL (ref 0–1.2)
BUN SERPL-MCNC: 16 MG/DL (ref 8–23)
CALCIUM SERPL-MCNC: 9.3 MG/DL (ref 8.6–10.4)
CHLORIDE SERPL-SCNC: 101 MMOL/L (ref 98–107)
CO2 SERPL-SCNC: 25 MMOL/L (ref 20–31)
CREAT SERPL-MCNC: 1.1 MG/DL (ref 0.7–1.2)
EOSINOPHIL # BLD: 0 K/UL (ref 0–0.4)
EOSINOPHILS RELATIVE PERCENT: 1 % (ref 1–4)
ERYTHROCYTE [DISTWIDTH] IN BLOOD BY AUTOMATED COUNT: 14.5 % (ref 12.5–15.4)
GFR, ESTIMATED: 74 ML/MIN/1.73M2
GLUCOSE SERPL-MCNC: 110 MG/DL (ref 74–99)
HCT VFR BLD AUTO: 38.6 % (ref 41–53)
HGB BLD-MCNC: 12.8 G/DL (ref 13.5–17.5)
LYMPHOCYTES NFR BLD: 0.6 K/UL (ref 1–4.8)
LYMPHOCYTES RELATIVE PERCENT: 14 % (ref 24–44)
MCH RBC QN AUTO: 30.5 PG (ref 26–34)
MCHC RBC AUTO-ENTMCNC: 33 G/DL (ref 31–37)
MCV RBC AUTO: 92.5 FL (ref 80–100)
MONOCYTES NFR BLD: 0.5 K/UL (ref 0.1–1.2)
MONOCYTES NFR BLD: 11 % (ref 2–11)
NEUTROPHILS NFR BLD: 73 % (ref 36–66)
NEUTS SEG NFR BLD: 3.1 K/UL (ref 1.8–7.7)
PLATELET # BLD AUTO: 181 K/UL (ref 140–450)
PMV BLD AUTO: 6.6 FL (ref 6–12)
POTASSIUM SERPL-SCNC: 4 MMOL/L (ref 3.7–5.3)
PROT SERPL-MCNC: 6.7 G/DL (ref 6.6–8.7)
RBC # BLD AUTO: 4.18 M/UL (ref 4.5–5.9)
SODIUM SERPL-SCNC: 138 MMOL/L (ref 136–145)
WBC OTHER # BLD: 4.2 K/UL (ref 3.5–11)

## 2025-05-23 PROCEDURE — 1036F TOBACCO NON-USER: CPT | Performed by: INTERNAL MEDICINE

## 2025-05-23 PROCEDURE — G8427 DOCREV CUR MEDS BY ELIG CLIN: HCPCS | Performed by: INTERNAL MEDICINE

## 2025-05-23 PROCEDURE — 96417 CHEMO IV INFUS EACH ADDL SEQ: CPT

## 2025-05-23 PROCEDURE — 99214 OFFICE O/P EST MOD 30 MIN: CPT | Performed by: INTERNAL MEDICINE

## 2025-05-23 PROCEDURE — G8417 CALC BMI ABV UP PARAM F/U: HCPCS | Performed by: INTERNAL MEDICINE

## 2025-05-23 PROCEDURE — 3017F COLORECTAL CA SCREEN DOC REV: CPT | Performed by: INTERNAL MEDICINE

## 2025-05-23 PROCEDURE — 1125F AMNT PAIN NOTED PAIN PRSNT: CPT | Performed by: INTERNAL MEDICINE

## 2025-05-23 PROCEDURE — 2580000003 HC RX 258: Performed by: INTERNAL MEDICINE

## 2025-05-23 PROCEDURE — 2500000003 HC RX 250 WO HCPCS: Performed by: INTERNAL MEDICINE

## 2025-05-23 PROCEDURE — 96375 TX/PRO/DX INJ NEW DRUG ADDON: CPT

## 2025-05-23 PROCEDURE — 1159F MED LIST DOCD IN RCRD: CPT | Performed by: INTERNAL MEDICINE

## 2025-05-23 PROCEDURE — 77386 HC NTSTY MODUL RAD TX DLVR CPLX: CPT | Performed by: RADIOLOGY

## 2025-05-23 PROCEDURE — 6360000002 HC RX W HCPCS: Performed by: INTERNAL MEDICINE

## 2025-05-23 PROCEDURE — 96413 CHEMO IV INFUSION 1 HR: CPT

## 2025-05-23 PROCEDURE — 1123F ACP DISCUSS/DSCN MKR DOCD: CPT | Performed by: INTERNAL MEDICINE

## 2025-05-23 PROCEDURE — 85025 COMPLETE CBC W/AUTO DIFF WBC: CPT

## 2025-05-23 PROCEDURE — 80053 COMPREHEN METABOLIC PANEL: CPT

## 2025-05-23 RX ORDER — MEPERIDINE HYDROCHLORIDE 50 MG/ML
12.5 INJECTION INTRAMUSCULAR; INTRAVENOUS; SUBCUTANEOUS PRN
OUTPATIENT
Start: 2025-05-30

## 2025-05-23 RX ORDER — SODIUM CHLORIDE 9 MG/ML
INJECTION, SOLUTION INTRAVENOUS CONTINUOUS
OUTPATIENT
Start: 2025-05-30

## 2025-05-23 RX ORDER — DIPHENHYDRAMINE HYDROCHLORIDE 50 MG/ML
50 INJECTION, SOLUTION INTRAMUSCULAR; INTRAVENOUS ONCE
Status: COMPLETED | OUTPATIENT
Start: 2025-05-23 | End: 2025-05-23

## 2025-05-23 RX ORDER — PALONOSETRON 0.05 MG/ML
0.25 INJECTION, SOLUTION INTRAVENOUS ONCE
Status: COMPLETED | OUTPATIENT
Start: 2025-05-23 | End: 2025-05-23

## 2025-05-23 RX ORDER — DEXAMETHASONE SODIUM PHOSPHATE 10 MG/ML
10 INJECTION, SOLUTION INTRAMUSCULAR; INTRAVENOUS ONCE
Status: COMPLETED | OUTPATIENT
Start: 2025-05-23 | End: 2025-05-23

## 2025-05-23 RX ORDER — SODIUM CHLORIDE 9 MG/ML
5-250 INJECTION, SOLUTION INTRAVENOUS PRN
OUTPATIENT
Start: 2025-05-30

## 2025-05-23 RX ORDER — SODIUM CHLORIDE 0.9 % (FLUSH) 0.9 %
5-40 SYRINGE (ML) INJECTION PRN
OUTPATIENT
Start: 2025-05-30

## 2025-05-23 RX ORDER — ONDANSETRON 2 MG/ML
8 INJECTION INTRAMUSCULAR; INTRAVENOUS
OUTPATIENT
Start: 2025-05-30

## 2025-05-23 RX ORDER — SODIUM CHLORIDE 0.9 % (FLUSH) 0.9 %
5-40 SYRINGE (ML) INJECTION PRN
Status: DISCONTINUED | OUTPATIENT
Start: 2025-05-23 | End: 2025-05-24 | Stop reason: HOSPADM

## 2025-05-23 RX ORDER — ALBUTEROL SULFATE 90 UG/1
4 INHALANT RESPIRATORY (INHALATION) PRN
OUTPATIENT
Start: 2025-05-30

## 2025-05-23 RX ORDER — EPINEPHRINE 1 MG/ML
0.3 INJECTION, SOLUTION, CONCENTRATE INTRAVENOUS PRN
OUTPATIENT
Start: 2025-05-30

## 2025-05-23 RX ORDER — DIPHENHYDRAMINE HYDROCHLORIDE 50 MG/ML
50 INJECTION, SOLUTION INTRAMUSCULAR; INTRAVENOUS
OUTPATIENT
Start: 2025-05-30

## 2025-05-23 RX ORDER — PALONOSETRON 0.05 MG/ML
0.25 INJECTION, SOLUTION INTRAVENOUS ONCE
OUTPATIENT
Start: 2025-05-30 | End: 2025-05-30

## 2025-05-23 RX ORDER — SODIUM CHLORIDE 9 MG/ML
5-250 INJECTION, SOLUTION INTRAVENOUS PRN
Status: DISCONTINUED | OUTPATIENT
Start: 2025-05-23 | End: 2025-05-24 | Stop reason: HOSPADM

## 2025-05-23 RX ORDER — DIPHENHYDRAMINE HYDROCHLORIDE 50 MG/ML
50 INJECTION, SOLUTION INTRAMUSCULAR; INTRAVENOUS ONCE
OUTPATIENT
Start: 2025-05-30 | End: 2025-05-30

## 2025-05-23 RX ORDER — HEPARIN 100 UNIT/ML
500 SYRINGE INTRAVENOUS PRN
Status: DISCONTINUED | OUTPATIENT
Start: 2025-05-23 | End: 2025-05-24 | Stop reason: HOSPADM

## 2025-05-23 RX ORDER — FAMOTIDINE 10 MG/ML
20 INJECTION, SOLUTION INTRAVENOUS ONCE
OUTPATIENT
Start: 2025-05-30 | End: 2025-05-30

## 2025-05-23 RX ORDER — HEPARIN SODIUM (PORCINE) LOCK FLUSH IV SOLN 100 UNIT/ML 100 UNIT/ML
500 SOLUTION INTRAVENOUS PRN
OUTPATIENT
Start: 2025-05-30

## 2025-05-23 RX ORDER — FAMOTIDINE 10 MG/ML
20 INJECTION, SOLUTION INTRAVENOUS ONCE
Status: COMPLETED | OUTPATIENT
Start: 2025-05-23 | End: 2025-05-23

## 2025-05-23 RX ORDER — HYDROCORTISONE SODIUM SUCCINATE 100 MG/2ML
100 INJECTION INTRAMUSCULAR; INTRAVENOUS
OUTPATIENT
Start: 2025-05-30

## 2025-05-23 RX ORDER — FAMOTIDINE 10 MG/ML
20 INJECTION, SOLUTION INTRAVENOUS
OUTPATIENT
Start: 2025-05-30

## 2025-05-23 RX ORDER — ACETAMINOPHEN 325 MG/1
650 TABLET ORAL
OUTPATIENT
Start: 2025-05-30

## 2025-05-23 RX ADMIN — SODIUM CHLORIDE 150 ML/HR: 0.9 INJECTION, SOLUTION INTRAVENOUS at 11:08

## 2025-05-23 RX ADMIN — PACLITAXEL 96 MG: 6 INJECTION, SOLUTION INTRAVENOUS at 12:46

## 2025-05-23 RX ADMIN — DEXAMETHASONE SODIUM PHOSPHATE 10 MG: 10 INJECTION, SOLUTION INTRAMUSCULAR; INTRAVENOUS at 11:12

## 2025-05-23 RX ADMIN — DIPHENHYDRAMINE HYDROCHLORIDE 50 MG: 50 INJECTION INTRAMUSCULAR; INTRAVENOUS at 11:09

## 2025-05-23 RX ADMIN — PALONOSETRON 0.25 MG: 0.05 INJECTION, SOLUTION INTRAVENOUS at 11:08

## 2025-05-23 RX ADMIN — HEPARIN 500 UNITS: 100 SYRINGE at 14:23

## 2025-05-23 RX ADMIN — SODIUM CHLORIDE, PRESERVATIVE FREE 10 ML: 5 INJECTION INTRAVENOUS at 10:37

## 2025-05-23 RX ADMIN — CARBOPLATIN 200 MG: 10 INJECTION, SOLUTION INTRAVENOUS at 13:50

## 2025-05-23 RX ADMIN — SODIUM CHLORIDE, PRESERVATIVE FREE 10 ML: 5 INJECTION INTRAVENOUS at 14:23

## 2025-05-23 RX ADMIN — FAMOTIDINE 20 MG: 10 INJECTION, SOLUTION INTRAVENOUS at 11:10

## 2025-05-23 NOTE — PROGRESS NOTES
Pt here for C6D1 Taxol and Carbo. Pt seen by Dr Rivers prior to tx, refer to his note. Labs drawn from port and results reviewed. Pt was treated without incident and d/c'd in stable condition. Pt will return on 5-30-25 for MD f/u and C7D1.

## 2025-05-23 NOTE — PROGRESS NOTES
_     Chief Complaint   Patient presents with    Follow-up     Review disease status       DIAGNOSIS:    Stage T2N1M0 right lung adenocarcinoma 2010  New diagnosis of right lower lobe lung squamous cell carcinoma February 2025.  Clinical stage T3 N0 M0    CURRENT THERAPY:    Status post resection followed by adjuvant chemotherapy in 2010..    Further workup and management of newly diagnosed right lung squamous cell carcinoma.    Plan for chemoradiation followed by immunotherapy treatment. Chemoradiation started 4/18/25.     BRIEF CASE HISTORY:        Mr. Vic Nunez is a very pleasant 65 y.o. male with history of multiple co morbidities as listed.  He is referred for further management of recently diagnosed lung cancer.  Patient had history of l right lung adenocarcinoma in 2010.  He was treated with resection followed by 4 cycles of adjuvant chemotherapy treatment.  He was in remission since then.  He was last seen in January 2017.  He was lost for follow-up since then.  Recently patient had complaints of back pain.  He had evaluation with imaging and incidentally found to have right lower lobe lung lesion.  He had evaluation by pulmonary and he had bronchoscopy and biopsy.  Results positive for squamous cell carcinoma.  Clinically patient does not have significant respiratory symptoms.  He has occasional cough and sputum.  No hemoptysis.  No chest pain.  No fever or infections.  No headaches.    Patient quit smoking in 2024.    INTERIM HISTORY:   Evaluated today for further management of recently diagnosed squamous cell carcinoma of the right lung. No symptoms at the present time. No cough. No hemoptysis.  Started on chemoradiation. Tolerated well so far. No side effects.         PAST MEDICAL HISTORY: has a past medical history of Arthritis, Cancer (HCC), Cataract, Chronic back pain, COPD (chronic obstructive pulmonary

## 2025-05-23 NOTE — TELEPHONE ENCOUNTER
Instructions   from Dr. Henri Oliveira MD    Proceed with chemotherapy as ordered after checking labs.   RV 1 week      Tx today  RV 5/30/25 at 9:30 am with tx to follow.

## 2025-05-27 ENCOUNTER — TELEPHONE (OUTPATIENT)
Dept: RADIATION ONCOLOGY | Age: 65
End: 2025-05-27

## 2025-05-27 ENCOUNTER — TELEPHONE (OUTPATIENT)
Age: 65
End: 2025-05-27

## 2025-05-27 ENCOUNTER — SOCIAL WORK (OUTPATIENT)
Age: 65
End: 2025-05-27

## 2025-05-27 ENCOUNTER — HOSPITAL ENCOUNTER (OUTPATIENT)
Dept: RADIATION ONCOLOGY | Age: 65
End: 2025-05-27
Payer: MEDICARE

## 2025-05-27 DIAGNOSIS — C34.12 MALIGNANT NEOPLASM OF UPPER LOBE OF LEFT LUNG (HCC): Primary | ICD-10-CM

## 2025-05-27 RX ORDER — BENZONATATE 100 MG/1
100 CAPSULE ORAL 3 TIMES DAILY PRN
Qty: 30 CAPSULE | Refills: 1 | Status: SHIPPED | OUTPATIENT
Start: 2025-05-27

## 2025-05-27 NOTE — TELEPHONE ENCOUNTER
Patient called stating not feeling well and that he would not be in today.  cancelled ride and updated radiation.

## 2025-05-27 NOTE — TELEPHONE ENCOUNTER
Writer received update that pt is not coming to radiation treatment due to not feeling well.      Called pt who states he has unrelieved and non-productive cough.  Denies new shortness of breath though he sounds tight/wheezy and having coughing spasms during conversation.  He denies fever and states hot and cold on and off.  He states he has been using his Symbicort and Albuterol inhaler.  Taking Nyquil for cough.    Reviewed with Dr. Springer who ordered chest xray and is prescribing Tessalon capsules.  Patient notified and instructed to go to ER if symptoms worsen.  Patient verbalized understanding.

## 2025-05-27 NOTE — PROGRESS NOTES
set up ride through insurance provider for 6/2-3 @ 2:15 pm to [facility].   Transportation details:  [place] [phone]   1 pm 6/2-3  Confirmation #[id number], #[id number]  3 pm return ride 6/2-3

## 2025-05-28 ENCOUNTER — APPOINTMENT (OUTPATIENT)
Dept: RADIATION ONCOLOGY | Age: 65
End: 2025-05-28
Payer: MEDICARE

## 2025-05-28 ENCOUNTER — TELEPHONE (OUTPATIENT)
Dept: RADIATION ONCOLOGY | Age: 65
End: 2025-05-28

## 2025-05-28 NOTE — TELEPHONE ENCOUNTER
Patient did not show for radiation treatment today.  Called pt and he states he is feeling better but is not coming in for his treatment today.  He states the Tessalon capsules have helped.  He is agreeable to return to radiation treatments tomorrow.

## 2025-05-29 ENCOUNTER — HOSPITAL ENCOUNTER (OUTPATIENT)
Dept: GENERAL RADIOLOGY | Age: 65
Discharge: HOME | End: 2025-05-31
Payer: MEDICARE

## 2025-05-29 ENCOUNTER — HOSPITAL ENCOUNTER (OUTPATIENT)
Dept: RADIATION ONCOLOGY | Age: 65
Discharge: HOME | End: 2025-05-29
Payer: MEDICARE

## 2025-05-29 ENCOUNTER — HOSPITAL ENCOUNTER (OUTPATIENT)
Age: 65
Discharge: HOME | End: 2025-05-31
Payer: MEDICARE

## 2025-05-29 VITALS
TEMPERATURE: 98.5 F | WEIGHT: 201 LBS | SYSTOLIC BLOOD PRESSURE: 114 MMHG | HEART RATE: 72 BPM | BODY MASS INDEX: 27.26 KG/M2 | RESPIRATION RATE: 16 BRPM | OXYGEN SATURATION: 98 % | DIASTOLIC BLOOD PRESSURE: 67 MMHG

## 2025-05-29 DIAGNOSIS — C78.01 SQUAMOUS CELL CARCINOMA METASTATIC TO BRONCHUS OF RIGHT LOWER LOBE (HCC): ICD-10-CM

## 2025-05-29 DIAGNOSIS — C78.01 SQUAMOUS CELL CARCINOMA METASTATIC TO BRONCHUS OF RIGHT LOWER LOBE (HCC): Primary | ICD-10-CM

## 2025-05-29 DIAGNOSIS — C44.92 SQUAMOUS CELL CARCINOMA METASTATIC TO BRONCHUS OF RIGHT LOWER LOBE (HCC): ICD-10-CM

## 2025-05-29 DIAGNOSIS — C44.92 SQUAMOUS CELL CARCINOMA METASTATIC TO BRONCHUS OF RIGHT LOWER LOBE (HCC): Primary | ICD-10-CM

## 2025-05-29 PROCEDURE — 71046 X-RAY EXAM CHEST 2 VIEWS: CPT

## 2025-05-29 PROCEDURE — 77386 HC NTSTY MODUL RAD TX DLVR CPLX: CPT | Performed by: RADIOLOGY

## 2025-05-29 RX ORDER — GUAIFENESIN/DEXTROMETHORPHAN 100-10MG/5
5 SYRUP ORAL 3 TIMES DAILY PRN
Qty: 120 ML | Refills: 0 | Status: SHIPPED | OUTPATIENT
Start: 2025-05-29 | End: 2025-06-08

## 2025-05-29 RX ORDER — AZITHROMYCIN 250 MG/1
TABLET, FILM COATED ORAL
Qty: 6 TABLET | Refills: 0 | Status: SHIPPED | OUTPATIENT
Start: 2025-05-29 | End: 2025-06-08

## 2025-05-29 RX ORDER — METHYLPREDNISOLONE 4 MG/1
TABLET ORAL
Qty: 5 TABLET | Refills: 0 | Status: SHIPPED | OUTPATIENT
Start: 2025-05-29 | End: 2025-06-04

## 2025-05-29 ASSESSMENT — PAIN SCALES - GENERAL: PAINLEVEL_OUTOF10: 4

## 2025-05-29 ASSESSMENT — PAIN DESCRIPTION - LOCATION: LOCATION: CHEST

## 2025-05-29 NOTE — PROGRESS NOTES
[facility]       Radiation Oncology          [address] [place] [address]        O: [phone]        F: [phone]       [facility]             RADIATION ONCOLOGY WEEKLY PROGRESS NOTE  Patient ID:   Vic Nunez Sr.  : 1960   MRN: [id number]    Location:  [facility] Radiation Oncology,   [address] [place] [address]., [place] [address]   [phone]    DIAGNOSIS:  Squamous cell carcinoma of the right lower lobe T3 N0 M0     TREATMENT DETAILS:  Treatment Site: RLL  Actual Dose: 5200cGy  Total Planned Dose: 6000cGy  Treatment Technique: IMRT  Fraction Technique: Daily  Therapy imaging monitoring: CBCT daily  Concurrent Chemotherapy: Weekly CarboTaxol    SUBJECTIVE:   Patient seen for their weekly on treatment evaluation today.  Patient reports cough, chest pain, generalized fatigue.  Denies fever, chills, vital signs are stable.    OBJECTIVE:     ECO Asymptomatic    VITAL SIGNS: /67   Pulse 72   Temp 98.5 °F (36.9 °C) (Temporal)   Resp 16   Wt 91.2 kg (201 lb)   SpO2 98%   BMI 27.26 kg/m²   Wt Readings from Last 5 Encounters:   25 91.2 kg (201 lb)   25 91.4 kg (201 lb 9.6 oz)   25 91.4 kg (201 lb 6.4 oz)   25 91.7 kg (202 lb 1.6 oz)   05/15/25 90.5 kg (199 lb 9.6 oz)     GENERAL:  General appearance is that of a well-nourished, well-developed in no apparent distress.  HEART:  Normal rate and regular rhythm  LUNGS:  Pulmonary effort normal.  ABDOMEN:  Soft, nontender, non distended  EXTREMITIES:  No edema.  No calf tenderness.  MSK:  No spinal tenderness. Normal ROM.  NEUROLOGICAL: Alert and oriented. Strength and sensation intact bilaterally. No focal deficits.   PSYCH: Mood normal, behavior normal.      LABS:  WBC   Date Value Ref Range Status   2025 4.2 3.5 - 11.0 k/uL Final   2025 3.3 (L) 3.5 - 11.0 k/uL Final   2025 3.8 3.5 - 11.0 k/uL Final     Neutrophils Absolute   Date Value Ref

## 2025-05-29 NOTE — PROGRESS NOTES
Vic Nunez Sr.  5/29/2025  Wt Readings from Last 3 Encounters:   05/29/25 91.2 kg (201 lb)   05/23/25 91.4 kg (201 lb 9.6 oz)   05/21/25 91.4 kg (201 lb 6.4 oz)     Body mass index is 27.26 kg/m².        Treatment Area:  Right Lung Cancer w/ Weekly Carbo/Taxol    Patient was seen today for weekly visit.      Comfort Alteration  Fatigue: None    Ventilation Alterations  Cough: Yes  Hemoptysis: No  Mucus Color: N/A  Dyspnea: No      Nutritional Alteration-Feels like foods are \"getting stuck,\" when swallowing  Anorexia: No  Nausea: Occasional-antiemetics helpful  Vomiting: No     Mucous Membrane Alteration  Voice Changes/ Stridor/Larynx: no  Pharynx & Esophagus: wnl    Elimination Alterations  Constipation: no  Diarrhea:  no    Skin Alteration   Sensation: wnl    Radiation Dermatitis:  Intact [x]     Erythema  []     Discoloration  []     Rash []     Dry desquamation  []     Moist desquamation []       Emotional  Coping: effective      Injury, potential bleeding or infection: none    Lab Results   Component Value Date    WBC 4.2 05/23/2025    HGB 12.8 (L) 05/23/2025    HCT 38.6 (L) 05/23/2025     05/23/2025         /67   Pulse 72   Temp 98.5 °F (36.9 °C) (Temporal)   Resp 16   Wt 91.2 kg (201 lb)   SpO2 98%   BMI 27.26 kg/m²      Pain Assessment: 0-10  Pain Level: 4         Assessment/Plan: Patient was seen today for weekly visit.  He arrives ambulatory with steady gait.  Patient reports going to ED with cold symptoms last Friday but did not stay due to volume of patients waiting to be seen in department.  He missed radiation therapy the past two days. He resumed radiation therapy today.  He continues to have frequent, dry cough and body aches.  Dr. Junior evaluated patient.  Patient to have chest x-ray today.  Prescriptions for Medrol dose pack, zithromax and robitussin sent to patient pharmacy. Patient instructed to start medications as soon as possible.  He voices understanding. Patient

## 2025-05-30 ENCOUNTER — HOSPITAL ENCOUNTER (OUTPATIENT)
Dept: INFUSION THERAPY | Age: 65
Discharge: HOME | End: 2025-05-30
Payer: MEDICARE

## 2025-05-30 ENCOUNTER — HOSPITAL ENCOUNTER (OUTPATIENT)
Dept: RADIATION ONCOLOGY | Age: 65
Discharge: HOME | End: 2025-05-30
Payer: MEDICARE

## 2025-05-30 ENCOUNTER — OFFICE VISIT (OUTPATIENT)
Age: 65
End: 2025-05-30
Payer: MEDICARE

## 2025-05-30 ENCOUNTER — TELEPHONE (OUTPATIENT)
Age: 65
End: 2025-05-30

## 2025-05-30 VITALS
SYSTOLIC BLOOD PRESSURE: 118 MMHG | WEIGHT: 201 LBS | DIASTOLIC BLOOD PRESSURE: 73 MMHG | OXYGEN SATURATION: 95 % | HEART RATE: 72 BPM | HEIGHT: 72 IN | BODY MASS INDEX: 27.22 KG/M2

## 2025-05-30 DIAGNOSIS — C78.01 SQUAMOUS CELL CARCINOMA METASTATIC TO BRONCHUS OF RIGHT LOWER LOBE (HCC): Primary | ICD-10-CM

## 2025-05-30 DIAGNOSIS — C44.92 SQUAMOUS CELL CARCINOMA METASTATIC TO BRONCHUS OF RIGHT LOWER LOBE (HCC): Primary | ICD-10-CM

## 2025-05-30 LAB
ALBUMIN SERPL-MCNC: 3.8 G/DL (ref 3.5–5.2)
ALBUMIN/GLOB SERPL: 1.6 {RATIO} (ref 1–2.5)
ALP SERPL-CCNC: 70 U/L (ref 40–129)
ALT SERPL-CCNC: 49 U/L (ref 10–50)
ANION GAP SERPL CALCULATED.3IONS-SCNC: 8 MMOL/L (ref 9–16)
AST SERPL-CCNC: 35 U/L (ref 10–50)
BASOPHILS # BLD: 0.03 K/UL (ref 0–0.2)
BASOPHILS NFR BLD: 1 % (ref 0–2)
BILIRUB SERPL-MCNC: 0.5 MG/DL (ref 0–1.2)
BUN SERPL-MCNC: 9 MG/DL (ref 8–23)
CALCIUM SERPL-MCNC: 8.7 MG/DL (ref 8.6–10.4)
CHLORIDE SERPL-SCNC: 105 MMOL/L (ref 98–107)
CO2 SERPL-SCNC: 27 MMOL/L (ref 20–31)
CREAT SERPL-MCNC: 1 MG/DL (ref 0.7–1.2)
EOSINOPHIL # BLD: 0.03 K/UL (ref 0–0.4)
EOSINOPHILS RELATIVE PERCENT: 1 % (ref 1–4)
ERYTHROCYTE [DISTWIDTH] IN BLOOD BY AUTOMATED COUNT: 15.4 % (ref 12.5–15.4)
GFR, ESTIMATED: 82 ML/MIN/1.73M2
GLUCOSE SERPL-MCNC: 105 MG/DL (ref 74–99)
HCT VFR BLD AUTO: 34.8 % (ref 41–53)
HGB BLD-MCNC: 11.6 G/DL (ref 13.5–17.5)
LYMPHOCYTES NFR BLD: 0.43 K/UL (ref 1–4.8)
LYMPHOCYTES RELATIVE PERCENT: 14 % (ref 24–44)
MCH RBC QN AUTO: 31.1 PG (ref 26–34)
MCHC RBC AUTO-ENTMCNC: 33.4 G/DL (ref 31–37)
MCV RBC AUTO: 93.1 FL (ref 80–100)
MONOCYTES NFR BLD: 0.37 K/UL (ref 0.1–1.2)
MONOCYTES NFR BLD: 12 % (ref 2–11)
MORPHOLOGY: NORMAL
NEUTROPHILS NFR BLD: 72 % (ref 36–66)
NEUTS SEG NFR BLD: 2.24 K/UL (ref 1.8–7.7)
PLATELET # BLD AUTO: 170 K/UL (ref 140–450)
PMV BLD AUTO: 6.3 FL (ref 6–12)
POTASSIUM SERPL-SCNC: 4 MMOL/L (ref 3.7–5.3)
PROT SERPL-MCNC: 6.2 G/DL (ref 6.6–8.7)
RBC # BLD AUTO: 3.74 M/UL (ref 4.5–5.9)
SODIUM SERPL-SCNC: 140 MMOL/L (ref 136–145)
WBC OTHER # BLD: 3.1 K/UL (ref 3.5–11)

## 2025-05-30 PROCEDURE — 3017F COLORECTAL CA SCREEN DOC REV: CPT | Performed by: INTERNAL MEDICINE

## 2025-05-30 PROCEDURE — 77386 HC NTSTY MODUL RAD TX DLVR CPLX: CPT | Performed by: RADIOLOGY

## 2025-05-30 PROCEDURE — G8427 DOCREV CUR MEDS BY ELIG CLIN: HCPCS | Performed by: INTERNAL MEDICINE

## 2025-05-30 PROCEDURE — 2580000003 HC RX 258: Performed by: INTERNAL MEDICINE

## 2025-05-30 PROCEDURE — 2500000003 HC RX 250 WO HCPCS: Performed by: INTERNAL MEDICINE

## 2025-05-30 PROCEDURE — 85025 COMPLETE CBC W/AUTO DIFF WBC: CPT

## 2025-05-30 PROCEDURE — 96375 TX/PRO/DX INJ NEW DRUG ADDON: CPT

## 2025-05-30 PROCEDURE — 6360000002 HC RX W HCPCS: Performed by: INTERNAL MEDICINE

## 2025-05-30 PROCEDURE — G8417 CALC BMI ABV UP PARAM F/U: HCPCS | Performed by: INTERNAL MEDICINE

## 2025-05-30 PROCEDURE — 1159F MED LIST DOCD IN RCRD: CPT | Performed by: INTERNAL MEDICINE

## 2025-05-30 PROCEDURE — 1126F AMNT PAIN NOTED NONE PRSNT: CPT | Performed by: INTERNAL MEDICINE

## 2025-05-30 PROCEDURE — 96413 CHEMO IV INFUSION 1 HR: CPT

## 2025-05-30 PROCEDURE — 1036F TOBACCO NON-USER: CPT | Performed by: INTERNAL MEDICINE

## 2025-05-30 PROCEDURE — 96417 CHEMO IV INFUS EACH ADDL SEQ: CPT

## 2025-05-30 PROCEDURE — 1123F ACP DISCUSS/DSCN MKR DOCD: CPT | Performed by: INTERNAL MEDICINE

## 2025-05-30 PROCEDURE — 99214 OFFICE O/P EST MOD 30 MIN: CPT | Performed by: INTERNAL MEDICINE

## 2025-05-30 PROCEDURE — 80053 COMPREHEN METABOLIC PANEL: CPT

## 2025-05-30 RX ORDER — HEPARIN 100 UNIT/ML
500 SYRINGE INTRAVENOUS PRN
Status: DISCONTINUED | OUTPATIENT
Start: 2025-05-30 | End: 2025-05-31 | Stop reason: HOSPADM

## 2025-05-30 RX ORDER — PALONOSETRON 0.05 MG/ML
0.25 INJECTION, SOLUTION INTRAVENOUS ONCE
Status: COMPLETED | OUTPATIENT
Start: 2025-05-30 | End: 2025-05-30

## 2025-05-30 RX ORDER — FAMOTIDINE 10 MG/ML
20 INJECTION, SOLUTION INTRAVENOUS ONCE
Status: COMPLETED | OUTPATIENT
Start: 2025-05-30 | End: 2025-05-30

## 2025-05-30 RX ORDER — DIPHENHYDRAMINE HYDROCHLORIDE 50 MG/ML
50 INJECTION, SOLUTION INTRAMUSCULAR; INTRAVENOUS ONCE
Status: COMPLETED | OUTPATIENT
Start: 2025-05-30 | End: 2025-05-30

## 2025-05-30 RX ORDER — SODIUM CHLORIDE 9 MG/ML
5-250 INJECTION, SOLUTION INTRAVENOUS PRN
Status: DISCONTINUED | OUTPATIENT
Start: 2025-05-30 | End: 2025-05-31 | Stop reason: HOSPADM

## 2025-05-30 RX ORDER — SODIUM CHLORIDE 0.9 % (FLUSH) 0.9 %
5-40 SYRINGE (ML) INJECTION PRN
Status: DISCONTINUED | OUTPATIENT
Start: 2025-05-30 | End: 2025-05-31 | Stop reason: HOSPADM

## 2025-05-30 RX ORDER — DEXAMETHASONE SODIUM PHOSPHATE 10 MG/ML
10 INJECTION, SOLUTION INTRAMUSCULAR; INTRAVENOUS ONCE
Status: COMPLETED | OUTPATIENT
Start: 2025-05-30 | End: 2025-05-30

## 2025-05-30 RX ADMIN — DEXAMETHASONE SODIUM PHOSPHATE 10 MG: 10 INJECTION, SOLUTION INTRAMUSCULAR; INTRAVENOUS at 11:53

## 2025-05-30 RX ADMIN — SODIUM CHLORIDE 200 ML/HR: 0.9 INJECTION, SOLUTION INTRAVENOUS at 11:52

## 2025-05-30 RX ADMIN — CARBOPLATIN 220 MG: 10 INJECTION, SOLUTION INTRAVENOUS at 13:52

## 2025-05-30 RX ADMIN — SODIUM CHLORIDE, PRESERVATIVE FREE 10 ML: 5 INJECTION INTRAVENOUS at 10:33

## 2025-05-30 RX ADMIN — PACLITAXEL 96 MG: 6 INJECTION, SOLUTION INTRAVENOUS at 12:53

## 2025-05-30 RX ADMIN — FAMOTIDINE 20 MG: 10 INJECTION, SOLUTION INTRAVENOUS at 11:52

## 2025-05-30 RX ADMIN — SODIUM CHLORIDE, PRESERVATIVE FREE 10 ML: 5 INJECTION INTRAVENOUS at 14:25

## 2025-05-30 RX ADMIN — PALONOSETRON 0.25 MG: 0.05 INJECTION, SOLUTION INTRAVENOUS at 11:52

## 2025-05-30 RX ADMIN — HEPARIN 500 UNITS: 100 SYRINGE at 14:25

## 2025-05-30 RX ADMIN — DIPHENHYDRAMINE HYDROCHLORIDE 50 MG: 50 INJECTION INTRAMUSCULAR; INTRAVENOUS at 11:52

## 2025-05-30 NOTE — PROGRESS NOTES
[facility] Outpatient Oncology/Hematology Progress Note    Situation: Writer encountered Patient in the treatment cubicle of the infusion clinic.    Assessment: Pt shared that he was recently sick with a cold.  Christine and Vic Odom are staying with her mom until Pt is feeling better.  Pt appeared tired. Pt was on his phone so Writer did not stay long.  Writer offered blessings before ending short visit.      Intervention: Writer introduced herself and her services. Writer inquired about Pt's coping and needs. Writer explored Pt's sources of support and strength. Writer offered supportive presence and active listening. Writer affirmed Pt's strengths. Writer offered words of support and encouragement.     Outcome:  Pt thanked writer for the visit.    Plan: Spiritual [facility] Services are available for Patient (and Family) by phone and/or in person.     05/30/25 1139   Encounter Summary   Encounter Overview/Reason Spiritual/Emotional Needs   Service Provided For Patient   Referral/Consult From [facility]   Support System Significant other   Last Encounter  05/30/25   Complexity of Encounter Low   Begin Time 1129   End Time  1135   Total Time Calculated 6 min   Spiritual/Emotional needs   Type Spiritual Support   Assessment/Intervention/Outcome   Assessment Calm;Coping;Passive   Intervention Active listening;Sustaining Presence/Ministry of presence;Prayer (assurance of)/[place]   Outcome Comfort;Coping;Connection/Belonging;Expressed Gratitude;Peace   Plan and Referrals   Plan/Referrals Continue Support (comment)

## 2025-05-30 NOTE — PROGRESS NOTES
Pt here for C.7D.1 taxol carbo.  Arrives ambulatory.  Labs drawn from port, results reviewed.  Pt was seen by Dr. Rivers, order rec'd to proceed with tx.  Tx complete without incident.  Pt d/c'd in stable condition.

## 2025-05-30 NOTE — TELEPHONE ENCOUNTER
Name: Vic Nunez .  : 1960  MRN: [id number]    Oncology Navigation Follow-Up Note    Contact Type:  Telephone    Notes: Please schedule pt. For [place] F/U post CT chest(one month as directed)      Electronically signed by Jessica Moeller RN on 2025 at 1:52 PM

## 2025-05-31 NOTE — PROGRESS NOTES
cyanosis  Skin - normal coloration and turgor, no rashes, no suspicious skin lesions noted     Review of Diagnostic data:   Lab Results   Component Value Date    WBC 3.1 (L) 05/30/2025    HGB 11.6 (L) 05/30/2025    HCT 34.8 (L) 05/30/2025    MCV 93.1 05/30/2025     05/30/2025       Chemistry        Component Value Date/Time     05/30/2025 1035    K 4.0 05/30/2025 1035     05/30/2025 1035    CO2 27 05/30/2025 1035    BUN 9 05/30/2025 1035    CREATININE 1.0 05/30/2025 1035        Component Value Date/Time    CALCIUM 8.7 05/30/2025 1035    ALKPHOS 70 05/30/2025 1035    AST 35 05/30/2025 1035    ALT 49 05/30/2025 1035    BILITOT 0.5 05/30/2025 1035          @[facility]@      IMPRESSION:   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  Quit 2024    PLAN: Records, labs and images were reviewed and discussed with the patient. I explained to the patient the nature of this problem with lung cancer and underlying cause and management plan.  Patient understands that the course of this cancer is smoking.  He quit 2024.  Patient had past history of right lung adenocarcinoma status postresection and adjuvant chemotherapy in 2010.  He is not a surgical candidate.  Discussed the images and I had recommendations based on NCCN guidelines.  We recommend combined chemoradiation with weekly Taxol and carboplatin followed by immunotherapy using Imfinzi.  Explained benefits and side effects. I explained the benefits and possible side effects related to chemotherapy, which may include but not limited to nausea, vomiting, hair loss, mouth sores, allergy, neuropathy, fever infection sepsis, anemia and thrombocytopenia.  I reviewed labs as stated above and discussed them with the patient. Labs are adequate for chemotherapy treatment. We will proceed with chemotherapy as

## 2025-06-02 ENCOUNTER — TELEPHONE (OUTPATIENT)
Age: 65
End: 2025-06-02

## 2025-06-02 ENCOUNTER — HOSPITAL ENCOUNTER (OUTPATIENT)
Dept: RADIATION ONCOLOGY | Age: 65
Discharge: HOME | End: 2025-06-02
Payer: MEDICARE

## 2025-06-02 PROCEDURE — 77386 HC NTSTY MODUL RAD TX DLVR CPLX: CPT | Performed by: RADIOLOGY

## 2025-06-02 NOTE — TELEPHONE ENCOUNTER
Patient called to confirm ride for today.  reviewed ride details for today and tomorrow. Upon chart review patient also scheduled for 6/4. Ride scheduled through insurance provider.    Transportation details:  [place] [phone]   1:15pm  Confirmation [id number]  Return ride 3:00pm

## 2025-06-03 ENCOUNTER — HOSPITAL ENCOUNTER (OUTPATIENT)
Dept: RADIATION ONCOLOGY | Age: 65
Discharge: HOME | End: 2025-06-03
Payer: MEDICARE

## 2025-06-03 PROCEDURE — 77386 HC NTSTY MODUL RAD TX DLVR CPLX: CPT | Performed by: RADIOLOGY

## 2025-06-04 ENCOUNTER — HOSPITAL ENCOUNTER (OUTPATIENT)
Dept: RADIATION ONCOLOGY | Age: 65
Discharge: HOME | End: 2025-06-04
Payer: MEDICARE

## 2025-06-04 ENCOUNTER — CLINICAL DOCUMENTATION (OUTPATIENT)
Dept: RADIATION ONCOLOGY | Age: 65
End: 2025-06-04

## 2025-06-04 VITALS
HEART RATE: 94 BPM | TEMPERATURE: 97.2 F | RESPIRATION RATE: 16 BRPM | BODY MASS INDEX: 26.31 KG/M2 | WEIGHT: 194 LBS | SYSTOLIC BLOOD PRESSURE: 123 MMHG | DIASTOLIC BLOOD PRESSURE: 86 MMHG | OXYGEN SATURATION: 96 %

## 2025-06-04 DIAGNOSIS — C44.92 SQUAMOUS CELL CARCINOMA METASTATIC TO BRONCHUS OF RIGHT LOWER LOBE (HCC): Primary | ICD-10-CM

## 2025-06-04 DIAGNOSIS — C78.01 SQUAMOUS CELL CARCINOMA METASTATIC TO BRONCHUS OF RIGHT LOWER LOBE (HCC): Primary | ICD-10-CM

## 2025-06-04 PROCEDURE — 77386 HC NTSTY MODUL RAD TX DLVR CPLX: CPT | Performed by: RADIOLOGY

## 2025-06-04 ASSESSMENT — PAIN SCALES - GENERAL: PAINLEVEL_OUTOF10: 10

## 2025-06-04 ASSESSMENT — PAIN DESCRIPTION - LOCATION: LOCATION: BACK

## 2025-06-04 NOTE — PROGRESS NOTES
Vic Nunez Sr.  6/4/2025  Wt Readings from Last 3 Encounters:   06/04/25 88 kg (194 lb)   05/30/25 91.2 kg (201 lb)   05/29/25 91.2 kg (201 lb)     Body mass index is 26.31 kg/m².        Treatment Area: right upper lung    Patient was seen today for weekly visit.      Comfort Alteration  Fatigue: Mild    Ventilation Alterations  Cough: No  Hemoptysis: No  Mucus Color: none  Dyspnea: No      Nutritional Alteration  Anorexia: No  Nausea: No   Vomiting: No     Mucous Membrane Alteration  Voice Changes/ Stridor/Larynx: no  Pharynx & Esophagus: in tact    Elimination Alterations  Constipation: no  Diarrhea:  no    Skin Alteration   Sensation: in tact    Radiation Dermatitis:  Intact [x]     Erythema  []     Discoloration  []     Rash []     Dry desquamation  []     Moist desquamation []       Emotional  Coping: effective      Injury, potential bleeding or infection: none currently    Lab Results   Component Value Date    WBC 3.1 (L) 05/30/2025    HGB 11.6 (L) 05/30/2025    HCT 34.8 (L) 05/30/2025     05/30/2025         /86   Pulse 94   Temp 97.2 °F (36.2 °C) (Temporal)   Resp 16   Wt 88 kg (194 lb)   SpO2 96%   BMI 26.31 kg/m²      Pain Assessment: 0-10  Pain Level: 10         Assessment/Plan: Patient was seen today for weekly visit.  Ambulatory on arrival with a steady gait.  Pt states he is having back pain and is hoping the doctor will prescribe him pain medication.  Denies any issues with breathing, shortness of breath, not coughing anything up.  Eating ok, but a little decreased and has lost some weight.  States he is feeling better after the cold he recently had.  Pt to follow up in office after spine MRI which they would like done ASAP.    Amparo Mcleod RN

## 2025-06-04 NOTE — PROGRESS NOTES
[facility]       Radiation Oncology          [address] [place] [address]        O: [phone]        F: [phone]       [facility]             RADIATION ONCOLOGY WEEKLY PROGRESS NOTE  Patient ID:   Vic Nunez Sr.  : 1960   MRN: [id number]    Location:  [facility] Radiation Oncology,   [address] [place] [address]., [place] [address]   [phone]    DIAGNOSIS:  Squamous cell carcinoma of the right lower lobe T3 N0 M0     TREATMENT DETAILS:  Treatment Site: RLL  Actual Dose: 6000cGy  Total Planned Dose: 6000cGy  Treatment Technique: IMRT  Fraction Technique: Daily  Therapy imaging monitoring: CBCT daily  Concurrent Chemotherapy: Weekly CarboTaxol    SUBJECTIVE:   Patient seen for their weekly on treatment evaluation today.  States breathing and cough had improved since last week.  However he states his mid and lower back pain continues to get progressively worse.  Denies focal neurological deficits.    OBJECTIVE:     ECO Asymptomatic    VITAL SIGNS: /86   Pulse 94   Temp 97.2 °F (36.2 °C) (Temporal)   Resp 16   Wt 88 kg (194 lb)   SpO2 96%   BMI 26.31 kg/m²   Wt Readings from Last 5 Encounters:   25 88 kg (194 lb)   25 91.2 kg (201 lb)   25 91.2 kg (201 lb)   25 91.4 kg (201 lb 9.6 oz)   25 91.4 kg (201 lb 6.4 oz)     GENERAL:  General appearance is that of a well-nourished, well-developed in no apparent distress.  HEART:  Normal rate and regular rhythm  LUNGS:  Pulmonary effort normal.  ABDOMEN:  Soft, nontender, non distended  EXTREMITIES:  No edema.  No calf tenderness.  MSK:  No spinal tenderness. Normal ROM.  NEUROLOGICAL: Alert and oriented. Strength and sensation intact bilaterally. No focal deficits.   PSYCH: Mood normal, behavior normal.      LABS:  WBC   Date Value Ref Range Status   2025 3.1 (L) 3.5 - 11.0 k/uL Final   2025 4.2 3.5 - 11.0 k/uL Final   2025 3.3 (L)

## 2025-06-05 ENCOUNTER — CLINICAL DOCUMENTATION (OUTPATIENT)
Dept: RADIATION ONCOLOGY | Age: 65
End: 2025-06-05

## 2025-06-05 NOTE — PROGRESS NOTES
[facility]            Radiation Oncology          [address]          [place] [address]        O: [phone]        F: [phone]       [facility]           Dear Dr Clements ref. provider found:    Thank you for referring Vic Nunez Sr. to me for evaluation and treatment. Below is a summary of the patient's recently completed radiation course.  If you have questions, please do not hesitate to call me. I look forward to following this patient along with you.    Sincerely,  Electronically signed by Chanelle Junior MD on 2025 at 8:35 AM EDT      CC: Patient Care Team:  Nakia Alexander MD as PCP - General (Family Medicine)  Henri Oliveira MD as Consulting Physician (Hematology and Oncology)  Isabel Marcano (Licensed Clinical )  London Michel MD as Consulting Physician (Pulmonary Disease)  Yan Xiao MD as Consulting Physician (Pulmonology)  Jessica Moeller RN as Nurse Navigator (Oncology)  ------------------------------------------------------------------------------------------------------------------------------------------------------------------------------------------        Date of Service: 2025     Location:  [facility] Radiation Oncology,   [address], [place] [id number] [phone]        RADIATION ONCOLOGY END OF TREATMENT SUMMARY:    Patient ID:   Vic Nunez   : 1960   MRN: [id number]    DIAGNOSIS:  Squamous cell carcinoma of the right lower lobe T3 N0 M0     TREATMENT DETAILS:  Treatment Site: RLL  Actual Dose: 6000cGy  Total Planned Dose: 6000cGy  Treatment Technique: IMRT  Fraction Technique: Daily  Therapy imaging monitoring: CBCT daily  Concurrent Chemotherapy: Weekly CarboTaxol  Total number of fractions: 30  Treatment dates: 2025 to 2025    CLINICAL COURSE:    ECOG 0    Patient completed his course of concurrent chemoRT as prescribed.  During his course of treatment,

## 2025-06-09 DIAGNOSIS — C78.01 SQUAMOUS CELL CARCINOMA METASTATIC TO BRONCHUS OF RIGHT LOWER LOBE (HCC): ICD-10-CM

## 2025-06-09 DIAGNOSIS — C44.92 SQUAMOUS CELL CARCINOMA METASTATIC TO BRONCHUS OF RIGHT LOWER LOBE (HCC): ICD-10-CM

## 2025-06-09 RX ORDER — DULOXETIN HYDROCHLORIDE 30 MG/1
30 CAPSULE, DELAYED RELEASE ORAL DAILY
Qty: 90 CAPSULE | Refills: 0 | Status: SHIPPED | OUTPATIENT
Start: 2025-06-09

## 2025-06-09 NOTE — TELEPHONE ENCOUNTER
is <100)   AST (U/L)   Date Value   05/30/2025 35     ALT (U/L)   Date Value   05/30/2025 49     BUN (mg/dL)   Date Value   05/30/2025 9     BP Readings from Last 3 Encounters:   06/04/25 123/86   05/30/25 118/73   05/29/25 114/67          (goal 120/80)    All Future Testing planned in [facility]  Lab Frequency Next Occurrence   Vitamin D 25 Hydroxy Once 06/18/2024   XR CHEST PA LATERAL W BOTH OBLIQUE PROJECTIONS Once 05/27/2025   CT CHEST W CONTRAST Once 06/30/2025   MRI THORACIC SPINE W WO CONTRAST Once 06/04/2025   MRI LUMBAR SPINE W WO CONTRAST Once 06/04/2025   CBC with Auto Differential     Comprehensive Metabolic Panel                 Patient Active Problem List:     Cancer (HCC)     Chronic back pain     Lung cancer (HCC)     Displacement of lumbar intervertebral disc without myelopathy     Other symptoms referable to back     Marijuana abuse     Major depression     Tubular adenoma of colon     Prediabetes     Depression     Vitamin D deficiency     Need for pneumococcal vaccination     Shoulder pain     Tobacco abuse     Dyspnea on exertion     Hyperlipidemia     Squamous cell carcinoma metastatic to bronchus of right lower lobe (HCC)

## 2025-06-09 NOTE — TELEPHONE ENCOUNTER
Last visit: 5/30/25  Last Med refill: 5/13/25  Does patient have enough medication for 72 hours:   Next Visit Date:  Future Appointments   Date Time Provider Department Center   6/25/2025  4:00 PM [place] [facility] RM [facility] PB MRI [facility] [place]   6/25/2025  5:00 PM [place] [facility] MRI [facility] [place]   6/30/2025 10:00 AM [place] CT RM [facility] PB CT [facility] [place]   7/10/2025 11:15 AM SCHEDULE, [facility] RAD ONC NURSE [facility] PB [facility] [place]   7/14/2025  1:00 PM Yan Xiao MD Resp Spec [id number]       Health Maintenance   Topic Date Due    Shingles vaccine (1 of 2) Never done    Respiratory Syncytial Virus (RSV) Pregnant or age 60 yrs+ (1 - Risk 60-74 years 1-dose series) Never done    COVID-19 Vaccine (2 - Pfizer risk series) 11/21/2023    Annual Wellness Visit (Medicare)  06/01/2025    Depression Monitoring  03/14/2026    DTaP/Tdap/Td vaccine (2 - Td or Tdap) 12/01/2026    Flu vaccine  Completed    Pneumococcal 50+ years Vaccine  Completed    Hepatitis A vaccine  Aged Out    Hepatitis B vaccine  Aged Out    Hib vaccine  Aged Out    Polio vaccine  Aged Out    Meningococcal (ACWY) vaccine  Aged Out    Meningococcal B vaccine  Aged Out    A1C test (Diabetic or Prediabetic)  Discontinued    Lipids  Discontinued    Colorectal Cancer Screen  Discontinued    Pneumococcal 0-49 years Vaccine  Discontinued    AAA screen  Discontinued    Hepatitis C screen  Discontinued    HIV screen  Discontinued    Lung Cancer Screening &/or Counseling  Discontinued       Hemoglobin A1C (%)   Date Value   09/26/2024 6.1   05/03/2024 6.2   10/02/2023 6.0             ( goal A1C is < 7)   No components found for: \"LABMICR\"  No components found for: \"LDLCHOLESTEROL\", \"LDLCALC\"    (goal LDL is <100)   AST (U/L)   Date Value   05/30/2025 35     ALT (U/L)   Date Value   05/30/2025 49     BUN (mg/dL)   Date Value   05/30/2025 9     BP Readings from Last 3 Encounters:   06/04/25 123/86   05/30/25 118/73   05/29/25 114/67          (goal

## 2025-06-10 RX ORDER — HYDROCODONE BITARTRATE AND ACETAMINOPHEN 10; 325 MG/1; MG/1
1 TABLET ORAL EVERY 6 HOURS PRN
Qty: 56 TABLET | Refills: 0 | Status: SHIPPED | OUTPATIENT
Start: 2025-06-10 | End: 2025-06-24

## 2025-06-17 ENCOUNTER — OFFICE VISIT (OUTPATIENT)
Age: 65
End: 2025-06-17
Payer: MEDICARE

## 2025-06-17 VITALS
BODY MASS INDEX: 27.06 KG/M2 | SYSTOLIC BLOOD PRESSURE: 115 MMHG | HEART RATE: 82 BPM | DIASTOLIC BLOOD PRESSURE: 71 MMHG | HEIGHT: 72 IN | OXYGEN SATURATION: 98 % | WEIGHT: 199.8 LBS

## 2025-06-17 DIAGNOSIS — M54.41 CHRONIC BILATERAL LOW BACK PAIN WITH BILATERAL SCIATICA: Primary | ICD-10-CM

## 2025-06-17 DIAGNOSIS — M54.42 CHRONIC BILATERAL LOW BACK PAIN WITH BILATERAL SCIATICA: Primary | ICD-10-CM

## 2025-06-17 DIAGNOSIS — G89.29 CHRONIC BILATERAL LOW BACK PAIN WITH BILATERAL SCIATICA: Primary | ICD-10-CM

## 2025-06-17 PROCEDURE — 1036F TOBACCO NON-USER: CPT

## 2025-06-17 PROCEDURE — 1125F AMNT PAIN NOTED PAIN PRSNT: CPT

## 2025-06-17 PROCEDURE — 99213 OFFICE O/P EST LOW 20 MIN: CPT

## 2025-06-17 PROCEDURE — 1123F ACP DISCUSS/DSCN MKR DOCD: CPT

## 2025-06-17 PROCEDURE — 3017F COLORECTAL CA SCREEN DOC REV: CPT

## 2025-06-17 PROCEDURE — G8427 DOCREV CUR MEDS BY ELIG CLIN: HCPCS

## 2025-06-17 PROCEDURE — G8417 CALC BMI ABV UP PARAM F/U: HCPCS

## 2025-06-17 PROCEDURE — 1159F MED LIST DOCD IN RCRD: CPT

## 2025-06-17 RX ORDER — CYCLOBENZAPRINE HCL 5 MG
5 TABLET ORAL 2 TIMES DAILY PRN
Qty: 10 TABLET | Refills: 0 | Status: SHIPPED | OUTPATIENT
Start: 2025-06-17 | End: 2025-06-27

## 2025-06-17 ASSESSMENT — PATIENT HEALTH QUESTIONNAIRE - PHQ9
SUM OF ALL RESPONSES TO PHQ QUESTIONS 1-9: 0
SUM OF ALL RESPONSES TO PHQ QUESTIONS 1-9: 0
4. FEELING TIRED OR HAVING LITTLE ENERGY: NOT AT ALL
6. FEELING BAD ABOUT YOURSELF - OR THAT YOU ARE A FAILURE OR HAVE LET YOURSELF OR YOUR FAMILY DOWN: NOT AT ALL
SUM OF ALL RESPONSES TO PHQ QUESTIONS 1-9: 0
8. MOVING OR SPEAKING SO SLOWLY THAT OTHER PEOPLE COULD HAVE NOTICED. OR THE OPPOSITE, BEING SO FIGETY OR RESTLESS THAT YOU HAVE BEEN MOVING AROUND A LOT MORE THAN USUAL: NOT AT ALL
5. POOR APPETITE OR OVEREATING: NOT AT ALL
2. FEELING DOWN, DEPRESSED OR HOPELESS: NOT AT ALL
1. LITTLE INTEREST OR PLEASURE IN DOING THINGS: NOT AT ALL
SUM OF ALL RESPONSES TO PHQ QUESTIONS 1-9: 0
3. TROUBLE FALLING OR STAYING ASLEEP: NOT AT ALL
9. THOUGHTS THAT YOU WOULD BE BETTER OFF DEAD, OR OF HURTING YOURSELF: NOT AT ALL

## 2025-06-17 ASSESSMENT — ENCOUNTER SYMPTOMS
DIARRHEA: 0
CONSTIPATION: 0
VOMITING: 0
NAUSEA: 0
SHORTNESS OF BREATH: 0
ABDOMINAL PAIN: 0

## 2025-06-17 NOTE — PATIENT INSTRUCTIONS
Thank you for letting us take care of you today. We hope all your questions were addressed. If a question was overlooked or something else comes to mind after you return home, please contact a member of your Care Team listed below.      Your Care Team at [facility] is Team #2  Aba Parra M.D. (Faculty)  Giovani Gonzalez M.D. (Resident)  Marleny Santillan M.D. (Resident)  Nakia Alexander M.D. (Resident)  Shazia Tim M.D. (Resident)  Ai Mark M.D. (Resident)  Jean-Claude Fagan, [facility]  Muriel Robertson, [facility]  Analilia Franklin,  [facility]  Evon Velez, [facility]  Nesha Nunez, [facility]  Tawanna Espinosa, [facility]  Anastasia Morris (LJ) Kian ADDISON (Clinical Practice Manager)  Ignacia Uriostegui [facility] (Clinical Pharmacist)     Office phone number: [phone]    If you need to get in right away due to illness, please be advised we have \"Same Day\" appointments available Monday-Friday. Please call us at [phone] option #3 to schedule your \"Same Day\" appointment.

## 2025-06-17 NOTE — PROGRESS NOTES
Attending Physician Statement  I  have discussed the care of Vic Nunez including pertinent history and exam findings with the resident. I agree with the assessment, plan and orders as documented by the resident.      /71 (BP Site: Right Upper Arm, Patient Position: Sitting, BP Cuff Size: Medium Adult)   Pulse 82   Ht 1.829 m (6')   Wt 90.6 kg (199 lb 12.8 oz)   SpO2 98%   BMI 27.10 kg/m²    BP Readings from Last 3 Encounters:   06/17/25 115/71   06/04/25 123/86   05/30/25 118/73     Wt Readings from Last 3 Encounters:   06/17/25 90.6 kg (199 lb 12.8 oz)   06/04/25 88 kg (194 lb)   05/30/25 91.2 kg (201 lb)          Diagnosis Orders   1. Chronic bilateral low back pain with bilateral sciatica  [facility] Pain Management    [facility] Physical Therapy [facility]    cyclobenzaprine (FLEXERIL) 5 MG tablet              Aba Parra MD 6/17/2025 4:00 PM      
Visit Information    Have you changed or started any medications since your last visit including any over-the-counter medicines, vitamins, or herbal medicines? no   Have you stopped taking any of your medications? Is so, why? -  no  Are you having any side effects from any of your medications? - no    Have you seen any other physician or provider since your last visit?  yes - Pulmonology, oncology, vascular surgery,    Have you had any other diagnostic tests since your last visit?  yes - eKG, Labs, CT, PET, XR   Have you been seen in the emergency room and/or had an admission in a hospital since we last saw you?  yes - [place]   Have you had your routine dental cleaning in the past 6 months?  no     Do you have an active MyChart account? If no, what is the barrier?  Yes    Patient Care Team:  Nakia Alexander MD as PCP - General (Family Medicine)  Henri Oliveira MD as Consulting Physician (Hematology and Oncology)  Isabel Marcano (Licensed Clinical )  London Michel MD as Consulting Physician (Pulmonary Disease)  Yan Xiao MD as Consulting Physician (Pulmonology)  Jessica Moeller RN as Nurse Navigator (Oncology)    Medical History Review  Past Medical, Family, and Social History reviewed and does contribute to the patient presenting condition    Health Maintenance   Topic Date Due    Shingles vaccine (1 of 2) Never done    Respiratory Syncytial Virus (RSV) Pregnant or age 60 yrs+ (1 - Risk 60-74 years 1-dose series) Never done    COVID-19 Vaccine (2 - Pfizer risk series) 11/21/2023    Annual Wellness Visit (Medicare)  06/01/2025    Depression Monitoring  03/14/2026    DTaP/Tdap/Td vaccine (2 - Td or Tdap) 12/01/2026    Flu vaccine  Completed    Pneumococcal 50+ years Vaccine  Completed    Hepatitis A vaccine  Aged Out    Hepatitis B vaccine  Aged Out    Hib vaccine  Aged Out    Polio vaccine  Aged Out    Meningococcal (ACWY) vaccine  Aged Out    Meningococcal B vaccine  Aged Out    
constipation, diarrhea, nausea and vomiting.   Endocrine: Negative.    Genitourinary:  Negative for dysuria.   Musculoskeletal:  Positive for arthralgias (lumbar spine).   Skin:  Negative for rash.   Neurological:  Negative for headaches.   Psychiatric/Behavioral: Negative.                   The patient has a   Family History   Problem Relation Age of Onset    Cancer Mother         lung    Cancer Brother         lung    Cancer Maternal Grandmother         lung    Cancer Maternal Grandfather         lung       Objective:    /71 (BP Site: Right Upper Arm, Patient Position: Sitting, BP Cuff Size: Medium Adult)   Pulse 82   Ht 1.829 m (6')   Wt 90.6 kg (199 lb 12.8 oz)   SpO2 98%   BMI 27.10 kg/m²    BP Readings from Last 3 Encounters:   06/17/25 115/71   06/04/25 123/86   05/30/25 118/73       Physical Exam  Constitutional:       General: He is not in acute distress.  Cardiovascular:      Rate and Rhythm: Normal rate and regular rhythm.      Pulses: Normal pulses.      Heart sounds: Normal heart sounds. No murmur heard.  Pulmonary:      Effort: Pulmonary effort is normal. No respiratory distress.      Breath sounds: Normal breath sounds. No wheezing.   Abdominal:      General: There is no distension.      Palpations: Abdomen is soft.      Tenderness: There is no abdominal tenderness.   Musculoskeletal:      Lumbar back: Tenderness present. Decreased range of motion. Positive right straight leg raise test and positive left straight leg raise test.      Right lower leg: No edema.      Left lower leg: No edema.   Skin:     General: Skin is warm and dry.   Neurological:      Mental Status: He is alert.         Lab Results   Component Value Date    WBC 3.1 (L) 05/30/2025    HGB 11.6 (L) 05/30/2025    HCT 34.8 (L) 05/30/2025     05/30/2025    CHOL 181 07/23/2020    TRIG 126 07/23/2020    HDL 51 07/23/2020    ALT 49 05/30/2025    AST 35 05/30/2025     05/30/2025    K 4.0 05/30/2025     05/30/2025

## 2025-06-25 ENCOUNTER — TELEPHONE (OUTPATIENT)
Age: 65
End: 2025-06-25

## 2025-06-25 NOTE — TELEPHONE ENCOUNTER
Patient called asking if he had a ride scheduled for today.  states no ride scheduled as he was unaware of upcoming appointments. Patient states he will reschedule but doesn't have information.  provided number to reschedule. Per chart review patient has appointment 6/30 too.  asked if ride needed. Patient states yes.  encouraged patient to get appointments rescheduled and let him know of future appointments for rides to be scheduled. Ride scheduled through insurance provider.    Transportation details:  [place] [phone]   9:00am  Confirmation #[id number]  Call for return ride

## 2025-06-30 ENCOUNTER — HOSPITAL ENCOUNTER (OUTPATIENT)
Dept: CT IMAGING | Age: 65
Discharge: HOME | End: 2025-07-02
Attending: INTERNAL MEDICINE
Payer: MEDICARE

## 2025-06-30 DIAGNOSIS — C44.92 SQUAMOUS CELL CARCINOMA METASTATIC TO BRONCHUS OF RIGHT LOWER LOBE (HCC): ICD-10-CM

## 2025-06-30 DIAGNOSIS — C78.01 SQUAMOUS CELL CARCINOMA METASTATIC TO BRONCHUS OF RIGHT LOWER LOBE (HCC): ICD-10-CM

## 2025-06-30 PROCEDURE — 2580000003 HC RX 258: Performed by: INTERNAL MEDICINE

## 2025-06-30 PROCEDURE — 6360000004 HC RX CONTRAST MEDICATION: Performed by: INTERNAL MEDICINE

## 2025-06-30 PROCEDURE — 2500000003 HC RX 250 WO HCPCS: Performed by: INTERNAL MEDICINE

## 2025-06-30 PROCEDURE — 71260 CT THORAX DX C+: CPT

## 2025-06-30 RX ORDER — IOPAMIDOL 755 MG/ML
75 INJECTION, SOLUTION INTRAVASCULAR
Status: COMPLETED | OUTPATIENT
Start: 2025-06-30 | End: 2025-06-30

## 2025-06-30 RX ORDER — SODIUM CHLORIDE 0.9 % (FLUSH) 0.9 %
10 SYRINGE (ML) INJECTION PRN
Status: DISCONTINUED | OUTPATIENT
Start: 2025-06-30 | End: 2025-07-03 | Stop reason: HOSPADM

## 2025-06-30 RX ORDER — 0.9 % SODIUM CHLORIDE 0.9 %
80 INTRAVENOUS SOLUTION INTRAVENOUS ONCE
Status: COMPLETED | OUTPATIENT
Start: 2025-06-30 | End: 2025-06-30

## 2025-06-30 RX ADMIN — SODIUM CHLORIDE 80 ML: 9 INJECTION, SOLUTION INTRAVENOUS at 09:57

## 2025-06-30 RX ADMIN — SODIUM CHLORIDE, PRESERVATIVE FREE 10 ML: 5 INJECTION INTRAVENOUS at 09:57

## 2025-06-30 RX ADMIN — IOPAMIDOL 75 ML: 755 INJECTION, SOLUTION INTRAVENOUS at 09:56

## 2025-07-02 ENCOUNTER — TELEPHONE (OUTPATIENT)
Dept: RADIATION ONCOLOGY | Age: 65
End: 2025-07-02

## 2025-07-02 NOTE — TELEPHONE ENCOUNTER
Pts MRI spine were changed to 7/21/25, therefore, I moved Dr. Junior's appt out from 7/10/25 to 8/1/25.  I called pt and advised him of changes.

## 2025-07-03 ENCOUNTER — HOSPITAL ENCOUNTER (OUTPATIENT)
Age: 65
Setting detail: THERAPIES SERIES
Discharge: HOME | End: 2025-07-03
Payer: MEDICARE

## 2025-07-03 PROCEDURE — 97161 PT EVAL LOW COMPLEX 20 MIN: CPT

## 2025-07-03 PROCEDURE — 97110 THERAPEUTIC EXERCISES: CPT

## 2025-07-10 ENCOUNTER — HOSPITAL ENCOUNTER (OUTPATIENT)
Age: 65
Setting detail: THERAPIES SERIES
Discharge: HOME | End: 2025-07-10
Payer: MEDICARE

## 2025-07-10 NOTE — FLOWSHEET NOTE
[x] [facility]  Outpatient Rehabilitation &  Therapy  [address]  [phone]  [phone] [] [facility]  Outpatient Rehabilitation &  Therapy  [address]  P: [phone]  F: [phone] [] [facility]  Outpatient Rehabilitation &  Therapy  [address]  P: [phone]  F: [phone] [] [facility]  Outpatient Rehabilitation &  Therapy  [address]  [phone]  [phone] [] [facility]  Outpatient Rehabilitation &  Therapy  [address]   P: [phone]  F: [phone]  [] [facility]  Outpatient Rehabilitation &  Therapy  [address]  P: [phone]  F: [phone] [] [facility]  Outpatient Rehabilitation &  Therapy  [address]  P: [phone]  F: [phone] [] [facility]  Outpatient Rehabilitation &  Therapy  [address]  P: [phone]  F: [phone] [] [facility]  Outpatient Rehabilitation &  Therapy  [address]  P: [phone]  F: [phone]  [] [facility] Outpatient Rehabilitation &  Therapy  [address]  P: [phone]  F: [phone] [] [facility] Outpatient Rehabilitation &  Therapy  [address]  P: [phone]   F: [phone]      Therapy Cancel/No Show note    Date: 7/10/2025  Patient: Ho Enrique Hill  : 1960  MRN: [id number]    Cancels/No Shows to date:     For today's appointment patient:    [x]  Cancelled    [] Rescheduled appointment    [] No-show     Reason given by patient:    []  Patient ill    [x]  Conflicting appointment    [] No transportation      [] Conflict with work    [] No reason given    [] Weather related    [] COVID-19    [] Other:      Comments:        [x] Next appointment was confirmed at

## 2025-07-11 ENCOUNTER — OFFICE VISIT (OUTPATIENT)
Age: 65
End: 2025-07-11

## 2025-07-11 VITALS
BODY MASS INDEX: 26.36 KG/M2 | DIASTOLIC BLOOD PRESSURE: 66 MMHG | WEIGHT: 194.6 LBS | HEIGHT: 72 IN | SYSTOLIC BLOOD PRESSURE: 116 MMHG

## 2025-07-11 DIAGNOSIS — M51.26 DISPLACEMENT OF LUMBAR INTERVERTEBRAL DISC WITHOUT MYELOPATHY: Chronic | ICD-10-CM

## 2025-07-11 DIAGNOSIS — C44.92 SQUAMOUS CELL CARCINOMA METASTATIC TO BRONCHUS OF RIGHT LOWER LOBE (HCC): ICD-10-CM

## 2025-07-11 DIAGNOSIS — C78.01 SQUAMOUS CELL CARCINOMA METASTATIC TO BRONCHUS OF RIGHT LOWER LOBE (HCC): ICD-10-CM

## 2025-07-11 DIAGNOSIS — M54.41 CHRONIC BILATERAL LOW BACK PAIN WITH BILATERAL SCIATICA: Primary | ICD-10-CM

## 2025-07-11 DIAGNOSIS — G89.29 CHRONIC BILATERAL LOW BACK PAIN WITH BILATERAL SCIATICA: Primary | ICD-10-CM

## 2025-07-11 DIAGNOSIS — M54.42 CHRONIC BILATERAL LOW BACK PAIN WITH BILATERAL SCIATICA: Primary | ICD-10-CM

## 2025-07-11 RX ORDER — LIDOCAINE 50 MG/G
1 PATCH TOPICAL DAILY
Qty: 30 PATCH | Refills: 0 | Status: SHIPPED | OUTPATIENT
Start: 2025-07-11 | End: 2025-08-10

## 2025-07-11 ASSESSMENT — ENCOUNTER SYMPTOMS
SHORTNESS OF BREATH: 0
NAUSEA: 0
ABDOMINAL PAIN: 0
STRIDOR: 0
VOMITING: 0
COUGH: 0
BACK PAIN: 1
DIARRHEA: 0

## 2025-07-11 NOTE — PROGRESS NOTES
Visit Information    Have you changed or started any medications since your last visit including any over-the-counter medicines, vitamins, or herbal medicines? no   Have you stopped taking any of your medications? Is so, why? -  no  Are you having any side effects from any of your medications? - no    Have you seen any other physician or provider since your last visit?  yes - PT   Have you had any other diagnostic tests since your last visit?  yes - CT   Have you been seen in the emergency room and/or had an admission in a hospital since we last saw you?  no   Have you had your routine dental cleaning in the past 6 months?  no     Do you have an active MyChart account? If no, what is the barrier?  Yes    Patient Care Team:  Nakia Alexander MD as PCP - General (Family Medicine)  Henri Oliveira MD as Consulting Physician (Hematology and Oncology)  Isabel Marcano (Licensed Clinical )  London Michel MD as Consulting Physician (Pulmonary Disease)  Yan Xiao MD as Consulting Physician (Pulmonology)  Jessica Moeller RN as Nurse Navigator (Oncology)    Medical History Review  Past Medical, Family, and Social History reviewed and does contribute to the patient presenting condition    Health Maintenance   Topic Date Due    Shingles vaccine (1 of 2) Never done    Respiratory Syncytial Virus (RSV) Pregnant or age 60 yrs+ (1 - Risk 60-74 years 1-dose series) Never done    COVID-19 Vaccine (2 - Pfizer risk series) 11/21/2023    Annual Wellness Visit (Medicare)  06/01/2025    Flu vaccine (1) 08/01/2025    Depression Monitoring  06/17/2026    DTaP/Tdap/Td vaccine (2 - Td or Tdap) 12/01/2026    Pneumococcal 50+ years Vaccine  Completed    Hepatitis A vaccine  Aged Out    Hepatitis B vaccine  Aged Out    Hib vaccine  Aged Out    Polio vaccine  Aged Out    Meningococcal (ACWY) vaccine  Aged Out    Meningococcal B vaccine  Aged Out    A1C test (Diabetic or Prediabetic)  Discontinued    Lipids  Discontinued

## 2025-07-11 NOTE — PROGRESS NOTES
Attending Physician Statement  I have discussed the care of Vic Enrique Hill, including pertinent history and exam findings,  with the resident. I have reviewed the key elements of all parts of the encounter with the resident.  I agree with the assessment, plan and orders as documented by the resident.  (GE Modifier)    Tamiko Morocho MD
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
## 2025-07-11 NOTE — PATIENT INSTRUCTIONS
Thank you for letting us take care of you today. We hope all your questions were addressed. If a question was overlooked or something else comes to mind after you return home, please contact a member of your Care Team listed below.      Your Care Team at [facility] is Team #2  Aba Parra M.D. (Faculty)  Giovani Gonzalez M.D. (Resident)  Marleny Santillan M.D. (Resident)  Nakia Alexander M.D. (Resident)  Shazia Tim M.D. (Resident)  Ai Mark M.D. (Resident)  Jean-Claude Fagan, [facility]  Muriel Robertson, [facility]  Analilia Franklin,  [facility]  Evon Velez, [facility]  Nesha Nunez, [facility]  Tawanna Espinosa, [facility]  Anastasia Morris (LJ) Kian ADDISON (Clinical Practice Manager)  Ignacia Uriostegui [facility] (Clinical Pharmacist)     Office phone number: [phone]    If you need to get in right away due to illness, please be advised we have \"Same Day\" appointments available Monday-Friday. Please call us at [phone] option #3 to schedule your \"Same Day\" appointment.

## 2025-07-15 ENCOUNTER — HOSPITAL ENCOUNTER (OUTPATIENT)
Age: 65
Setting detail: THERAPIES SERIES
Discharge: HOME | End: 2025-07-15
Payer: MEDICARE

## 2025-07-15 PROCEDURE — 97110 THERAPEUTIC EXERCISES: CPT

## 2025-07-15 PROCEDURE — 97140 MANUAL THERAPY 1/> REGIONS: CPT

## 2025-07-15 NOTE — FLOWSHEET NOTE
strength.     Goals  MET NOT MET ON-  GOING  Details   Date Addressed:            STG: To be met in 6 treatments            1. ? Pain: Decrease pain levels to 8/10 to ease ADL progression. []  []  []      2. ? ROM: Increase flexibility and AROM limitations throughout to equal bilat to reduce difficulty with ADLs  - improve keith hip mobility from moderate deficit to minimal or better. []  []  []      3. ? Strength: Increase core and hip strength to 5-/5 throughout to ease functional limitations and mobility  []  []  []      4. Independent with Home Exercise Programs with ability to demonstrate exercises without cueing for technique.  []  []  []        []  []  []        []  []  []      Date Addressed:           LTG: To be met in 12 treatments           1. Improve score on assessment tool CARMEN from 70% impairment to less than 50% impairment to demonstrate improved functional mobility []  []  []      2. Reduce pain levels to 5/10 or less with walking and taking care of his son. []  []  []      3. Pt will be able to sleep through the night with no interruption from back pain. []  []  []                      Pt. Education:  [x] Yes  [] No  [x] Reviewed Prior HEP/Ed  Method of Education: [x] Verbal  [x] Demo  [] Written  Comprehension of Education:  [x] Verbalizes understanding.  [x] Demonstrates understanding.  [] Needs review.  [] Demonstrates/verbalizes HEP/Ed previously given.     Access Code: [email or web address]  URL: [email or web address]  Date: 07/03/2025  Prepared by: Edmond Stoddard     Exercises  - Supine Lower Trunk Rotation  - 2 x daily - 5-7 x weekly - 10 reps  - Supine Piriformis Stretch with Foot on Ground  - 2 x daily - 5-7 x weekly - 3 reps - 10 hold  - Supine Figure 4 Piriformis Stretch  - 2 x daily - 5-7 x weekly - 3 reps - 10 hold  - Prone Press Up On Elbows  - 2 x daily - 5-7 x weekly - 2 min hold  Plan: [x] Continue current frequency toward long and short term goals.    [x] Specific Instructions for

## 2025-07-17 ENCOUNTER — HOSPITAL ENCOUNTER (OUTPATIENT)
Age: 65
Setting detail: THERAPIES SERIES
Discharge: HOME | End: 2025-07-17
Payer: MEDICARE

## 2025-07-17 PROCEDURE — 97140 MANUAL THERAPY 1/> REGIONS: CPT

## 2025-07-17 PROCEDURE — 97110 THERAPEUTIC EXERCISES: CPT

## 2025-07-17 NOTE — FLOWSHEET NOTE
[x] [facility]  Outpatient Rehabilitation &  Therapy  [address]  [phone]  [phone] [] [facility]  Outpatient Rehabilitation &  Therapy  [address]  P: [phone]  F: [phone] [] [facility]  Outpatient Rehabilitation &  Therapy  [address]  P: [phone]  F: [phone] [] [facility]  Outpatient Rehabilitation &  Therapy  [address]  [phone]  [phone] [] [facility]  Outpatient Rehabilitation &  Therapy  [address]   P: [phone]  F: [phone]  [] [facility]  Outpatient Rehabilitation &  Therapy  [address]  P: [phone]  F: [phone] [] [facility]  Outpatient Rehabilitation &  Therapy  [address]  P: [phone]  F: [phone] [] [facility]  Outpatient Rehabilitation &  Therapy  [address]  P: [phone]  F: [phone] [] [facility]  Outpatient Rehabilitation &  Therapy  [address]  P: [phone]  F: [phone]  [] [facility] Outpatient Rehabilitation &  Therapy  [address]  P: [phone]  F: [phone]     Physical Therapy Daily Treatment Note    Date:  2025  Patient Name:  Vic Nunez Sr.    :  1960  MRN: [id number]  Physician: Aba Parra M.D. Resident:    aMrleny Santillan MD              Insurance: [facility] MEDICARE DUAL ([place] D-SNP) Based on medical Necessity  Medical Diagnosis: M54.42, M54.41, G89.29 (ICD-10-CM) - Chronic bilateral low back pain with bilateral sciatica                  Rehab Codes: M54.41, M54.42, M25.651, M25.652, M62.81  Onset Date: 2025 Referral                    Next 's appt.: 2025 PCP  Visit# / total visits: 3/12; Progress note for Medicare patient due at visit 10     Cancels/No Shows:

## 2025-07-18 DIAGNOSIS — C44.92 SQUAMOUS CELL CARCINOMA METASTATIC TO BRONCHUS OF RIGHT LOWER LOBE (HCC): ICD-10-CM

## 2025-07-18 DIAGNOSIS — C78.01 SQUAMOUS CELL CARCINOMA METASTATIC TO BRONCHUS OF RIGHT LOWER LOBE (HCC): ICD-10-CM

## 2025-07-18 RX ORDER — HYDROCODONE BITARTRATE AND ACETAMINOPHEN 10; 325 MG/1; MG/1
TABLET ORAL
Qty: 56 TABLET | Refills: 0 | OUTPATIENT
Start: 2025-07-18

## 2025-07-21 ENCOUNTER — HOSPITAL ENCOUNTER (OUTPATIENT)
Dept: INFUSION THERAPY | Age: 65
End: 2025-07-21

## 2025-07-22 ENCOUNTER — OFFICE VISIT (OUTPATIENT)
Dept: PULMONOLOGY | Age: 65
End: 2025-07-22
Payer: MEDICARE

## 2025-07-22 VITALS
HEIGHT: 72 IN | HEART RATE: 74 BPM | OXYGEN SATURATION: 100 % | DIASTOLIC BLOOD PRESSURE: 81 MMHG | BODY MASS INDEX: 26.95 KG/M2 | RESPIRATION RATE: 14 BRPM | WEIGHT: 199 LBS | SYSTOLIC BLOOD PRESSURE: 121 MMHG

## 2025-07-22 DIAGNOSIS — R91.8 PULMONARY INFILTRATE: ICD-10-CM

## 2025-07-22 DIAGNOSIS — J44.9 CHRONIC OBSTRUCTIVE PULMONARY DISEASE, UNSPECIFIED COPD TYPE (HCC): ICD-10-CM

## 2025-07-22 DIAGNOSIS — C34.31 MALIGNANT NEOPLASM OF LOWER LOBE OF RIGHT LUNG (HCC): Primary | ICD-10-CM

## 2025-07-22 DIAGNOSIS — Z85.118 HISTORY OF LUNG CANCER: ICD-10-CM

## 2025-07-22 DIAGNOSIS — Z87.891 HISTORY OF SMOKING 30 OR MORE PACK YEARS: ICD-10-CM

## 2025-07-22 PROCEDURE — G8427 DOCREV CUR MEDS BY ELIG CLIN: HCPCS | Performed by: INTERNAL MEDICINE

## 2025-07-22 PROCEDURE — 99214 OFFICE O/P EST MOD 30 MIN: CPT | Performed by: INTERNAL MEDICINE

## 2025-07-22 PROCEDURE — 3017F COLORECTAL CA SCREEN DOC REV: CPT | Performed by: INTERNAL MEDICINE

## 2025-07-22 PROCEDURE — 1036F TOBACCO NON-USER: CPT | Performed by: INTERNAL MEDICINE

## 2025-07-22 PROCEDURE — G8417 CALC BMI ABV UP PARAM F/U: HCPCS | Performed by: INTERNAL MEDICINE

## 2025-07-22 PROCEDURE — 1159F MED LIST DOCD IN RCRD: CPT | Performed by: INTERNAL MEDICINE

## 2025-07-22 PROCEDURE — 1123F ACP DISCUSS/DSCN MKR DOCD: CPT | Performed by: INTERNAL MEDICINE

## 2025-07-22 PROCEDURE — 3023F SPIROM DOC REV: CPT | Performed by: INTERNAL MEDICINE

## 2025-07-22 RX ORDER — ALBUTEROL SULFATE 90 UG/1
2 INHALANT RESPIRATORY (INHALATION) EVERY 6 HOURS PRN
Qty: 18 G | Refills: 5 | Status: SHIPPED | OUTPATIENT
Start: 2025-07-22

## 2025-07-22 NOTE — PROGRESS NOTES
OUTPATIENT PULMONARY PROGRESS NOTE      Patient:  Vic Nunez Sr.  MRN: [id number]    Consulting Physician: Nakia Alexander MD  Reason for Consult: Abnormal CT scan/lung nodule/pulm infiltrate  Primacy Care Physician: Nakia Alexander MD    HISTORY OF PRESENT ILLNESS:     History of Present Illness      The patient is a 65-year-old male who presents for a follow-up of malignant neoplasm of the lower lobe of the right lung. He has a previous history of lung cancer, chronic obstructive pulmonary disease, a history of smoking (30 pack-years), and pulmonary infiltrate. His last office visit was on 04/15/2025, making this a 3-month follow-up appointment.    He has been under the care of oncologist Dr. Mcbride, he received chemotherapy and radiation therapy concurrently. His last chemotherapy session was was apparently in end of May 2025.  He is also followed by radiation oncology.  When he was seen last time by oncology CT scan was ordered, he has also undergone CT scans as part of his follow-up. His most recent consultation with the oncologist was on 05/31/2025. He has completed his current chemo radiation therapy and according to last oncology note the plan was to start patient on immunotherapy after CT scan of the chest.   - CT scan of the chest: 06/30/2025, Stable left upper lobe nodular scar like area, right upper lobe scar area present, and right lower lobe area of infiltrative nodular area almost the same with areas of bullous changes laterally and posteriorly. Another nodular area in the right middle lobe along the fissure has been stable.    He has requested a refill of his Symbicort inhaler, which he uses twice daily. He also uses albuterol as a rescue inhaler when he experiences wheezing or shortness of breath, but he has run out of this medication as well. He was supposed to receive a nebulizer, but it has not yet been provided. He quit smoking 6 to 8 weeks ago. His breathing is generally good, although

## 2025-07-23 ENCOUNTER — TELEPHONE (OUTPATIENT)
Dept: RADIATION ONCOLOGY | Age: 65
End: 2025-07-23

## 2025-07-23 ENCOUNTER — HOSPITAL ENCOUNTER (OUTPATIENT)
Dept: PAIN MANAGEMENT | Age: 65
Discharge: HOME | End: 2025-07-23
Payer: MEDICARE

## 2025-07-23 VITALS — BODY MASS INDEX: 26.95 KG/M2 | HEIGHT: 72 IN | WEIGHT: 199 LBS

## 2025-07-23 DIAGNOSIS — M47.817 LUMBOSACRAL SPONDYLOSIS WITHOUT MYELOPATHY: Primary | ICD-10-CM

## 2025-07-23 DIAGNOSIS — M51.369 DEGENERATION OF INTERVERTEBRAL DISC OF LUMBAR REGION, UNSPECIFIED WHETHER PAIN PRESENT: ICD-10-CM

## 2025-07-23 PROCEDURE — 99203 OFFICE O/P NEW LOW 30 MIN: CPT

## 2025-07-23 PROCEDURE — 99204 OFFICE O/P NEW MOD 45 MIN: CPT | Performed by: STUDENT IN AN ORGANIZED HEALTH CARE EDUCATION/TRAINING PROGRAM

## 2025-07-23 ASSESSMENT — PAIN SCALES - GENERAL: PAINLEVEL_OUTOF10: 10

## 2025-07-23 NOTE — TELEPHONE ENCOUNTER
Pt has a rad/onc follow up on 8/1/25, however, pt changed his MRI spine dates to 8/13/25. Therefore, I moved pts appt out to 8/22/25 after MRIs are completed. I called pt and advised of new date/time.

## 2025-07-23 NOTE — PROGRESS NOTES
disc of lumbar region, unspecified whether pain present  M51.369            ASSESSMENT:    Vic Nunez Sr. is a 65 y.o.adult who presents with chronic low back pain. To review, the patient has a history of lung cancer.  He has not had lumbar spine surgery.    The patient's history and physical examination are consistent with lumbosacral spondylosis.  The patient is scheduled for MRIs next month.    I offered several modalities including spine injections, however patient deferred.    Neurologically, it appears the patient has full strength and normal sensation. There is no evidence of radiculopathy or myelopathy on examination.  Red flags noted below.    At today's visit, patient is asking for opioid medication.  Unfortunately, the patient has been discharged from [facility] numerous times.  In 2012 he was discharged for cocaine in his urine drug screen.  In 2015 he was discharged for hostile behavior.  Due to the significant findings, I feel very uncomfortable prescribing controlled substances to the patient.  Therefore, medication management will not be part of my treatment plan.  Patient understood.    PLAN:  Medications:    For nonopioid therapy, the following medications were prescribed:    -Continue medication prescribed by primary care physician    Opioid therapy:  - Nonopioid care plan    Interventions:  - Offered spine injections, patient deferred    Imaging:  - Patient has thoracic and lumbar MRI in near future    Behavioral Therapies:  -Continue daily stretching and home exercise program    Referrals:  - Continue physical therapy    Follow-up Plan:  - As needed    Patient was offered intervention where appropriate.     Multi-modal Pain Therapy:  The patient was explicitly considered for multimodal and interdisciplinary therapy. Non-opioid and non-pharmacological opportunities to enhance analgesia and quality of life have been and will continue to be pursued.    Mark Solis,

## 2025-07-24 ENCOUNTER — HOSPITAL ENCOUNTER (OUTPATIENT)
Age: 65
Setting detail: THERAPIES SERIES
Discharge: HOME | End: 2025-07-24
Payer: MEDICARE

## 2025-07-24 NOTE — FLOWSHEET NOTE
[x] [facility]  Outpatient Rehabilitation &  Therapy  [address]  [phone]  [phone]     Therapy Cancel/No Show note    Date: 2025  Patient: Vic Nunez   : 1960  MRN: [id number]    Cancels/No Shows to date:     For today's appointment patient:    []  Cancelled    [] Rescheduled appointment    [x] No-show     Reason given by patient:    []  Patient ill    []  Conflicting appointment    [] No transportation      [] Conflict with work    [x] No reason given    [] Weather related    [] COVID-19    [x] Other:      Comments: pt arrived after 30 minutes late - unable to accommodate - rescheduled for 25        [x] Next appointment was confirmed previously     Electronically signed by: Florin Marinelli PTA

## 2025-07-28 ENCOUNTER — TELEPHONE (OUTPATIENT)
Dept: PALLATIVE CARE | Age: 65
End: 2025-07-28

## 2025-07-28 ENCOUNTER — OFFICE VISIT (OUTPATIENT)
Age: 65
End: 2025-07-28
Payer: MEDICARE

## 2025-07-28 ENCOUNTER — TELEPHONE (OUTPATIENT)
Age: 65
End: 2025-07-28

## 2025-07-28 ENCOUNTER — HOSPITAL ENCOUNTER (OUTPATIENT)
Dept: INFUSION THERAPY | Age: 65
Discharge: HOME | End: 2025-07-28
Payer: MEDICARE

## 2025-07-28 VITALS
SYSTOLIC BLOOD PRESSURE: 120 MMHG | TEMPERATURE: 97.2 F | BODY MASS INDEX: 26.24 KG/M2 | RESPIRATION RATE: 15 BRPM | DIASTOLIC BLOOD PRESSURE: 82 MMHG | WEIGHT: 193.5 LBS | OXYGEN SATURATION: 98 % | HEART RATE: 80 BPM

## 2025-07-28 DIAGNOSIS — C34.91 MALIGNANT NEOPLASM OF RIGHT LUNG, UNSPECIFIED PART OF LUNG (HCC): Primary | ICD-10-CM

## 2025-07-28 DIAGNOSIS — C44.92 SQUAMOUS CELL CARCINOMA METASTATIC TO BRONCHUS OF RIGHT LOWER LOBE (HCC): ICD-10-CM

## 2025-07-28 DIAGNOSIS — C78.01 SQUAMOUS CELL CARCINOMA METASTATIC TO BRONCHUS OF RIGHT LOWER LOBE (HCC): Primary | ICD-10-CM

## 2025-07-28 DIAGNOSIS — C44.92 SQUAMOUS CELL CARCINOMA METASTATIC TO BRONCHUS OF RIGHT LOWER LOBE (HCC): Primary | ICD-10-CM

## 2025-07-28 DIAGNOSIS — C78.01 SQUAMOUS CELL CARCINOMA METASTATIC TO BRONCHUS OF RIGHT LOWER LOBE (HCC): ICD-10-CM

## 2025-07-28 PROCEDURE — G8428 CUR MEDS NOT DOCUMENT: HCPCS | Performed by: INTERNAL MEDICINE

## 2025-07-28 PROCEDURE — 2500000003 HC RX 250 WO HCPCS: Performed by: INTERNAL MEDICINE

## 2025-07-28 PROCEDURE — 99215 OFFICE O/P EST HI 40 MIN: CPT | Performed by: INTERNAL MEDICINE

## 2025-07-28 PROCEDURE — 96523 IRRIG DRUG DELIVERY DEVICE: CPT

## 2025-07-28 PROCEDURE — 3017F COLORECTAL CA SCREEN DOC REV: CPT | Performed by: INTERNAL MEDICINE

## 2025-07-28 PROCEDURE — 1036F TOBACCO NON-USER: CPT | Performed by: INTERNAL MEDICINE

## 2025-07-28 PROCEDURE — 1123F ACP DISCUSS/DSCN MKR DOCD: CPT | Performed by: INTERNAL MEDICINE

## 2025-07-28 PROCEDURE — G8417 CALC BMI ABV UP PARAM F/U: HCPCS | Performed by: INTERNAL MEDICINE

## 2025-07-28 PROCEDURE — 1125F AMNT PAIN NOTED PAIN PRSNT: CPT | Performed by: INTERNAL MEDICINE

## 2025-07-28 PROCEDURE — 6360000002 HC RX W HCPCS: Performed by: INTERNAL MEDICINE

## 2025-07-28 RX ORDER — ONDANSETRON 2 MG/ML
8 INJECTION INTRAMUSCULAR; INTRAVENOUS
OUTPATIENT
Start: 2025-07-29

## 2025-07-28 RX ORDER — SODIUM CHLORIDE 0.9 % (FLUSH) 0.9 %
5-40 SYRINGE (ML) INJECTION PRN
OUTPATIENT
Start: 2025-07-29

## 2025-07-28 RX ORDER — HEPARIN 100 UNIT/ML
500 SYRINGE INTRAVENOUS PRN
OUTPATIENT
Start: 2025-07-28

## 2025-07-28 RX ORDER — EPINEPHRINE 1 MG/ML
0.3 INJECTION, SOLUTION, CONCENTRATE INTRAVENOUS PRN
OUTPATIENT
Start: 2025-07-29

## 2025-07-28 RX ORDER — SODIUM CHLORIDE 9 MG/ML
25 INJECTION, SOLUTION INTRAVENOUS PRN
OUTPATIENT
Start: 2025-07-28

## 2025-07-28 RX ORDER — ALBUTEROL SULFATE 90 UG/1
4 INHALANT RESPIRATORY (INHALATION) PRN
OUTPATIENT
Start: 2025-07-28

## 2025-07-28 RX ORDER — ONDANSETRON 2 MG/ML
8 INJECTION INTRAMUSCULAR; INTRAVENOUS
OUTPATIENT
Start: 2025-07-28

## 2025-07-28 RX ORDER — ACETAMINOPHEN 325 MG/1
650 TABLET ORAL
OUTPATIENT
Start: 2025-07-28

## 2025-07-28 RX ORDER — ACETAMINOPHEN 325 MG/1
650 TABLET ORAL
OUTPATIENT
Start: 2025-07-29

## 2025-07-28 RX ORDER — SODIUM CHLORIDE 0.9 % (FLUSH) 0.9 %
5-40 SYRINGE (ML) INJECTION PRN
OUTPATIENT
Start: 2025-07-28

## 2025-07-28 RX ORDER — HYDROCODONE BITARTRATE AND ACETAMINOPHEN 10; 325 MG/1; MG/1
1 TABLET ORAL EVERY 6 HOURS PRN
Qty: 56 TABLET | Refills: 0 | Status: SHIPPED | OUTPATIENT
Start: 2025-07-28 | End: 2025-08-11

## 2025-07-28 RX ORDER — SODIUM CHLORIDE 0.9 % (FLUSH) 0.9 %
5-40 SYRINGE (ML) INJECTION PRN
Status: DISCONTINUED | OUTPATIENT
Start: 2025-07-28 | End: 2025-07-29 | Stop reason: HOSPADM

## 2025-07-28 RX ORDER — EPINEPHRINE 1 MG/ML
0.3 INJECTION, SOLUTION, CONCENTRATE INTRAVENOUS PRN
OUTPATIENT
Start: 2025-07-28

## 2025-07-28 RX ORDER — FAMOTIDINE 10 MG/ML
20 INJECTION, SOLUTION INTRAVENOUS
OUTPATIENT
Start: 2025-07-29

## 2025-07-28 RX ORDER — HEPARIN SODIUM (PORCINE) LOCK FLUSH IV SOLN 100 UNIT/ML 100 UNIT/ML
500 SOLUTION INTRAVENOUS PRN
OUTPATIENT
Start: 2025-07-29

## 2025-07-28 RX ORDER — SODIUM CHLORIDE 9 MG/ML
5-250 INJECTION, SOLUTION INTRAVENOUS PRN
OUTPATIENT
Start: 2025-07-29

## 2025-07-28 RX ORDER — MEPERIDINE HYDROCHLORIDE 50 MG/ML
12.5 INJECTION INTRAMUSCULAR; INTRAVENOUS; SUBCUTANEOUS PRN
OUTPATIENT
Start: 2025-07-29

## 2025-07-28 RX ORDER — HEPARIN 100 UNIT/ML
500 SYRINGE INTRAVENOUS PRN
Status: DISCONTINUED | OUTPATIENT
Start: 2025-07-28 | End: 2025-07-29 | Stop reason: HOSPADM

## 2025-07-28 RX ORDER — ALBUTEROL SULFATE 90 UG/1
4 INHALANT RESPIRATORY (INHALATION) PRN
OUTPATIENT
Start: 2025-07-29

## 2025-07-28 RX ORDER — FAMOTIDINE 10 MG/ML
20 INJECTION, SOLUTION INTRAVENOUS
OUTPATIENT
Start: 2025-07-28

## 2025-07-28 RX ORDER — DIPHENHYDRAMINE HYDROCHLORIDE 50 MG/ML
50 INJECTION, SOLUTION INTRAMUSCULAR; INTRAVENOUS
OUTPATIENT
Start: 2025-07-28

## 2025-07-28 RX ORDER — HYDROCORTISONE SODIUM SUCCINATE 100 MG/2ML
100 INJECTION INTRAMUSCULAR; INTRAVENOUS
OUTPATIENT
Start: 2025-07-28

## 2025-07-28 RX ORDER — HYDROCORTISONE SODIUM SUCCINATE 100 MG/2ML
100 INJECTION INTRAMUSCULAR; INTRAVENOUS
OUTPATIENT
Start: 2025-07-29

## 2025-07-28 RX ORDER — DIPHENHYDRAMINE HYDROCHLORIDE 50 MG/ML
50 INJECTION, SOLUTION INTRAMUSCULAR; INTRAVENOUS
OUTPATIENT
Start: 2025-07-29

## 2025-07-28 RX ORDER — SODIUM CHLORIDE 9 MG/ML
INJECTION, SOLUTION INTRAVENOUS PRN
OUTPATIENT
Start: 2025-07-29

## 2025-07-28 RX ORDER — SODIUM CHLORIDE 9 MG/ML
INJECTION, SOLUTION INTRAVENOUS CONTINUOUS
OUTPATIENT
Start: 2025-07-28

## 2025-07-28 RX ADMIN — Medication 500 UNITS: at 15:29

## 2025-07-28 RX ADMIN — SODIUM CHLORIDE, PRESERVATIVE FREE 10 ML: 5 INJECTION INTRAVENOUS at 15:29

## 2025-07-28 NOTE — TELEPHONE ENCOUNTER
Called patient with reminder for upcoming [facility] appointment.  Reminded patient of appointment date,time and location.  Confirmed with Wilmer Arredondo Palliative Care Coordinator  Elisa ZHOUN, RN  [facility] [phone]/ [facility] [phone]/ [facility] [phone]

## 2025-07-28 NOTE — PROGRESS NOTES
_     Chief Complaint   Patient presents with    Follow-up     Review status of disease    Results     CT    Other     PT states he's having extreme back pain and would like narcotics       DIAGNOSIS:    Stage T2N1M0 right lung adenocarcinoma 2010  New diagnosis of right lower lobe lung squamous cell carcinoma February 2025.  Clinical stage T3 N0 M0    CURRENT THERAPY:    Status post resection followed by adjuvant chemotherapy in 2010..    Further workup and management of newly diagnosed right lung squamous cell carcinoma.    Plan for chemoradiation followed by immunotherapy treatment. Chemoradiation started 4/18/25. Last chemo treatment 5/30/25  Start durvalumab August 2025    BRIEF CASE HISTORY:          Vic Nunez is a very pleasant 65 y.o. adult with history of multiple co morbidities as listed.  He is referred for further management of recently diagnosed lung cancer.  Patient had history of l right lung adenocarcinoma in 2010.  He was treated with resection followed by 4 cycles of adjuvant chemotherapy treatment.  He was in remission since then.  He was last seen in January 2017.  He was lost for follow-up since then.  Recently patient had complaints of back pain.  He had evaluation with imaging and incidentally found to have right lower lobe lung lesion.  He had evaluation by pulmonary and he had bronchoscopy and biopsy.  Results positive for squamous cell carcinoma.  Clinically patient does not have significant respiratory symptoms.  He has occasional cough and sputum.  No hemoptysis.  No chest pain.  No fever or infections.  No headaches.    Patient quit smoking in 2024.    INTERIM HISTORY:   Evaluated today for further management of recently diagnosed squamous cell carcinoma of the right lung.  Continues to have body aches and pains.  No other symptoms at the present time. No cough. No hemoptysis.  Recovered from

## 2025-07-28 NOTE — PATIENT INSTRUCTIONS
Port flush today  Start Imfinzi after authorization at [facility]  RV at time of treatment at [facility]

## 2025-07-28 NOTE — TELEPHONE ENCOUNTER
Instructions   from Dr. Henri Oliveira MD    Port flush today  Start Imfinzi after authorization at [facility]  RV at time of treatment at [facility]      Patient had port flush done today. Patient AVS was sent over to [facility] for treatment/doctor visit. Patient was also given AVS.

## 2025-08-04 ENCOUNTER — HOSPITAL ENCOUNTER (OUTPATIENT)
Age: 65
Setting detail: THERAPIES SERIES
Discharge: HOME | End: 2025-08-04

## 2025-08-06 ENCOUNTER — INITIAL CONSULT (OUTPATIENT)
Dept: PALLATIVE CARE | Age: 65
End: 2025-08-06
Payer: MEDICARE

## 2025-08-06 ENCOUNTER — TELEPHONE (OUTPATIENT)
Age: 65
End: 2025-08-06

## 2025-08-06 VITALS
HEIGHT: 72 IN | RESPIRATION RATE: 16 BRPM | HEART RATE: 74 BPM | BODY MASS INDEX: 26.98 KG/M2 | WEIGHT: 199.2 LBS | SYSTOLIC BLOOD PRESSURE: 118 MMHG | DIASTOLIC BLOOD PRESSURE: 75 MMHG

## 2025-08-06 DIAGNOSIS — M47.817 LUMBOSACRAL SPONDYLOSIS WITHOUT MYELOPATHY: ICD-10-CM

## 2025-08-06 DIAGNOSIS — G89.29 CHRONIC RIGHT-SIDED LOW BACK PAIN WITH BILATERAL SCIATICA: ICD-10-CM

## 2025-08-06 DIAGNOSIS — M54.42 CHRONIC RIGHT-SIDED LOW BACK PAIN WITH BILATERAL SCIATICA: ICD-10-CM

## 2025-08-06 DIAGNOSIS — M54.41 CHRONIC RIGHT-SIDED LOW BACK PAIN WITH BILATERAL SCIATICA: ICD-10-CM

## 2025-08-06 DIAGNOSIS — C78.01 SQUAMOUS CELL CARCINOMA METASTATIC TO BRONCHUS OF RIGHT LOWER LOBE (HCC): ICD-10-CM

## 2025-08-06 DIAGNOSIS — C34.91 MALIGNANT NEOPLASM OF RIGHT LUNG, UNSPECIFIED PART OF LUNG (HCC): Primary | ICD-10-CM

## 2025-08-06 DIAGNOSIS — C44.92 SQUAMOUS CELL CARCINOMA METASTATIC TO BRONCHUS OF RIGHT LOWER LOBE (HCC): ICD-10-CM

## 2025-08-06 PROCEDURE — 1125F AMNT PAIN NOTED PAIN PRSNT: CPT

## 2025-08-06 PROCEDURE — 99204 OFFICE O/P NEW MOD 45 MIN: CPT

## 2025-08-06 PROCEDURE — 1036F TOBACCO NON-USER: CPT

## 2025-08-06 PROCEDURE — G8428 CUR MEDS NOT DOCUMENT: HCPCS

## 2025-08-06 PROCEDURE — 3017F COLORECTAL CA SCREEN DOC REV: CPT

## 2025-08-06 PROCEDURE — 1123F ACP DISCUSS/DSCN MKR DOCD: CPT

## 2025-08-06 PROCEDURE — G8417 CALC BMI ABV UP PARAM F/U: HCPCS

## 2025-08-08 ENCOUNTER — OFFICE VISIT (OUTPATIENT)
Age: 65
End: 2025-08-08
Payer: MEDICARE

## 2025-08-08 VITALS
DIASTOLIC BLOOD PRESSURE: 87 MMHG | SYSTOLIC BLOOD PRESSURE: 137 MMHG | HEART RATE: 72 BPM | BODY MASS INDEX: 26.45 KG/M2 | WEIGHT: 195 LBS | TEMPERATURE: 97.6 F

## 2025-08-08 DIAGNOSIS — M51.26 DISPLACEMENT OF LUMBAR INTERVERTEBRAL DISC WITHOUT MYELOPATHY: Chronic | ICD-10-CM

## 2025-08-08 DIAGNOSIS — R73.03 PREDIABETES: ICD-10-CM

## 2025-08-08 DIAGNOSIS — G89.29 CHRONIC BILATERAL LOW BACK PAIN WITH BILATERAL SCIATICA: Primary | ICD-10-CM

## 2025-08-08 DIAGNOSIS — E78.2 MIXED HYPERLIPIDEMIA: ICD-10-CM

## 2025-08-08 DIAGNOSIS — Z13.220 SCREENING FOR HYPERLIPIDEMIA: ICD-10-CM

## 2025-08-08 DIAGNOSIS — E11.65 TYPE 2 DIABETES MELLITUS WITH HYPERGLYCEMIA, WITHOUT LONG-TERM CURRENT USE OF INSULIN (HCC): ICD-10-CM

## 2025-08-08 DIAGNOSIS — M54.41 CHRONIC BILATERAL LOW BACK PAIN WITH BILATERAL SCIATICA: Primary | ICD-10-CM

## 2025-08-08 DIAGNOSIS — M54.42 CHRONIC BILATERAL LOW BACK PAIN WITH BILATERAL SCIATICA: Primary | ICD-10-CM

## 2025-08-08 LAB — HBA1C MFR BLD: 5.6 %

## 2025-08-08 PROCEDURE — 99213 OFFICE O/P EST LOW 20 MIN: CPT

## 2025-08-08 PROCEDURE — G8427 DOCREV CUR MEDS BY ELIG CLIN: HCPCS

## 2025-08-08 PROCEDURE — 1159F MED LIST DOCD IN RCRD: CPT

## 2025-08-08 PROCEDURE — 1125F AMNT PAIN NOTED PAIN PRSNT: CPT

## 2025-08-08 PROCEDURE — 2022F DILAT RTA XM EVC RTNOPTHY: CPT

## 2025-08-08 PROCEDURE — 1123F ACP DISCUSS/DSCN MKR DOCD: CPT

## 2025-08-08 PROCEDURE — G8417 CALC BMI ABV UP PARAM F/U: HCPCS

## 2025-08-08 PROCEDURE — 3017F COLORECTAL CA SCREEN DOC REV: CPT

## 2025-08-08 PROCEDURE — 83036 HEMOGLOBIN GLYCOSYLATED A1C: CPT

## 2025-08-08 PROCEDURE — 3044F HG A1C LEVEL LT 7.0%: CPT

## 2025-08-08 PROCEDURE — 1036F TOBACCO NON-USER: CPT

## 2025-08-08 RX ORDER — METFORMIN HYDROCHLORIDE 500 MG/1
500 TABLET, EXTENDED RELEASE ORAL
Qty: 90 TABLET | Refills: 0 | Status: SHIPPED | OUTPATIENT
Start: 2025-08-08

## 2025-08-08 RX ORDER — DULOXETIN HYDROCHLORIDE 60 MG/1
60 CAPSULE, DELAYED RELEASE ORAL DAILY
Qty: 30 CAPSULE | Refills: 3 | Status: SHIPPED | OUTPATIENT
Start: 2025-08-08

## 2025-08-08 RX ORDER — LIDOCAINE 50 MG/G
1 PATCH TOPICAL DAILY
Qty: 30 PATCH | Refills: 0 | Status: SHIPPED | OUTPATIENT
Start: 2025-08-08 | End: 2025-09-07

## 2025-08-08 RX ORDER — BENZONATATE 100 MG/1
100 CAPSULE ORAL 3 TIMES DAILY PRN
Qty: 30 CAPSULE | Refills: 1 | Status: SHIPPED | OUTPATIENT
Start: 2025-08-08

## 2025-08-08 RX ORDER — SUCRALFATE 1 G/1
1 TABLET ORAL 4 TIMES DAILY
Qty: 120 TABLET | Refills: 0 | Status: SHIPPED | OUTPATIENT
Start: 2025-08-08

## 2025-08-08 RX ORDER — DULOXETIN HYDROCHLORIDE 30 MG/1
30 CAPSULE, DELAYED RELEASE ORAL DAILY
Qty: 90 CAPSULE | Refills: 0 | Status: CANCELLED | OUTPATIENT
Start: 2025-08-08

## 2025-08-08 RX ORDER — ATORVASTATIN CALCIUM 20 MG/1
20 TABLET, FILM COATED ORAL DAILY
Qty: 100 TABLET | Refills: 0 | Status: SHIPPED | OUTPATIENT
Start: 2025-08-08

## 2025-08-09 ENCOUNTER — HOSPITAL ENCOUNTER (EMERGENCY)
Age: 65
Discharge: HOME | End: 2025-08-09
Attending: EMERGENCY MEDICINE
Payer: MEDICARE

## 2025-08-09 ENCOUNTER — APPOINTMENT (OUTPATIENT)
Dept: GENERAL RADIOLOGY | Age: 65
End: 2025-08-09
Payer: MEDICARE

## 2025-08-09 ENCOUNTER — APPOINTMENT (OUTPATIENT)
Dept: CT IMAGING | Age: 65
End: 2025-08-09
Payer: MEDICARE

## 2025-08-09 VITALS
OXYGEN SATURATION: 97 % | DIASTOLIC BLOOD PRESSURE: 74 MMHG | TEMPERATURE: 97.9 F | HEART RATE: 64 BPM | RESPIRATION RATE: 18 BRPM | SYSTOLIC BLOOD PRESSURE: 117 MMHG

## 2025-08-09 DIAGNOSIS — C78.01 SQUAMOUS CELL CARCINOMA METASTATIC TO BRONCHUS OF RIGHT LOWER LOBE (HCC): ICD-10-CM

## 2025-08-09 DIAGNOSIS — C44.92 SQUAMOUS CELL CARCINOMA METASTATIC TO BRONCHUS OF RIGHT LOWER LOBE (HCC): ICD-10-CM

## 2025-08-09 DIAGNOSIS — R07.89 OTHER CHEST PAIN: Primary | ICD-10-CM

## 2025-08-09 LAB
ALBUMIN SERPL-MCNC: 4.2 G/DL (ref 3.5–5.2)
ALBUMIN/GLOB SERPL: 1.4 {RATIO} (ref 1–2.5)
ALP SERPL-CCNC: 91 U/L (ref 40–129)
ALT SERPL-CCNC: 19 U/L (ref 10–50)
ANION GAP SERPL CALCULATED.3IONS-SCNC: 12 MMOL/L (ref 9–16)
AST SERPL-CCNC: 24 U/L (ref 10–50)
BASOPHILS # BLD: 0.03 K/UL (ref 0–0.2)
BASOPHILS NFR BLD: 1 % (ref 0–2)
BILIRUB SERPL-MCNC: 0.8 MG/DL (ref 0–1.2)
BUN SERPL-MCNC: 14 MG/DL (ref 8–23)
CALCIUM SERPL-MCNC: 9.8 MG/DL (ref 8.6–10.4)
CHLORIDE SERPL-SCNC: 103 MMOL/L (ref 98–107)
CO2 SERPL-SCNC: 24 MMOL/L (ref 20–31)
CREAT SERPL-MCNC: 1.2 MG/DL (ref 0.7–1.2)
D DIMER PPP FEU-MCNC: 1.2 UG/ML FEU (ref 0–0.57)
EOSINOPHIL # BLD: 0.05 K/UL (ref 0–0.44)
EOSINOPHILS RELATIVE PERCENT: 1 % (ref 1–4)
ERYTHROCYTE [DISTWIDTH] IN BLOOD BY AUTOMATED COUNT: 13.8 % (ref 11.8–14.4)
GFR, ESTIMATED: 66 ML/MIN/1.73M2
GLUCOSE SERPL-MCNC: 116 MG/DL (ref 74–99)
HCT VFR BLD AUTO: 43.7 % (ref 40.7–50.3)
HGB BLD-MCNC: 14.6 G/DL (ref 13–17)
IMM GRANULOCYTES # BLD AUTO: 0.05 K/UL (ref 0–0.3)
IMM GRANULOCYTES NFR BLD: 1 %
LYMPHOCYTES NFR BLD: 0.98 K/UL (ref 1.1–3.7)
LYMPHOCYTES RELATIVE PERCENT: 20 % (ref 24–43)
MCH RBC QN AUTO: 31.5 PG (ref 25.2–33.5)
MCHC RBC AUTO-ENTMCNC: 33.4 G/DL (ref 28.4–34.8)
MCV RBC AUTO: 94.2 FL (ref 82.6–102.9)
MONOCYTES NFR BLD: 0.53 K/UL (ref 0.1–1.2)
MONOCYTES NFR BLD: 11 % (ref 3–12)
NEUTROPHILS NFR BLD: 66 % (ref 36–65)
NEUTS SEG NFR BLD: 3.31 K/UL (ref 1.5–8.1)
NRBC BLD-RTO: 0 PER 100 WBC
PLATELET # BLD AUTO: 255 K/UL (ref 138–453)
PMV BLD AUTO: 8.5 FL (ref 8.1–13.5)
POTASSIUM SERPL-SCNC: 3.9 MMOL/L (ref 3.7–5.3)
PROT SERPL-MCNC: 7.1 G/DL (ref 6.6–8.7)
RBC # BLD AUTO: 4.64 M/UL (ref 4.21–5.77)
SODIUM SERPL-SCNC: 139 MMOL/L (ref 136–145)
TROPONIN I SERPL HS-MCNC: 6 NG/L (ref 0–22)
TROPONIN I SERPL HS-MCNC: 7 NG/L (ref 0–22)
WBC OTHER # BLD: 5 K/UL (ref 3.5–11.3)

## 2025-08-09 PROCEDURE — 71045 X-RAY EXAM CHEST 1 VIEW: CPT

## 2025-08-09 PROCEDURE — 6360000004 HC RX CONTRAST MEDICATION

## 2025-08-09 PROCEDURE — 99285 EMERGENCY DEPT VISIT HI MDM: CPT

## 2025-08-09 PROCEDURE — 80053 COMPREHEN METABOLIC PANEL: CPT

## 2025-08-09 PROCEDURE — 85379 FIBRIN DEGRADATION QUANT: CPT

## 2025-08-09 PROCEDURE — 84484 ASSAY OF TROPONIN QUANT: CPT

## 2025-08-09 PROCEDURE — 85025 COMPLETE CBC W/AUTO DIFF WBC: CPT

## 2025-08-09 PROCEDURE — 71260 CT THORAX DX C+: CPT

## 2025-08-09 PROCEDURE — 6360000002 HC RX W HCPCS: Performed by: EMERGENCY MEDICINE

## 2025-08-09 PROCEDURE — 96375 TX/PRO/DX INJ NEW DRUG ADDON: CPT

## 2025-08-09 PROCEDURE — 96374 THER/PROPH/DIAG INJ IV PUSH: CPT

## 2025-08-09 PROCEDURE — 6360000002 HC RX W HCPCS

## 2025-08-09 PROCEDURE — 93005 ELECTROCARDIOGRAM TRACING: CPT | Performed by: EMERGENCY MEDICINE

## 2025-08-09 RX ORDER — FENTANYL CITRATE 50 UG/ML
50 INJECTION, SOLUTION INTRAMUSCULAR; INTRAVENOUS ONCE
Status: COMPLETED | OUTPATIENT
Start: 2025-08-09 | End: 2025-08-09

## 2025-08-09 RX ORDER — MORPHINE SULFATE 4 MG/ML
4 INJECTION INTRAVENOUS ONCE
Status: COMPLETED | OUTPATIENT
Start: 2025-08-09 | End: 2025-08-09

## 2025-08-09 RX ORDER — HYDROCODONE BITARTRATE AND ACETAMINOPHEN 10; 325 MG/1; MG/1
1 TABLET ORAL EVERY 6 HOURS PRN
Qty: 12 TABLET | Refills: 0 | Status: SHIPPED | OUTPATIENT
Start: 2025-08-09 | End: 2025-08-12

## 2025-08-09 RX ORDER — IOPAMIDOL 755 MG/ML
75 INJECTION, SOLUTION INTRAVASCULAR
Status: COMPLETED | OUTPATIENT
Start: 2025-08-09 | End: 2025-08-09

## 2025-08-09 RX ADMIN — IOPAMIDOL 75 ML: 755 INJECTION, SOLUTION INTRAVENOUS at 17:54

## 2025-08-09 RX ADMIN — MORPHINE SULFATE 4 MG: 4 INJECTION INTRAVENOUS at 20:49

## 2025-08-09 RX ADMIN — FENTANYL CITRATE 50 MCG: 50 INJECTION INTRAMUSCULAR; INTRAVENOUS at 16:17

## 2025-08-09 ASSESSMENT — PAIN DESCRIPTION - ORIENTATION: ORIENTATION: MID

## 2025-08-09 ASSESSMENT — PAIN DESCRIPTION - PAIN TYPE: TYPE: ACUTE PAIN

## 2025-08-09 ASSESSMENT — PAIN SCALES - GENERAL: PAINLEVEL_OUTOF10: 10

## 2025-08-09 ASSESSMENT — PAIN - FUNCTIONAL ASSESSMENT
PAIN_FUNCTIONAL_ASSESSMENT: ACTIVITIES ARE NOT PREVENTED
PAIN_FUNCTIONAL_ASSESSMENT: 0-10

## 2025-08-09 ASSESSMENT — PAIN DESCRIPTION - DESCRIPTORS: DESCRIPTORS: ACHING

## 2025-08-09 ASSESSMENT — PAIN DESCRIPTION - LOCATION: LOCATION: CHEST

## 2025-08-11 ENCOUNTER — HOSPITAL ENCOUNTER (OUTPATIENT)
Facility: MEDICAL CENTER | Age: 65
End: 2025-08-11
Payer: MEDICARE

## 2025-08-11 LAB
EKG ATRIAL RATE: 75 BPM
EKG P AXIS: 72 DEGREES
EKG P-R INTERVAL: 134 MS
EKG Q-T INTERVAL: 364 MS
EKG QRS DURATION: 82 MS
EKG QTC CALCULATION (BAZETT): 406 MS
EKG R AXIS: 44 DEGREES
EKG T AXIS: 50 DEGREES
EKG VENTRICULAR RATE: 75 BPM

## 2025-08-13 ENCOUNTER — HOSPITAL ENCOUNTER (OUTPATIENT)
Dept: MRI IMAGING | Age: 65
Discharge: HOME | End: 2025-08-15
Attending: RADIOLOGY
Payer: MEDICARE

## 2025-08-13 ENCOUNTER — HOSPITAL ENCOUNTER (OUTPATIENT)
Dept: INFUSION THERAPY | Age: 65
Discharge: HOME | End: 2025-08-13
Payer: MEDICARE

## 2025-08-13 DIAGNOSIS — C44.92 SQUAMOUS CELL CARCINOMA METASTATIC TO BRONCHUS OF RIGHT LOWER LOBE (HCC): ICD-10-CM

## 2025-08-13 DIAGNOSIS — C34.91 MALIGNANT NEOPLASM OF RIGHT LUNG, UNSPECIFIED PART OF LUNG (HCC): Primary | ICD-10-CM

## 2025-08-13 DIAGNOSIS — C78.01 SQUAMOUS CELL CARCINOMA METASTATIC TO BRONCHUS OF RIGHT LOWER LOBE (HCC): ICD-10-CM

## 2025-08-13 PROCEDURE — 6360000004 HC RX CONTRAST MEDICATION: Performed by: RADIOLOGY

## 2025-08-13 PROCEDURE — 2500000003 HC RX 250 WO HCPCS: Performed by: INTERNAL MEDICINE

## 2025-08-13 PROCEDURE — 6360000002 HC RX W HCPCS: Performed by: INTERNAL MEDICINE

## 2025-08-13 PROCEDURE — A9579 GAD-BASE MR CONTRAST NOS,1ML: HCPCS | Performed by: RADIOLOGY

## 2025-08-13 PROCEDURE — 72158 MRI LUMBAR SPINE W/O & W/DYE: CPT

## 2025-08-13 PROCEDURE — 72157 MRI CHEST SPINE W/O & W/DYE: CPT

## 2025-08-13 PROCEDURE — 96523 IRRIG DRUG DELIVERY DEVICE: CPT

## 2025-08-13 PROCEDURE — 2500000003 HC RX 250 WO HCPCS: Performed by: RADIOLOGY

## 2025-08-13 RX ORDER — SODIUM CHLORIDE 0.9 % (FLUSH) 0.9 %
5-40 SYRINGE (ML) INJECTION PRN
Status: DISCONTINUED | OUTPATIENT
Start: 2025-08-13 | End: 2025-08-14 | Stop reason: HOSPADM

## 2025-08-13 RX ORDER — GADOTERIDOL 279.3 MG/ML
18 INJECTION INTRAVENOUS
Status: COMPLETED | OUTPATIENT
Start: 2025-08-13 | End: 2025-08-13

## 2025-08-13 RX ORDER — SODIUM CHLORIDE 0.9 % (FLUSH) 0.9 %
5-40 SYRINGE (ML) INJECTION PRN
OUTPATIENT
Start: 2025-08-13

## 2025-08-13 RX ORDER — HYDROCORTISONE SODIUM SUCCINATE 100 MG/2ML
100 INJECTION INTRAMUSCULAR; INTRAVENOUS
OUTPATIENT
Start: 2025-08-13

## 2025-08-13 RX ORDER — SODIUM CHLORIDE 9 MG/ML
25 INJECTION, SOLUTION INTRAVENOUS PRN
OUTPATIENT
Start: 2025-08-13

## 2025-08-13 RX ORDER — ACETAMINOPHEN 325 MG/1
650 TABLET ORAL
OUTPATIENT
Start: 2025-08-13

## 2025-08-13 RX ORDER — ALBUTEROL SULFATE 90 UG/1
4 INHALANT RESPIRATORY (INHALATION) PRN
OUTPATIENT
Start: 2025-08-13

## 2025-08-13 RX ORDER — FAMOTIDINE 10 MG/ML
20 INJECTION, SOLUTION INTRAVENOUS
OUTPATIENT
Start: 2025-08-13

## 2025-08-13 RX ORDER — HEPARIN 100 UNIT/ML
500 SYRINGE INTRAVENOUS PRN
OUTPATIENT
Start: 2025-08-13

## 2025-08-13 RX ORDER — ONDANSETRON 2 MG/ML
8 INJECTION INTRAMUSCULAR; INTRAVENOUS
OUTPATIENT
Start: 2025-08-13

## 2025-08-13 RX ORDER — SODIUM CHLORIDE 0.9 % (FLUSH) 0.9 %
10 SYRINGE (ML) INJECTION PRN
Status: DISCONTINUED | OUTPATIENT
Start: 2025-08-13 | End: 2025-08-16 | Stop reason: HOSPADM

## 2025-08-13 RX ORDER — SODIUM CHLORIDE 9 MG/ML
INJECTION, SOLUTION INTRAVENOUS CONTINUOUS
OUTPATIENT
Start: 2025-08-13

## 2025-08-13 RX ORDER — DIPHENHYDRAMINE HYDROCHLORIDE 50 MG/ML
50 INJECTION, SOLUTION INTRAMUSCULAR; INTRAVENOUS
OUTPATIENT
Start: 2025-08-13

## 2025-08-13 RX ORDER — EPINEPHRINE 1 MG/ML
0.3 INJECTION, SOLUTION, CONCENTRATE INTRAVENOUS PRN
OUTPATIENT
Start: 2025-08-13

## 2025-08-13 RX ORDER — HEPARIN 100 UNIT/ML
500 SYRINGE INTRAVENOUS PRN
Status: DISCONTINUED | OUTPATIENT
Start: 2025-08-13 | End: 2025-08-14 | Stop reason: HOSPADM

## 2025-08-13 RX ADMIN — GADOTERIDOL 18 ML: 279.3 INJECTION, SOLUTION INTRAVENOUS at 14:10

## 2025-08-13 RX ADMIN — SODIUM CHLORIDE, PRESERVATIVE FREE 10 ML: 5 INJECTION INTRAVENOUS at 15:12

## 2025-08-13 RX ADMIN — SODIUM CHLORIDE, PRESERVATIVE FREE 10 ML: 5 INJECTION INTRAVENOUS at 13:48

## 2025-08-13 RX ADMIN — SODIUM CHLORIDE, PRESERVATIVE FREE 10 ML: 5 INJECTION INTRAVENOUS at 14:10

## 2025-08-13 RX ADMIN — HEPARIN 500 UNITS: 100 SYRINGE at 15:12

## 2025-08-19 ENCOUNTER — OFFICE VISIT (OUTPATIENT)
Age: 65
End: 2025-08-19
Payer: MEDICARE

## 2025-08-19 ENCOUNTER — TELEPHONE (OUTPATIENT)
Age: 65
End: 2025-08-19

## 2025-08-19 ENCOUNTER — HOSPITAL ENCOUNTER (OUTPATIENT)
Dept: INFUSION THERAPY | Facility: MEDICAL CENTER | Age: 65
Discharge: HOME | End: 2025-08-19
Payer: MEDICARE

## 2025-08-19 VITALS
WEIGHT: 193.3 LBS | HEIGHT: 72 IN | SYSTOLIC BLOOD PRESSURE: 134 MMHG | TEMPERATURE: 97.9 F | BODY MASS INDEX: 26.18 KG/M2 | HEART RATE: 73 BPM | DIASTOLIC BLOOD PRESSURE: 73 MMHG | RESPIRATION RATE: 18 BRPM

## 2025-08-19 DIAGNOSIS — C44.92 SQUAMOUS CELL CARCINOMA METASTATIC TO BRONCHUS OF RIGHT LOWER LOBE (HCC): Primary | ICD-10-CM

## 2025-08-19 DIAGNOSIS — C78.01 SQUAMOUS CELL CARCINOMA METASTATIC TO BRONCHUS OF RIGHT LOWER LOBE (HCC): Primary | ICD-10-CM

## 2025-08-19 DIAGNOSIS — Z79.899 LONG TERM USE OF DRUG: ICD-10-CM

## 2025-08-19 LAB
ALBUMIN SERPL-MCNC: 3.9 G/DL (ref 3.5–5.2)
ALBUMIN/GLOB SERPL: 1.5 {RATIO} (ref 1–2.5)
ALP SERPL-CCNC: 84 U/L (ref 40–129)
ALT SERPL-CCNC: 25 U/L (ref 10–50)
ANION GAP SERPL CALCULATED.3IONS-SCNC: 13 MMOL/L (ref 9–16)
AST SERPL-CCNC: 36 U/L (ref 10–50)
BASOPHILS # BLD: 0.04 K/UL (ref 0–0.2)
BASOPHILS NFR BLD: 1 % (ref 0–2)
BILIRUB SERPL-MCNC: 0.8 MG/DL (ref 0–1.2)
BUN SERPL-MCNC: 13 MG/DL (ref 8–23)
CALCIUM SERPL-MCNC: 9.3 MG/DL (ref 8.8–10.2)
CHLORIDE SERPL-SCNC: 106 MMOL/L (ref 98–107)
CO2 SERPL-SCNC: 23 MMOL/L (ref 20–31)
CREAT SERPL-MCNC: 1 MG/DL (ref 0.7–1.2)
EOSINOPHIL # BLD: 0.07 K/UL (ref 0–0.44)
EOSINOPHILS RELATIVE PERCENT: 1 % (ref 1–4)
ERYTHROCYTE [DISTWIDTH] IN BLOOD BY AUTOMATED COUNT: 13.3 % (ref 11.8–14.4)
GFR, ESTIMATED: 83 ML/MIN/1.73M2
GLUCOSE SERPL-MCNC: 102 MG/DL (ref 82–115)
HCT VFR BLD AUTO: 39.1 % (ref 40.7–50.3)
HGB BLD-MCNC: 13.3 G/DL (ref 13–17)
IMM GRANULOCYTES # BLD AUTO: 0.03 K/UL (ref 0–0.3)
IMM GRANULOCYTES NFR BLD: 1 %
LYMPHOCYTES NFR BLD: 0.98 K/UL (ref 1.1–3.7)
LYMPHOCYTES RELATIVE PERCENT: 19 % (ref 24–43)
MCH RBC QN AUTO: 32.2 PG (ref 25.2–33.5)
MCHC RBC AUTO-ENTMCNC: 34 G/DL (ref 28.4–34.8)
MCV RBC AUTO: 94.7 FL (ref 82.6–102.9)
MONOCYTES NFR BLD: 0.63 K/UL (ref 0.1–1.2)
MONOCYTES NFR BLD: 12 % (ref 3–12)
NEUTROPHILS NFR BLD: 66 % (ref 36–65)
NEUTS SEG NFR BLD: 3.46 K/UL (ref 1.5–8.1)
NRBC BLD-RTO: 0 PER 100 WBC
PLATELET # BLD AUTO: 224 K/UL (ref 138–453)
PMV BLD AUTO: 8.8 FL (ref 8.1–13.5)
POTASSIUM SERPL-SCNC: 3.7 MMOL/L (ref 3.7–5.3)
PROT SERPL-MCNC: 6.5 G/DL (ref 6.6–8.7)
RBC # BLD AUTO: 4.13 M/UL (ref 4.21–5.77)
SODIUM SERPL-SCNC: 142 MMOL/L (ref 136–145)
TSH SERPL DL<=0.05 MIU/L-ACNC: 1.5 UIU/ML (ref 0.27–4.2)
WBC OTHER # BLD: 5.2 K/UL (ref 3.5–11.3)

## 2025-08-19 PROCEDURE — 80053 COMPREHEN METABOLIC PANEL: CPT

## 2025-08-19 PROCEDURE — 2500000003 HC RX 250 WO HCPCS: Performed by: INTERNAL MEDICINE

## 2025-08-19 PROCEDURE — 2580000003 HC RX 258: Performed by: INTERNAL MEDICINE

## 2025-08-19 PROCEDURE — 99214 OFFICE O/P EST MOD 30 MIN: CPT | Performed by: INTERNAL MEDICINE

## 2025-08-19 PROCEDURE — 1125F AMNT PAIN NOTED PAIN PRSNT: CPT | Performed by: INTERNAL MEDICINE

## 2025-08-19 PROCEDURE — 1123F ACP DISCUSS/DSCN MKR DOCD: CPT | Performed by: INTERNAL MEDICINE

## 2025-08-19 PROCEDURE — 96413 CHEMO IV INFUSION 1 HR: CPT

## 2025-08-19 PROCEDURE — 1036F TOBACCO NON-USER: CPT | Performed by: INTERNAL MEDICINE

## 2025-08-19 PROCEDURE — 84443 ASSAY THYROID STIM HORMONE: CPT

## 2025-08-19 PROCEDURE — G8427 DOCREV CUR MEDS BY ELIG CLIN: HCPCS | Performed by: INTERNAL MEDICINE

## 2025-08-19 PROCEDURE — G8417 CALC BMI ABV UP PARAM F/U: HCPCS | Performed by: INTERNAL MEDICINE

## 2025-08-19 PROCEDURE — 3017F COLORECTAL CA SCREEN DOC REV: CPT | Performed by: INTERNAL MEDICINE

## 2025-08-19 PROCEDURE — 6360000002 HC RX W HCPCS: Performed by: INTERNAL MEDICINE

## 2025-08-19 PROCEDURE — 85025 COMPLETE CBC W/AUTO DIFF WBC: CPT

## 2025-08-19 RX ORDER — SODIUM CHLORIDE 9 MG/ML
5-250 INJECTION, SOLUTION INTRAVENOUS PRN
OUTPATIENT
Start: 2025-09-16

## 2025-08-19 RX ORDER — ACETAMINOPHEN 325 MG/1
650 TABLET ORAL
OUTPATIENT
Start: 2025-09-16

## 2025-08-19 RX ORDER — FAMOTIDINE 10 MG/ML
20 INJECTION, SOLUTION INTRAVENOUS
OUTPATIENT
Start: 2025-09-16

## 2025-08-19 RX ORDER — SODIUM CHLORIDE 9 MG/ML
5-250 INJECTION, SOLUTION INTRAVENOUS PRN
Status: DISCONTINUED | OUTPATIENT
Start: 2025-08-19 | End: 2025-08-20 | Stop reason: HOSPADM

## 2025-08-19 RX ORDER — HEPARIN SODIUM (PORCINE) LOCK FLUSH IV SOLN 100 UNIT/ML 100 UNIT/ML
500 SOLUTION INTRAVENOUS PRN
OUTPATIENT
Start: 2025-09-16

## 2025-08-19 RX ORDER — SODIUM CHLORIDE 0.9 % (FLUSH) 0.9 %
5-40 SYRINGE (ML) INJECTION PRN
OUTPATIENT
Start: 2025-09-16

## 2025-08-19 RX ORDER — ONDANSETRON 2 MG/ML
8 INJECTION INTRAMUSCULAR; INTRAVENOUS
OUTPATIENT
Start: 2025-09-16

## 2025-08-19 RX ORDER — MEPERIDINE HYDROCHLORIDE 50 MG/ML
12.5 INJECTION INTRAMUSCULAR; INTRAVENOUS; SUBCUTANEOUS PRN
OUTPATIENT
Start: 2025-09-16

## 2025-08-19 RX ORDER — SODIUM CHLORIDE 0.9 % (FLUSH) 0.9 %
5-40 SYRINGE (ML) INJECTION PRN
Status: DISCONTINUED | OUTPATIENT
Start: 2025-08-19 | End: 2025-08-20 | Stop reason: HOSPADM

## 2025-08-19 RX ORDER — HEPARIN 100 UNIT/ML
500 SYRINGE INTRAVENOUS PRN
Status: DISCONTINUED | OUTPATIENT
Start: 2025-08-19 | End: 2025-08-20 | Stop reason: HOSPADM

## 2025-08-19 RX ORDER — DIPHENHYDRAMINE HYDROCHLORIDE 50 MG/ML
50 INJECTION, SOLUTION INTRAMUSCULAR; INTRAVENOUS
OUTPATIENT
Start: 2025-09-16

## 2025-08-19 RX ORDER — SODIUM CHLORIDE 9 MG/ML
INJECTION, SOLUTION INTRAVENOUS PRN
OUTPATIENT
Start: 2025-09-16

## 2025-08-19 RX ORDER — HYDROCORTISONE SODIUM SUCCINATE 100 MG/2ML
100 INJECTION INTRAMUSCULAR; INTRAVENOUS
OUTPATIENT
Start: 2025-09-16

## 2025-08-19 RX ORDER — ALBUTEROL SULFATE 90 UG/1
4 INHALANT RESPIRATORY (INHALATION) PRN
OUTPATIENT
Start: 2025-09-16

## 2025-08-19 RX ORDER — EPINEPHRINE 1 MG/ML
0.3 INJECTION, SOLUTION, CONCENTRATE INTRAVENOUS PRN
OUTPATIENT
Start: 2025-09-16

## 2025-08-19 RX ADMIN — HEPARIN 500 UNITS: 100 SYRINGE at 14:00

## 2025-08-19 RX ADMIN — SODIUM CHLORIDE, PRESERVATIVE FREE 10 ML: 5 INJECTION INTRAVENOUS at 14:00

## 2025-08-19 RX ADMIN — SODIUM CHLORIDE 50 ML/HR: 0.9 INJECTION, SOLUTION INTRAVENOUS at 11:42

## 2025-08-19 RX ADMIN — DURVALUMAB 1500 MG: 500 INJECTION, SOLUTION INTRAVENOUS at 12:49

## 2025-08-22 ENCOUNTER — HOSPITAL ENCOUNTER (OUTPATIENT)
Dept: RADIATION ONCOLOGY | Age: 65
Discharge: HOME | End: 2025-08-22
Payer: MEDICARE

## 2025-08-22 VITALS
RESPIRATION RATE: 16 BRPM | TEMPERATURE: 97.2 F | OXYGEN SATURATION: 96 % | WEIGHT: 193.8 LBS | HEART RATE: 86 BPM | SYSTOLIC BLOOD PRESSURE: 117 MMHG | DIASTOLIC BLOOD PRESSURE: 76 MMHG | BODY MASS INDEX: 26.28 KG/M2

## 2025-08-22 DIAGNOSIS — C34.91 MALIGNANT NEOPLASM OF RIGHT LUNG, UNSPECIFIED PART OF LUNG (HCC): Primary | ICD-10-CM

## 2025-08-22 PROCEDURE — 99212 OFFICE O/P EST SF 10 MIN: CPT | Performed by: RADIOLOGY

## 2025-08-22 ASSESSMENT — PAIN DESCRIPTION - LOCATION: LOCATION: BACK

## 2025-08-22 ASSESSMENT — PAIN SCALES - GENERAL: PAINLEVEL_OUTOF10: 10

## 2025-09-01 ENCOUNTER — HOSPITAL ENCOUNTER (EMERGENCY)
Age: 65
Discharge: HOME | End: 2025-09-01
Attending: EMERGENCY MEDICINE
Payer: MEDICARE

## 2025-09-01 ENCOUNTER — APPOINTMENT (OUTPATIENT)
Dept: GENERAL RADIOLOGY | Age: 65
End: 2025-09-01
Payer: MEDICARE

## 2025-09-01 VITALS
HEART RATE: 79 BPM | DIASTOLIC BLOOD PRESSURE: 107 MMHG | HEIGHT: 72 IN | WEIGHT: 193 LBS | OXYGEN SATURATION: 99 % | BODY MASS INDEX: 26.14 KG/M2 | RESPIRATION RATE: 16 BRPM | TEMPERATURE: 97.7 F | SYSTOLIC BLOOD PRESSURE: 161 MMHG

## 2025-09-01 DIAGNOSIS — M25.512 ACUTE PAIN OF LEFT SHOULDER: Primary | ICD-10-CM

## 2025-09-01 PROCEDURE — 96372 THER/PROPH/DIAG INJ SC/IM: CPT | Performed by: EMERGENCY MEDICINE

## 2025-09-01 PROCEDURE — 99284 EMERGENCY DEPT VISIT MOD MDM: CPT | Performed by: EMERGENCY MEDICINE

## 2025-09-01 PROCEDURE — 73030 X-RAY EXAM OF SHOULDER: CPT

## 2025-09-01 PROCEDURE — 6360000002 HC RX W HCPCS

## 2025-09-01 RX ORDER — KETOROLAC TROMETHAMINE 15 MG/ML
15 INJECTION, SOLUTION INTRAMUSCULAR; INTRAVENOUS ONCE
Status: DISCONTINUED | OUTPATIENT
Start: 2025-09-01 | End: 2025-09-01

## 2025-09-01 RX ORDER — KETOROLAC TROMETHAMINE 15 MG/ML
15 INJECTION, SOLUTION INTRAMUSCULAR; INTRAVENOUS ONCE
Status: COMPLETED | OUTPATIENT
Start: 2025-09-01 | End: 2025-09-01

## 2025-09-01 RX ADMIN — KETOROLAC TROMETHAMINE 15 MG: 15 INJECTION, SOLUTION INTRAMUSCULAR; INTRAVENOUS at 15:45

## 2025-09-01 ASSESSMENT — PAIN DESCRIPTION - LOCATION: LOCATION: SHOULDER

## 2025-09-01 ASSESSMENT — PAIN SCALES - GENERAL: PAINLEVEL_OUTOF10: 10

## 2025-09-01 ASSESSMENT — PAIN DESCRIPTION - ORIENTATION: ORIENTATION: LEFT

## 2025-09-01 ASSESSMENT — PAIN - FUNCTIONAL ASSESSMENT: PAIN_FUNCTIONAL_ASSESSMENT: 0-10

## 2025-09-05 ENCOUNTER — HOSPITAL ENCOUNTER (OUTPATIENT)
Dept: NUCLEAR MEDICINE | Age: 65
End: 2025-09-05
Attending: RADIOLOGY
Payer: MEDICARE

## 2025-09-05 ENCOUNTER — HOSPITAL ENCOUNTER (OUTPATIENT)
Dept: NUCLEAR MEDICINE | Age: 65
Discharge: HOME | End: 2025-09-05
Attending: RADIOLOGY
Payer: MEDICARE

## 2025-09-05 DIAGNOSIS — C34.91 MALIGNANT NEOPLASM OF RIGHT LUNG, UNSPECIFIED PART OF LUNG (HCC): ICD-10-CM

## 2025-09-05 LAB — GLUCOSE BLD-MCNC: 109 MG/DL (ref 75–110)

## 2025-09-05 PROCEDURE — 2500000003 HC RX 250 WO HCPCS: Performed by: RADIOLOGY

## 2025-09-05 PROCEDURE — A9609 HC RX DIAGNOSTIC RADIOPHARMACEUTICAL: HCPCS | Performed by: RADIOLOGY

## 2025-09-05 PROCEDURE — 3430000000 HC RX DIAGNOSTIC RADIOPHARMACEUTICAL: Performed by: RADIOLOGY

## 2025-09-05 PROCEDURE — 82947 ASSAY GLUCOSE BLOOD QUANT: CPT

## 2025-09-05 PROCEDURE — 78815 PET IMAGE W/CT SKULL-THIGH: CPT

## 2025-09-05 RX ORDER — FLUDEOXYGLUCOSE F 18 200 MCI/ML
10 INJECTION, SOLUTION INTRAVENOUS
Status: COMPLETED | OUTPATIENT
Start: 2025-09-05 | End: 2025-09-05

## 2025-09-05 RX ORDER — SODIUM CHLORIDE 0.9 % (FLUSH) 0.9 %
10 SYRINGE (ML) INJECTION PRN
Status: ACTIVE | OUTPATIENT
Start: 2025-09-05

## 2025-09-05 RX ADMIN — SODIUM CHLORIDE, PRESERVATIVE FREE 10 ML: 5 INJECTION INTRAVENOUS at 15:39

## 2025-09-05 RX ADMIN — FLUDEOXYGLUCOSE F 18 11.36 MILLICURIE: 200 INJECTION, SOLUTION INTRAVENOUS at 15:39

## (undated) DEVICE — VISION PROBE ADAPTER AND SUCTION ADAPTER

## (undated) DEVICE — FORCEPS BX L100CM DIA1.8MM WRK CHN 2MM PULM S STL RAD JAW 4

## (undated) DEVICE — NEPTUNE E-SEP SMOKE EVACUATION PENCIL, COATED, 70MM BLADE, PUSH BUTTON SWITCH: Brand: NEPTUNE E-SEP

## (undated) DEVICE — STRAP ARMBRD W1.5XL32IN FOAM STR YET SFT W/ HK AND LOOP

## (undated) DEVICE — PROTECTOR ULN NRV PUR FOAM HK LOOP STRP ANATOMICALLY

## (undated) DEVICE — SNARE POLYP SM W13MMXL240CM SHTH DIA2.4MM OVL FLX DISP

## (undated) DEVICE — STAZ ENDO KIT: Brand: MEDLINE INDUSTRIES, INC.

## (undated) DEVICE — Device

## (undated) DEVICE — Device: Brand: ION

## (undated) DEVICE — STRAP,POSITIONING,KNEE/BODY,FOAM,4X60": Brand: MEDLINE

## (undated) DEVICE — TRAP SURG QUAD PARABOLA SLOT DSGN SFTY SCRN TRAPEASE

## (undated) DEVICE — SWIVEL CONNECTOR

## (undated) DEVICE — FORCEPS BX L240CM JAW DIA2.4MM ORNG L CAP W/ NDL DISP RAD

## (undated) DEVICE — GARMENT,MEDLINE,DVT,INT,CALF,MED, GEN2: Brand: MEDLINE

## (undated) DEVICE — BIOPSY NEEDLE, 21G: Brand: FLEXISION

## (undated) DEVICE — YANKAUER,FLEXIBLE HANDLE,REGLR CAPACITY: Brand: MEDLINE INDUSTRIES, INC.